# Patient Record
Sex: MALE | Race: WHITE | NOT HISPANIC OR LATINO | Employment: FULL TIME | URBAN - METROPOLITAN AREA
[De-identification: names, ages, dates, MRNs, and addresses within clinical notes are randomized per-mention and may not be internally consistent; named-entity substitution may affect disease eponyms.]

---

## 2017-01-12 ENCOUNTER — APPOINTMENT (OUTPATIENT)
Dept: GENERAL RADIOLOGY | Facility: CLINIC | Age: 37
End: 2017-01-12
Attending: FAMILY MEDICINE
Payer: COMMERCIAL

## 2017-01-12 ENCOUNTER — HOSPITAL ENCOUNTER (EMERGENCY)
Facility: CLINIC | Age: 37
Discharge: HOME | End: 2017-01-12
Attending: FAMILY MEDICINE | Admitting: FAMILY MEDICINE
Payer: COMMERCIAL

## 2017-01-12 VITALS — SYSTOLIC BLOOD PRESSURE: 133 MMHG | DIASTOLIC BLOOD PRESSURE: 83 MMHG | TEMPERATURE: 97.3 F | OXYGEN SATURATION: 97 %

## 2017-01-12 DIAGNOSIS — J45.901 ASTHMA EXACERBATION: ICD-10-CM

## 2017-01-12 DIAGNOSIS — J20.9 ACUTE BRONCHITIS, UNSPECIFIED ORGANISM: ICD-10-CM

## 2017-01-12 PROCEDURE — 93005 ELECTROCARDIOGRAM TRACING: CPT

## 2017-01-12 PROCEDURE — 40000275 ZZH STATISTIC RCP TIME EA 10 MIN

## 2017-01-12 PROCEDURE — 93010 ELECTROCARDIOGRAM REPORT: CPT | Performed by: FAMILY MEDICINE

## 2017-01-12 PROCEDURE — 99284 EMERGENCY DEPT VISIT MOD MDM: CPT | Mod: 25 | Performed by: FAMILY MEDICINE

## 2017-01-12 PROCEDURE — 71020 XR CHEST 2 VW: CPT

## 2017-01-12 PROCEDURE — 99284 EMERGENCY DEPT VISIT MOD MDM: CPT | Mod: 25

## 2017-01-12 PROCEDURE — 25000125 ZZHC RX 250: Performed by: FAMILY MEDICINE

## 2017-01-12 PROCEDURE — 94640 AIRWAY INHALATION TREATMENT: CPT

## 2017-01-12 RX ORDER — ALBUTEROL SULFATE 90 UG/1
2 AEROSOL, METERED RESPIRATORY (INHALATION) EVERY 6 HOURS PRN
Qty: 1 INHALER | Refills: 1 | Status: SHIPPED | OUTPATIENT
Start: 2017-01-12 | End: 2017-09-20

## 2017-01-12 RX ORDER — PREDNISONE 20 MG/1
20 TABLET ORAL 2 TIMES DAILY
Qty: 10 TABLET | Refills: 0 | Status: SHIPPED | OUTPATIENT
Start: 2017-01-12 | End: 2017-01-17

## 2017-01-12 RX ORDER — IPRATROPIUM BROMIDE AND ALBUTEROL SULFATE 2.5; .5 MG/3ML; MG/3ML
3 SOLUTION RESPIRATORY (INHALATION) ONCE
Status: COMPLETED | OUTPATIENT
Start: 2017-01-12 | End: 2017-01-12

## 2017-01-12 RX ADMIN — IPRATROPIUM BROMIDE AND ALBUTEROL SULFATE 3 ML: .5; 3 SOLUTION RESPIRATORY (INHALATION) at 17:09

## 2017-01-12 NOTE — ED NOTES
Patient is here today because of shortness of breath and some left sided chest pain.  Patient said threw his wife that this happened at about 11 am today at work.  Patient said that he has been coughing for 1 week.      Patient did refuse to have a  and wanted his wife to interpret for him.

## 2017-01-12 NOTE — ED PROVIDER NOTES
HPI  Patient is a 36-year-old male presenting with shortness of breath and dizziness.  He has a known history of asthma.  He ran out of his albuterol in October, 2016.  Patient has had prednisone as recently as April.  He was on azithromycin at that time as well.    The patient presents with coughing over the past 10 days.  No fever or production with his cough.  Follow work today, he was coughing in fits and began to have shortness of breath.  He became dizzy shortly after this began.  There was no fainting.  No vertigo described.  No nausea or vomiting.  He does report some wheezing.  He does have some left lateral chest pain with deep breathing and coughing, but only since this morning.    ROS: All other review of systems are negative other than that noted above.   PMH: Reviewed.  SH: Reviewed.  FH: Reviewed.      PHYSICAL  /83 mmHg  Temp(Src) 97.3  F (36.3  C) (Oral)  SpO2 97%  General: Patient is alert and in moderate distress.  Concerned, coughing.  Neurological: Alert.  Moving upper and lower extremities equally, bilaterally.  Head / Neck: Atraumatic.  Ears: Not done.  Eyes: Pupils are equal, round, and reactive.  Normal conjunctiva.  Nose: Midline.  No epistaxis.  Mouth / Throat: No ulcerations or lesions.  Upper pharynx is not erythematous.  Moist.  Respiratory: No respiratory distress. CTA B.  Cardiovascular: Regular rhythm.  Peripheral extremities are warm.  No edema.  No calf tenderness.  Abdomen / Pelvis: Not tender.  No distention.  Soft throughout.  Genitalia: Not done.  Musculoskeletal: No tenderness over major muscles and joints.  Skin: No evidence of rash or trauma.        PHYSICIAN  1645.  Pt presents with sob and dizziness.  Coughing over the past 10 days.  No fever or production.  At work today and began to feel sob.  He became dizzy shortly thereafter.  He continues to feel sob.  Hx of asthma, but no albuterol available since October.  L lateral chest pain with deep breathing and  coughing, since this morning.    EKG  (1612)   Rate: 58     Rhythm: sinus     Axis: nl  Intervals: CA (12-2) 172, QRS (<12) 97, QTc (>5) 409  P wave: nl     QRS complex: nl  ST segment / T-wave: nl  Conclusion: nl, mild sinus bradycardia.    IMAGING  CXR.  Images reviewed by me.  Radiology report was also reviewed.      2227.  Patient has a negative chest x-ray.  The albuterol/Atrovent neb helped with his breathing and cough.  Albuterol prescription will be given.  Prednisone prescription will be given.  Follow-up discussed      IMPRESSION    ICD-10-CM    1. Acute bronchitis, unspecified organism J20.9    2. Asthma exacerbation J45.901            Critical Care Time:  none   Trauma:      CMS Coding:  None        Zay Collins MD  01/12/17 2225

## 2017-01-12 NOTE — DISCHARGE INSTRUCTIONS
Return to the Emergency Room if the following occurs:     Worsened pain, fever, worsened breathing, or for any concern at anytime.    Or, follow-up with the following provider as we discussed:     Return to your primary doctor as needed, or if not improved over the next 7 days.    Medications discussed:    Albuterol.  Prednisone.    If you received pain-relieving or sedating medication during your time in the ER, avoid alcohol, driving automobiles, or working with machinery.  Also, a responsible adult must stay with you.      If you had X-rays or labs done we will attempt to contact you if there is a change needed in your care.      Call the Nurse Advice Line at [phone] or [phone] for any concern at anytime.

## 2017-01-12 NOTE — ED AVS SNAPSHOT
[facility] Emergency Department    [address] [address]    Phone:  [phone]    Fax:  [phone]                                       Santo Toure   MRN: [id number]    Department:  [facility] Emergency Department   Date of Visit:  1/12/2017           Patient Information     Date Of Birth          1980        Your diagnoses for this visit were:     Acute bronchitis, unspecified organism     Asthma exacerbation        You were seen by Zay Collins MD.        Discharge Instructions       Return to the Emergency Room if the following occurs:     Worsened pain, fever, worsened breathing, or for any concern at anytime.    Or, follow-up with the following provider as we discussed:     Return to your primary doctor as needed, or if not improved over the next 7 days.    Medications discussed:    Albuterol.  Prednisone.    If you received pain-relieving or sedating medication during your time in the ER, avoid alcohol, driving automobiles, or working with machinery.  Also, a responsible adult must stay with you.      If you had X-rays or labs done we will attempt to contact you if there is a change needed in your care.      Call the Nurse Advice Line at [phone] or [phone] for any concern at anytime.      24 Hour Appointment Hotline       To make an appointment at any [facility] clinic, call [phone] ([phone]). If you don't have a family doctor or clinic, we will help you find one. [facility] clinics are conveniently located to serve the needs of you and your family.             Review of your medicines      START taking        Dose / Directions Last dose taken    albuterol 108 (90 BASE) MCG/ACT Inhaler   Commonly known as:  PROAIR HFA/PROVENTIL HFA/VENTOLIN HFA   Dose:  2 puff   Quantity:  1 Inhaler        Inhale 2 puffs into the lungs every 6 hours as needed for shortness of breath / dyspnea or wheezing   Refills:  1        predniSONE 20 MG tablet    Commonly known as:  DELTASONE   Dose:  20 mg   Quantity:  10 tablet        Take 1 tablet (20 mg) by mouth 2 times daily for 5 days   Refills:  0          Our records show that you are taking the medicines listed below. If these are incorrect, please call your family doctor or clinic.        Dose / Directions Last dose taken    menthol 7 MG Lozg   Commonly known as:  COUGH DROP   Dose:  1 lozenge        Take 1 lozenge by mouth every hour as needed for cough   Refills:  0                Prescriptions were sent or printed at these locations (2 Prescriptions)                   [facility] [address] [address]    Telephone:  [phone]   Fax:  [phone]   Hours:                  E-Prescribed (2 of 2)         albuterol (PROAIR HFA/PROVENTIL HFA/VENTOLIN HFA) 108 (90 BASE) MCG/ACT Inhaler               predniSONE (DELTASONE) 20 MG tablet                Procedures and tests performed during your visit     EKG 12-lead, tracing only    XR Chest 2 Views      Orders Needing Specimen Collection     None      Pending Results     Date and Time Order Name Status Description    1/12/2017 1644 XR Chest 2 Views Preliminary             Pending Culture Results     No orders found from 1/11/2017 to 1/13/2017.       Test Results from your hospital stay           1/12/2017  5:08 PM - Interface, Radiant Ib      Narrative     CHEST TWO VIEWS 1/12/2017 5:05 PM     COMPARISON: Two view chest x-ray 8/16/2016    HISTORY: Shortness of breath    FINDINGS: The cardiac silhouette, pulmonary vasculature, lungs and  pleural spaces are within normal limits.        Impression     IMPRESSION: Clear lungs.                Thank you for choosing [facility]       Thank you for choosing [facility] for your care. Our goal is always to provide you with excellent care. Hearing back from our patients is one way we can continue to improve our services. Please take a few minutes to  "complete the written survey that you may receive in the mail after you visit with us. Thank you!        [facility] Information     [employer] lets you send messages to your doctor, view your test results, renew your prescriptions, schedule appointments and more. To sign up, go to [email or web address] . Click on \"Log in\" on the left side of the screen, which will take you to the Welcome page. Then click on \"Sign up Now\" on the right side of the page.     You will be asked to enter the access code listed below, as well as some personal information. Please follow the directions to create your username and password.     Your access code is: [email or web address]  Expires: 2017  5:50 PM     Your access code will  in 90 days. If you need help or a new code, please call your [facility] clinic or [phone].        Care EveryWhere ID     This is your Care EveryWhere ID. This could be used by other organizations to access your [facility] medical records  [id number]        After Visit Summary       This is your record. Keep this with you and show to your community pharmacist(s) and doctor(s) at your next visit.                  "

## 2017-01-12 NOTE — LETTER
[facility] EMERGENCY DEPARTMENT  [address] [address]  [phone]    2017    Santo Toure  [address] [address]  [phone] (home)     : 1980      To Whom it may concern:    Santo Toure was seen in our Emergency Department today, 2017.  I expect his condition to improve over the next 1-2 days.  He may return to work/school when improved.    Sincerely,        Zay Collins

## 2017-01-12 NOTE — ED NOTES
pt has had cough for wk and a half and went to work today for 40 min and had to leave due to dizziness

## 2017-01-12 NOTE — ED AVS SNAPSHOT
[facility] Emergency Department    [address] [address]    Phone:  [phone]    Fax:  [phone]                                       Santo Toure   MRN: [id number]    Department:  [facility] Emergency Department   Date of Visit:  1/12/2017           After Visit Summary Signature Page     I have received my discharge instructions, and my questions have been answered. I have discussed any challenges I see with this plan with the nurse or doctor.    ..........................................................................................................................................  Patient/Patient Representative Signature      ..........................................................................................................................................  Patient Representative Print Name and Relationship to Patient    ..................................................               ................................................  Date                                            Time    ..........................................................................................................................................  Reviewed by Signature/Title    ...................................................              ..............................................  Date                                                            Time

## 2017-02-10 DIAGNOSIS — Z53.9 ERRONEOUS ENCOUNTER--DISREGARD: ICD-10-CM

## 2017-02-10 DIAGNOSIS — Z31.41 ENCOUNTER FOR SEMEN ANALYSIS: ICD-10-CM

## 2017-02-28 ENCOUNTER — OFFICE VISIT (OUTPATIENT)
Dept: FAMILY MEDICINE | Facility: CLINIC | Age: 37
End: 2017-02-28
Payer: COMMERCIAL

## 2017-02-28 VITALS
DIASTOLIC BLOOD PRESSURE: 81 MMHG | TEMPERATURE: 98.6 F | OXYGEN SATURATION: 95 % | SYSTOLIC BLOOD PRESSURE: 138 MMHG | HEART RATE: 67 BPM | RESPIRATION RATE: 22 BRPM | WEIGHT: 231 LBS

## 2017-02-28 DIAGNOSIS — J20.9 ACUTE BRONCHITIS WITH SYMPTOMS > 10 DAYS: Primary | ICD-10-CM

## 2017-02-28 PROCEDURE — 99213 OFFICE O/P EST LOW 20 MIN: CPT | Performed by: NURSE PRACTITIONER

## 2017-02-28 RX ORDER — ALBUTEROL SULFATE 90 UG/1
2 AEROSOL, METERED RESPIRATORY (INHALATION) EVERY 6 HOURS PRN
Qty: 1 INHALER | Refills: 0 | Status: SHIPPED | OUTPATIENT
Start: 2017-02-28 | End: 2017-09-13

## 2017-02-28 RX ORDER — PREDNISONE 20 MG/1
TABLET ORAL
Qty: 10 TABLET | Refills: 0 | Status: SHIPPED | OUTPATIENT
Start: 2017-02-28 | End: 2017-09-13

## 2017-02-28 RX ORDER — AZITHROMYCIN 250 MG/1
TABLET, FILM COATED ORAL
Qty: 6 TABLET | Refills: 0 | Status: SHIPPED | OUTPATIENT
Start: 2017-02-28 | End: 2017-08-03

## 2017-02-28 NOTE — PROGRESS NOTES
SUBJECTIVE:                                                    Santo Toure is a 36 year old male who presents to clinic today for the following health issues:      Acute Illness   Acute illness concerns: cough  Onset: 2 weeks    Fever: no    Chills/Sweats: no    Headache (location?): YES- when couging    Sinus Pressure:YES    Conjunctivitis:  no    Ear Pain: YES- behind ears    Rhinorrhea: no    Congestion: no    Sore Throat: YES     Cough: YES    Wheeze: YES    Decreased Appetite: YES    Nausea: no    Vomiting: no    Diarrhea:  no    Dysuria/Freq.: no    Fatigue/Achiness: YES    Sick/Strep Exposure: no     Therapies Tried and outcome: tylenol pm, inhaler, neb treatments- not working. Asking about symbacort.      Problem list and histories reviewed & adjusted, as indicated.  Additional history: as documented    There is no problem list on file for this patient.    History reviewed. No pertinent past surgical history.    Social History   Substance Use Topics     Smoking status: Never Smoker     Smokeless tobacco: Not on file     Alcohol use Yes      Comment: on occasion     History reviewed. No pertinent family history.      Current Outpatient Prescriptions   Medication Sig Dispense Refill     predniSONE (DELTASONE) 20 MG tablet Take one tablet twice a day for 5 days. 10 tablet 0     azithromycin (ZITHROMAX Z-JENNIFER) 250 MG tablet Take 2 tablets on day 1 and then take 1 tablet days 2-5 6 tablet 0     albuterol (PROAIR HFA/PROVENTIL HFA/VENTOLIN HFA) 108 (90 BASE) MCG/ACT Inhaler Inhale 2 puffs into the lungs every 6 hours as needed for shortness of breath / dyspnea or wheezing 1 Inhaler 0     menthol (COUGH DROP) 7 MG LOZG Take 1 lozenge by mouth every hour as needed for cough       albuterol (PROAIR HFA/PROVENTIL HFA/VENTOLIN HFA) 108 (90 BASE) MCG/ACT Inhaler Inhale 2 puffs into the lungs every 6 hours as needed for shortness of breath / dyspnea or wheezing 1 Inhaler 1     Allergies   Allergen Reactions      Penicillins Other (See Comments) and Rash     Vomiting as a infant       Reviewed and updated as needed this visit by clinical staff  Tobacco  Med Hx  Surg Hx  Fam Hx  Soc Hx      Reviewed and updated as needed this visit by Provider         ROS:  Constitutional, HEENT, cardiovascular, pulmonary, gi and gu systems are negative, except as otherwise noted.    OBJECTIVE:                                                    BP (!) 167/93  Pulse 67  Temp 98.6  F (37  C) (Tympanic)  Resp 22  Wt 231 lb (104.8 kg)  SpO2 95%  There is no height or weight on file to calculate BMI.   GENERAL: healthy, alert and no distress  EYES: Eyes grossly normal to inspection, PERRL and conjunctivae and sclerae normal  HENT: ear canals and TM's normal, nose and mouth without ulcers or lesions  NECK: no adenopathy, no asymmetry, masses, or scars and thyroid normal to palpation  RESP: lungs clear to auscultation - no rales, rhonchi or wheezes  CV: regular rate and rhythm, normal S1 S2, no S3 or S4, no murmur, click or rub, no peripheral edema and peripheral pulses strong  MS: no gross musculoskeletal defects noted, no edema  SKIN: no suspicious lesions or rashes  NEURO: Normal strength and tone, mentation intact and speech normal  PSYCH: mentation appears normal, affect normal/bright         ASSESSMENT:                                                        ICD-10-CM    1. Acute bronchitis with symptoms > 10 days J20.9 predniSONE (DELTASONE) 20 MG tablet     azithromycin (ZITHROMAX Z-JENNIFER) 250 MG tablet     albuterol (PROAIR HFA/PROVENTIL HFA/VENTOLIN HFA) 108 (90 BASE) MCG/ACT Inhaler           PLAN:                                                      Patient Instructions   Use Medication as directed    Hydrate with fluids, rest, cool humidifier.  May use acetaminophen, ibuprofen prn.    For your Cough   The [employer]wheat Honey Dose: Given   hour Prior to Bedtime  For children age under 1 year -Do not use due to botulism risk     2-5 years -   tsp (2.5 mL)    6-11 years -1 tsp (5 mL)    12-18 years -2 tsp (10 mL)     Guaifenesin     Adult dose -Not to exceed 2.4 g (2400mg) per day    Child age 6-12 years -100 mg every 4 hr, not to exceed 1.2 g (1200mg) per day     Pediatric, 2-6 years -50 mg every 4 hr as needed, not to exceed 600 mg per day    Cough medications is not recommended in children under 2 years.  With use of cough medications have combination medications be aware of products in the cough medication you are using to avoid overdose    Follow up with PCP in 2 weeks for further work-up of your Asthma  Go to Emergency Room if sx worsen or change, Shortness of breath, chest pain, persistent fevers, or painful breathing occur.     Patient voiced understanding of instructions given.          Bronchitis, Antibiotic Treatment (Adult)    Bronchitis is an infection of the air passages (bronchial tubes) in your lungs. It often occurs when you have a cold. This illness is contagious during the first few days and is spread through the air by coughing and sneezing, or by direct contact (touching the sick person and then touching your own eyes, nose, or mouth).  Symptoms of bronchitis include cough with mucus (phlegm) and low-grade fever. Bronchitis usually lasts 7 to 14 days. Mild cases can be treated with simple home remedies. More severe infection is treated with an antibiotic.  Home care  Follow these guidelines when caring for yourself at home:    If your symptoms are severe, rest at home for the first 2 to 3 days. When you go back to your usual activities, don't let yourself get too tired.    Do not smoke. Also avoid being exposed to secondhand smoke.    You may use over-the-counter medicines to control fever or pain, unless another medicine was prescribed. (Note: If you have chronic liver or kidney disease or have ever had a stomach ulcer or gastrointestinal bleeding, talk with your healthcare provider before using these medicines. Also talk to your  provider if you are taking medicine to prevent blood clots.) Aspirin should never be given to anyone younger than 18 years of age who is ill with a viral infection or fever. It may cause severe liver or brain damage.    Your appetite may be poor, so a light diet is fine. Avoid dehydration by drinking 6 to 8 glasses of fluids per day (such as water, soft drinks, sports drinks, juices, tea, or soup). Extra fluids will help loosen secretions in the nose and lungs.    Over-the-counter cough, cold, and sore-throat medicines will not shorten the length of the illness, but they may be helpful to reduce symptoms. (Note: Do not use decongestants if you have high blood pressure.)    Finish all antibiotic medicine. Do this even if you are feeling better after only a few days.  Follow-up care  Follow up with your healthcare provider, or as advised. If you had an X-ray or ECG (electrocardiogram), a specialist will review it. You will be notified of any new findings that may affect your care.  Note: If you are age 65 or older, or if you have a chronic lung disease or condition that affects your immune system, or you smoke, talk to your healthcare provider about having pneumococcal vaccinations and a yearly influenza vaccination (flu shot).  When to seek medical advice  Call your healthcare provider right away if any of these occur:    Fever of 100.4 F (38 C) or higher    Coughing up increased amounts of colored sputum    Weakness, drowsiness, headache, facial pain, ear pain, or a stiff neck   Call 911, or get immediate medical care  Contact emergency services right away if any of these occur.    Coughing up blood    Worsening weakness, drowsiness, headache, or stiff neck    Trouble breathing, wheezing, or pain with breathing    [phone] [employer]. [address] [place] [address], [place] [address]. All rights reserved. This information is not intended as a substitute for professional medical care. Always follow your  healthcare professional's instructions.            ROSSY Wynne CNP  [facility]

## 2017-02-28 NOTE — MR AVS SNAPSHOT
After Visit Summary   2/28/2017    Santo Toure    MRN: [id number]           Patient Information     Date Of Birth          1980        Visit Information        Provider Department      2/28/2017 2:20 PM Ina May, APRN CNP; ASL [facility]        Today's Diagnoses     Acute bronchitis with symptoms > 10 days    -  1      Care Instructions    Use Medication as directed    Hydrate with fluids, rest, cool humidifier.  May use acetaminophen, ibuprofen prn.    For your Cough   The Buckwheat Honey Dose: Given   hour Prior to Bedtime  For children age under 1 year -Do not use due to botulism risk     2-5 years -  tsp (2.5 mL)    6-11 years -1 tsp (5 mL)    12-18 years -2 tsp (10 mL)     Guaifenesin     Adult dose -Not to exceed 2.4 g (2400mg) per day    Child age 6-12 years -100 mg every 4 hr, not to exceed 1.2 g (1200mg) per day     Pediatric, 2-6 years -50 mg every 4 hr as needed, not to exceed 600 mg per day    Cough medications is not recommended in children under 2 years.  With use of cough medications have combination medications be aware of products in the cough medication you are using to avoid overdose    Follow up with PCP in 2 weeks for further work-up of your Asthma  Go to Emergency Room if sx worsen or change, Shortness of breath, chest pain, persistent fevers, or painful breathing occur.     Patient voiced understanding of instructions given.          Bronchitis, Antibiotic Treatment (Adult)    Bronchitis is an infection of the air passages (bronchial tubes) in your lungs. It often occurs when you have a cold. This illness is contagious during the first few days and is spread through the air by coughing and sneezing, or by direct contact (touching the sick person and then touching your own eyes, nose, or mouth).  Symptoms of bronchitis include cough with mucus (phlegm) and low-grade fever. Bronchitis usually lasts 7 to 14 days. Mild cases can be treated  with simple home remedies. More severe infection is treated with an antibiotic.  Home care  Follow these guidelines when caring for yourself at home:    If your symptoms are severe, rest at home for the first 2 to 3 days. When you go back to your usual activities, don't let yourself get too tired.    Do not smoke. Also avoid being exposed to secondhand smoke.    You may use over-the-counter medicines to control fever or pain, unless another medicine was prescribed. (Note: If you have chronic liver or kidney disease or have ever had a stomach ulcer or gastrointestinal bleeding, talk with your healthcare provider before using these medicines. Also talk to your provider if you are taking medicine to prevent blood clots.) Aspirin should never be given to anyone younger than 18 years of age who is ill with a viral infection or fever. It may cause severe liver or brain damage.    Your appetite may be poor, so a light diet is fine. Avoid dehydration by drinking 6 to 8 glasses of fluids per day (such as water, soft drinks, sports drinks, juices, tea, or soup). Extra fluids will help loosen secretions in the nose and lungs.    Over-the-counter cough, cold, and sore-throat medicines will not shorten the length of the illness, but they may be helpful to reduce symptoms. (Note: Do not use decongestants if you have high blood pressure.)    Finish all antibiotic medicine. Do this even if you are feeling better after only a few days.  Follow-up care  Follow up with your healthcare provider, or as advised. If you had an X-ray or ECG (electrocardiogram), a specialist will review it. You will be notified of any new findings that may affect your care.  Note: If you are age 65 or older, or if you have a chronic lung disease or condition that affects your immune system, or you smoke, talk to your healthcare provider about having pneumococcal vaccinations and a yearly influenza vaccination (flu shot).  When to seek medical advice  Call  "your healthcare provider right away if any of these occur:    Fever of 100.4 F (38 C) or higher    Coughing up increased amounts of colored sputum    Weakness, drowsiness, headache, facial pain, ear pain, or a stiff neck   Call 911, or get immediate medical care  Contact emergency services right away if any of these occur.    Coughing up blood    Worsening weakness, drowsiness, headache, or stiff neck    Trouble breathing, wheezing, or pain with breathing    7499-8468 [employer]. [address] [place] [address]. All rights reserved. This information is not intended as a substitute for professional medical care. Always follow your healthcare professional's instructions.              Follow-ups after your visit        Who to contact     If you have questions or need follow up information about today's clinic visit or your schedule please contact [facility] directly at [phone].  Normal or non-critical lab and imaging results will be communicated to you by Insightpoolhart, letter or phone within 4 business days after the clinic has received the results. If you do not hear from us within 7 days, please contact the clinic through Insightpoolhart or phone. If you have a critical or abnormal lab result, we will notify you by phone as soon as possible.  Submit refill requests through L[employer]ENE or call your pharmacy and they will forward the refill request to us. Please allow 3 business days for your refill to be completed.          Additional Information About Your Visit        Insightpoolhart Information     LYSOGENE lets you send messages to your doctor, view your test results, renew your prescriptions, schedule appointments and more. To sign up, go to [email or web address] . Click on \"Log in\" on the left side of the screen, which will take you to the Welcome page. Then click on \"Sign up Now\" on the right side of the page.     You will be asked to enter the access code listed below, as well as some " personal information. Please follow the directions to create your username and password.     Your access code is: [email or web address]  Expires: 2017  5:50 PM     Your access code will  in 90 days. If you need help or a new code, please call your [facility] clinic or [phone].        Care EveryWhere ID     This is your Care EveryWhere ID. This could be used by other organizations to access your [facility] medical records  [id number]        Your Vitals Were     Pulse Temperature Respirations Pulse Oximetry          67 98.6  F (37  C) (Tympanic) 22 95%         Blood Pressure from Last 3 Encounters:   17 (!) 167/93   17 133/83   16 128/83    Weight from Last 3 Encounters:   17 231 lb (104.8 kg)   16 235 lb (106.6 kg)   16 242 lb 9.6 oz (110 kg)              Today, you had the following     No orders found for display         Today's Medication Changes          These changes are accurate as of: 17  2:55 PM.  If you have any questions, ask your nurse or doctor.               Start taking these medicines.        Dose/Directions    azithromycin 250 MG tablet   Commonly known as:  ZITHROMAX Z-JENNIFER   Used for:  Acute bronchitis with symptoms > 10 days   Started by:  Ina May APRN CNP        Take 2 tablets on day 1 and then take 1 tablet days 2-5   Quantity:  6 tablet   Refills:  0       predniSONE 20 MG tablet   Commonly known as:  DELTASONE   Used for:  Acute bronchitis with symptoms > 10 days   Started by:  Ina May APRN CNP        Take one tablet twice a day for 5 days.   Quantity:  10 tablet   Refills:  0            Where to get your medicines      These medications were sent to [facility] [address] [place] [id number]     Phone:  [phone]     azithromycin 250 MG tablet    predniSONE 20 MG tablet                Primary Care Provider    None Specified       No primary provider on file.         Thank you!     Thank you for choosing [facility]  for your care. Our goal is always to provide you with excellent care. Hearing back from our patients is one way we can continue to improve our services. Please take a few minutes to complete the written survey that you may receive in the mail after your visit with us. Thank you!             Your Updated Medication List - Protect others around you: Learn how to safely use, store and throw away your medicines at www.disposemymeds.org.          This list is accurate as of: 2/28/17  2:55 PM.  Always use your most recent med list.                   Brand Name Dispense Instructions for use    albuterol 108 (90 BASE) MCG/ACT Inhaler    PROAIR HFA/PROVENTIL HFA/VENTOLIN HFA    1 Inhaler    Inhale 2 puffs into the lungs every 6 hours as needed for shortness of breath / dyspnea or wheezing       azithromycin 250 MG tablet    ZITHROMAX Z-JENNIFER    6 tablet    Take 2 tablets on day 1 and then take 1 tablet days 2-5       menthol 7 MG Lozg    COUGH DROP     Take 1 lozenge by mouth every hour as needed for cough       predniSONE 20 MG tablet    DELTASONE    10 tablet    Take one tablet twice a day for 5 days.

## 2017-02-28 NOTE — PATIENT INSTRUCTIONS
Use Medication as directed    Hydrate with fluids, rest, cool humidifier.  May use acetaminophen, ibuprofen prn.    For your Cough   The Buckwheat Honey Dose: Given   hour Prior to Bedtime  For children age under 1 year -Do not use due to botulism risk     2-5 years -  tsp (2.5 mL)    6-11 years -1 tsp (5 mL)    12-18 years -2 tsp (10 mL)     Guaifenesin     Adult dose -Not to exceed 2.4 g (2400mg) per day    Child age 6-12 years -100 mg every 4 hr, not to exceed 1.2 g (1200mg) per day     Pediatric, 2-6 years -50 mg every 4 hr as needed, not to exceed 600 mg per day    Cough medications is not recommended in children under 2 years.  With use of cough medications have combination medications be aware of products in the cough medication you are using to avoid overdose    Follow up with PCP in 2 weeks for further work-up of your Asthma  Go to Emergency Room if sx worsen or change, Shortness of breath, chest pain, persistent fevers, or painful breathing occur.     Patient voiced understanding of instructions given.          Bronchitis, Antibiotic Treatment (Adult)    Bronchitis is an infection of the air passages (bronchial tubes) in your lungs. It often occurs when you have a cold. This illness is contagious during the first few days and is spread through the air by coughing and sneezing, or by direct contact (touching the sick person and then touching your own eyes, nose, or mouth).  Symptoms of bronchitis include cough with mucus (phlegm) and low-grade fever. Bronchitis usually lasts 7 to 14 days. Mild cases can be treated with simple home remedies. More severe infection is treated with an antibiotic.  Home care  Follow these guidelines when caring for yourself at home:    If your symptoms are severe, rest at home for the first 2 to 3 days. When you go back to your usual activities, don't let yourself get too tired.    Do not smoke. Also avoid being exposed to secondhand smoke.    You may use over-the-counter  medicines to control fever or pain, unless another medicine was prescribed. (Note: If you have chronic liver or kidney disease or have ever had a stomach ulcer or gastrointestinal bleeding, talk with your healthcare provider before using these medicines. Also talk to your provider if you are taking medicine to prevent blood clots.) Aspirin should never be given to anyone younger than 18 years of age who is ill with a viral infection or fever. It may cause severe liver or brain damage.    Your appetite may be poor, so a light diet is fine. Avoid dehydration by drinking 6 to 8 glasses of fluids per day (such as water, soft drinks, sports drinks, juices, tea, or soup). Extra fluids will help loosen secretions in the nose and lungs.    Over-the-counter cough, cold, and sore-throat medicines will not shorten the length of the illness, but they may be helpful to reduce symptoms. (Note: Do not use decongestants if you have high blood pressure.)    Finish all antibiotic medicine. Do this even if you are feeling better after only a few days.  Follow-up care  Follow up with your healthcare provider, or as advised. If you had an X-ray or ECG (electrocardiogram), a specialist will review it. You will be notified of any new findings that may affect your care.  Note: If you are age 65 or older, or if you have a chronic lung disease or condition that affects your immune system, or you smoke, talk to your healthcare provider about having pneumococcal vaccinations and a yearly influenza vaccination (flu shot).  When to seek medical advice  Call your healthcare provider right away if any of these occur:    Fever of 100.4 F (38 C) or higher    Coughing up increased amounts of colored sputum    Weakness, drowsiness, headache, facial pain, ear pain, or a stiff neck   Call 911, or get immediate medical care  Contact emergency services right away if any of these occur.    Coughing up blood    Worsening weakness, drowsiness, headache, or  stiff neck    Trouble breathing, wheezing, or pain with breathing    3808-5003 [employer]. [address] [place] [address], [place] [address]. All rights reserved. This information is not intended as a substitute for professional medical care. Always follow your healthcare professional's instructions.

## 2017-07-28 VITALS
WEIGHT: 230 LBS | SYSTOLIC BLOOD PRESSURE: 153 MMHG | TEMPERATURE: 97.5 F | OXYGEN SATURATION: 98 % | DIASTOLIC BLOOD PRESSURE: 102 MMHG | RESPIRATION RATE: 18 BRPM

## 2017-07-28 PROCEDURE — 99283 EMERGENCY DEPT VISIT LOW MDM: CPT | Performed by: STUDENT IN AN ORGANIZED HEALTH CARE EDUCATION/TRAINING PROGRAM

## 2017-07-28 PROCEDURE — 99284 EMERGENCY DEPT VISIT MOD MDM: CPT | Mod: Z6 | Performed by: STUDENT IN AN ORGANIZED HEALTH CARE EDUCATION/TRAINING PROGRAM

## 2017-07-29 ENCOUNTER — HOSPITAL ENCOUNTER (EMERGENCY)
Facility: CLINIC | Age: 37
Discharge: HOME | End: 2017-07-29
Attending: STUDENT IN AN ORGANIZED HEALTH CARE EDUCATION/TRAINING PROGRAM | Admitting: STUDENT IN AN ORGANIZED HEALTH CARE EDUCATION/TRAINING PROGRAM
Payer: OTHER MISCELLANEOUS

## 2017-07-29 ENCOUNTER — APPOINTMENT (OUTPATIENT)
Dept: GENERAL RADIOLOGY | Facility: CLINIC | Age: 37
End: 2017-07-29
Attending: STUDENT IN AN ORGANIZED HEALTH CARE EDUCATION/TRAINING PROGRAM
Payer: OTHER MISCELLANEOUS
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
## 2017-07-29 DIAGNOSIS — S60.222A CONTUSION OF LEFT HAND, INITIAL ENCOUNTER: ICD-10-CM

## 2017-07-29 DIAGNOSIS — S60.112A SUBUNGUAL HEMATOMA OF LEFT THUMB, INITIAL ENCOUNTER: ICD-10-CM

## 2017-07-29 PROCEDURE — 25000132 ZZH RX MED GY IP 250 OP 250 PS 637: Performed by: STUDENT IN AN ORGANIZED HEALTH CARE EDUCATION/TRAINING PROGRAM

## 2017-07-29 PROCEDURE — 73130 X-RAY EXAM OF HAND: CPT | Mod: LT

## 2017-07-29 RX ORDER — OXYCODONE AND ACETAMINOPHEN 5; 325 MG/1; MG/1
1 TABLET ORAL ONCE
Status: COMPLETED | OUTPATIENT
Start: 2017-07-29 | End: 2017-07-29

## 2017-07-29 RX ORDER — OXYCODONE AND ACETAMINOPHEN 5; 325 MG/1; MG/1
1-2 TABLET ORAL EVERY 4 HOURS PRN
Qty: 10 TABLET | Refills: 0 | Status: SHIPPED | OUTPATIENT
Start: 2017-07-29 | End: 2017-09-13

## 2017-07-29 RX ADMIN — OXYCODONE HYDROCHLORIDE AND ACETAMINOPHEN 1 TABLET: 5; 325 TABLET ORAL at 02:57

## 2017-07-29 RX ADMIN — IBUPROFEN 600 MG: 400 TABLET ORAL at 02:57

## 2017-07-29 NOTE — ED AVS SNAPSHOT
[facility] Emergency Department    [address] [facility] [address]    Phone:  [phone]    Fax:  [phone]                                       Santo Toure   MRN: [id number]    Department:  [facility] Emergency Department   Date of Visit:  7/28/2017           After Visit Summary Signature Page     I have received my discharge instructions, and my questions have been answered. I have discussed any challenges I see with this plan with the nurse or doctor.    ..........................................................................................................................................  Patient/Patient Representative Signature      ..........................................................................................................................................  Patient Representative Print Name and Relationship to Patient    ..................................................               ................................................  Date                                            Time    ..........................................................................................................................................  Reviewed by Signature/Title    ...................................................              ..............................................  Date                                                            Time

## 2017-07-29 NOTE — ED AVS SNAPSHOT
[facility] Emergency Department    [address] [facility] [address]    Phone:  [phone]    Fax:  [phone]                                       Santo Toure   MRN: [id number]    Department:  [facility] Emergency Department   Date of Visit:  7/28/2017           Patient Information     Date Of Birth          1980        Your diagnoses for this visit were:     Contusion of left hand, initial encounter     Subungual hematoma of left thumb, initial encounter        You were seen by Asif Hicks DO.      Follow-up Information     Follow up with [facility].    Specialty:  EMERGENCY MEDICINE    Why:  Return if symptoms change/progress.    Contact information:    [address] [place] [address] [phone]    Additional information:    The medical center is located at   [address] [place] [address]. (between [facility] and   [facility] in [place], four miles north   of [place]).        Discharge Instructions         Hand Contusion  You have a contusion. This is also called a bruise. There is swelling and some bleeding under the skin, but no broken bones. This injury generally takes a few days to a few weeks to heal.  During that time, the bruise will typically change in color from reddish, to purple-blue, to greenish-yellow, then to yellow-brown.  Home care    Elevate the hand to reduce pain and swelling. As much as possible, sit or lie down with the hand raised about the level of your heart. This is especially important during the first 48 hours.    Ice the hand to help reduce pain and swelling. Wrap a cold source (ice pack or ice cubes in a plastic bag) in a thin towel. Apply to the bruised area for 20 minutes every 1 to 2 hours the first day. Continue this 3 to 4 times a day until the pain and swelling goes away.    Unless another medicine was prescribed, you can take acetaminophen, ibuprofen, or naproxen to control pain. (If you have chronic liver  or kidney disease or ever had a stomach ulcer or gastrointestinal bleeding, talk with your doctor before using these medicines.)  Follow up  Follow up with your healthcare provider or our staff as advised. Call if you are not improving within 1 to 2 weeks.  When to seek medical advice   Call your healthcare provider right away if you have any of the following:    Increased pain or swelling    Arm becomes cold, blue, numb or tingly    Signs of infection: Warmth, drainage, or increased redness or pain around the bruise    Inability to move the injured hand     Frequent bruising for unknown reasons  Date Last Reviewed: 2/1/2017 2000-2017 [facility]. [address] [place], [place] [id number]. All rights reserved. This information is not intended as a substitute for professional medical care. Always follow your healthcare professional's instructions.          Discharge References/Attachments     CONCUSSION, NO WAKE-UP (ENGLISH)      24 Hour Appointment Hotline       To make an appointment at any [facility], call [phone] ([phone]). If you don't have a family doctor or clinic, we will help you find one. [facility] clinics are conveniently located to serve the needs of you and your family.             Review of your medicines      START taking        Dose / Directions Last dose taken    oxyCODONE-acetaminophen 5-325 MG per tablet   Commonly known as:  PERCOCET   Dose:  1-2 tablet   Quantity:  10 tablet        Take 1-2 tablets by mouth every 4 hours as needed for pain maximum 8 tablet(s) per day   Refills:  0          Our records show that you are taking the medicines listed below. If these are incorrect, please call your family doctor or clinic.        Dose / Directions Last dose taken    * albuterol 108 (90 BASE) MCG/ACT Inhaler   Commonly known as:  PROAIR HFA/PROVENTIL HFA/VENTOLIN HFA   Dose:  2 puff   Quantity:  1 Inhaler        Inhale 2 puffs into the lungs every 6 hours as needed  for shortness of breath / dyspnea or wheezing   Refills:  1        * albuterol 108 (90 BASE) MCG/ACT Inhaler   Commonly known as:  PROAIR HFA/PROVENTIL HFA/VENTOLIN HFA   Dose:  2 puff   Quantity:  1 Inhaler        Inhale 2 puffs into the lungs every 6 hours as needed for shortness of breath / dyspnea or wheezing   Refills:  0        azithromycin 250 MG tablet   Commonly known as:  ZITHROMAX Z-JENNIFER   Quantity:  6 tablet        Take 2 tablets on day 1 and then take 1 tablet days 2-5   Refills:  0        menthol 7 MG Lozg   Commonly known as:  COUGH DROP   Dose:  1 lozenge        Take 1 lozenge by mouth every hour as needed for cough   Refills:  0        predniSONE 20 MG tablet   Commonly known as:  DELTASONE   Quantity:  10 tablet        Take one tablet twice a day for 5 days.   Refills:  0        * Notice:  This list has 2 medication(s) that are the same as other medications prescribed for you. Read the directions carefully, and ask your doctor or other care provider to review them with you.            Prescriptions were sent or printed at these locations (1 Prescription)                   Other Prescriptions                Printed at Department/Unit printer (1 of 1)         oxyCODONE-acetaminophen (PERCOCET) 5-325 MG per tablet                Procedures and tests performed during your visit     Hand XR, G/E 3 views, left      Orders Needing Specimen Collection     None      Pending Results     No orders found from 7/27/2017 to 7/30/2017.            Pending Culture Results     No orders found from 7/27/2017 to 7/30/2017.            Pending Results Instructions     If you had any lab results that were not finalized at the time of your Discharge, you can call the ED Lab Result RN at [phone]. You will be contacted by this team for any positive Lab results or changes in treatment. The nurses are available 7 days a week from 10A to 6:30P.  You can leave a message 24 hours per day and they will return your call.         Test Results From Your Hospital Stay        7/29/2017  2:35 AM      Narrative     LEFT HAND 3 VIEWS  7/29/2017 2:21 AM     HISTORY: Pain after injury.    COMPARISON: None.        Impression     IMPRESSION: No visualized acute fracture or malalignment of the left  hand.    LISY HENDRIX MD                Thank you for choosing [facility]       Thank you for choosing [facility] for your care. Our goal is always to provide you with excellent care. Hearing back from our patients is one way we can continue to improve our services. Please take a few minutes to complete the written survey that you may receive in the mail after you visit with us. Thank you!        [employer] Information     [facility] gives you secure access to your electronic health record. If you see a primary care provider, you can also send messages to your care team and make appointments. If you have questions, please call your primary care clinic.  If you do not have a primary care provider, please call [phone] and they will assist you.        Care EveryWhere ID     This is your Care EveryWhere ID. This could be used by other organizations to access your [facility] medical records  [id number]        Equal Access to Services     ANTONINA ALBA : Hadii teo Victoria, waaxda luqadaha, qaybta kaalmajasper reza, mookie briggs . So [facility] [phone].    ATENCIÓN: Si habla español, tiene a marshall disposición servicios gratuitos de asistencia lingüística. Llame al [phone].    We comply with applicable federal civil rights laws and [place] laws. We do not discriminate on the basis of race, color, national origin, age, disability sex, sexual orientation or gender identity.            After Visit Summary       This is your record. Keep this with you and show to your community pharmacist(s) and doctor(s) at your next visit.

## 2017-07-29 NOTE — ED PROVIDER NOTES
History     Chief Complaint   Patient presents with     Hand Pain     Deaf pt that presents to ER with wife who is .  pt declined  services and wife will interpret for staff.  pt was moving drum at 1930 with crane and drum hit table that thumb was holding and entire left hand hurts.      HPI  Santo Toure is a 37 year old male with medical history including deafness who presents to the department for complaint of left hand pain after injury sustained at work. He shouldn't is refused interpretive services and prefers that his wife interprets for him. He explains that around 7 PM while at work his hand was compressed between a 2000 pound drum and table. He did not feel pain immediately but beginning around 9 PM he began suffering from increasing hand and finger discomfort. The pain is constant and most pronounced along the distal left thumb near subungual hematoma. He denies sensory deficit, weakness, or cyanosis. No other injuries.    I have reviewed the Medications, Allergies, Past Medical and Surgical History, and Social History in the Epic system.    There is no problem list on file for this patient.      No past surgical history on file.    Social History     Social History     Marital status: Single     Spouse name: N/A     Number of children: N/A     Years of education: N/A     Occupational History     Not on file.     Social History Main Topics     Smoking status: Never Smoker     Smokeless tobacco: Not on file     Alcohol use Yes      Comment: on occasion     Drug use: Not on file     Sexual activity: Not on file     Other Topics Concern     Not on file     Social History Narrative       No family history on file.      There is no immunization history on file for this patient.      Review of Systems  Constitutional: Negative for fever or chills.  Musculoskeletal: Positive for left hand pain and left thumb pain.  Neurological: Negative for sensory deficits or weakness.  Skin:  Positive for left thumb subungual hematoma. Negative for laceration.    All others reviewed and are negative.      Physical Exam   BP: (!) 153/102  Heart Rate: 67  Temp: 97.5  F (36.4  C)  Resp: 18  Weight: 104.3 kg (230 lb)  SpO2: 98 %  Physical Exam  Constitutional: Well developed, well nourished. Appears nontoxic and in no acute distress.   Head: Normocephalic and atraumatic. Symmetric in appearance.  Eyes: Conjunctivae are normal.  Neck: Neck supple.  Cardiovascular: No cyanosis.   Respiratory: Effort normal, no respiratory distress.   Musculoskeletal: Left thumb subungual hematoma. No obvious deformity or wound/laceration. Left distal thumb and thenar tenderness to palpation, no other finger or hand tenderness and no snuffbox tenderness. Patient is able to abduct fingers against resistance, oppose thumb to index finger, and extend as expected. Sensation intact of all digits along median, radial, and ulnar nerve distributions. FDP, FDS, and Extensor tendons intact. No cyanosis and capillary refill less than 2 seconds in each digit. 2/4 palpable radial and ulnar pulses.  Neuro: Patient is alert.  Skin: Skin is warm and dry, not diaphoretic.      ED Course     ED Course     Procedures             Critical Care time:  none               Labs Ordered and Resulted from Time of ED Arrival Up to the Time of Departure from the ED - No data to display    Assessments & Plan (with Medical Decision Making)   Santo Toure is a 37 year old male who presents to the emergency department for complaint of left hand pain after compression injury sustained while at work. Based on his symptoms and the clinical examination, there is no evidence to suggest deformity, skin or nail injury, tendon rupture, or neurovascular deficits. Radiographic imaging is unable to identify a fracture or other acute bony abnormality. Subungual hematoma was drained via cautery and patient tolerated well.    Clinical impression is that his symptoms are  likely caused by soft tissue injury from compression, we discussed the unlikely possibility of compartment syndrome of hand and symptoms to watch for. He may use over-the-counter ibuprofen as directed or short course of Percocet as needed for breakthrough pain. They were also instructed to return to an emergency department if they develop increasing pain or swelling, neurologic deficits, or other concerning symptoms.      Disclaimer: This note consists of symbols derived from keyboarding, dictation, and/or voice recognition software. As a result, there may be errors in the script that have gone undetected.  Please consider this when interpreting information found in the chart.         I have reviewed the nursing notes.    I have reviewed the findings, diagnosis, plan and need for follow up with the patient.       Discharge Medication List as of 7/29/2017  3:57 AM      START taking these medications    Details   oxyCODONE-acetaminophen (PERCOCET) 5-325 MG per tablet Take 1-2 tablets by mouth every 4 hours as needed for pain maximum 8 tablet(s) per day, Disp-10 tablet, R-0, Local Print             Final diagnoses:   Contusion of left hand, initial encounter   Subungual hematoma of left thumb, initial encounter       7/28/2017   [facility] EMERGENCY DEPARTMENT     Asif Hicks,   07/29/17 0439

## 2017-07-29 NOTE — DISCHARGE INSTRUCTIONS
Hand Contusion  You have a contusion. This is also called a bruise. There is swelling and some bleeding under the skin, but no broken bones. This injury generally takes a few days to a few weeks to heal.  During that time, the bruise will typically change in color from reddish, to purple-blue, to greenish-yellow, then to yellow-brown.  Home care    Elevate the hand to reduce pain and swelling. As much as possible, sit or lie down with the hand raised about the level of your heart. This is especially important during the first 48 hours.    Ice the hand to help reduce pain and swelling. Wrap a cold source (ice pack or ice cubes in a plastic bag) in a thin towel. Apply to the bruised area for 20 minutes every 1 to 2 hours the first day. Continue this 3 to 4 times a day until the pain and swelling goes away.    Unless another medicine was prescribed, you can take acetaminophen, ibuprofen, or naproxen to control pain. (If you have chronic liver or kidney disease or ever had a stomach ulcer or gastrointestinal bleeding, talk with your doctor before using these medicines.)  Follow up  Follow up with your healthcare provider or our staff as advised. Call if you are not improving within 1 to 2 weeks.  When to seek medical advice   Call your healthcare provider right away if you have any of the following:    Increased pain or swelling    Arm becomes cold, blue, numb or tingly    Signs of infection: Warmth, drainage, or increased redness or pain around the bruise    Inability to move the injured hand     Frequent bruising for unknown reasons  Date Last Reviewed: 2/1/2017 2000-2017 [employer]. [address] [place] [address], [place] [address]. All rights reserved. This information is not intended as a substitute for professional medical care. Always follow your healthcare professional's instructions.

## 2017-08-03 ENCOUNTER — ANESTHESIA (OUTPATIENT)
Dept: EMERGENCY MEDICINE | Facility: CLINIC | Age: 37
End: 2017-08-03
Payer: OTHER MISCELLANEOUS

## 2017-08-03 ENCOUNTER — HOSPITAL ENCOUNTER (EMERGENCY)
Facility: CLINIC | Age: 37
Discharge: HOME | End: 2017-08-03
Attending: STUDENT IN AN ORGANIZED HEALTH CARE EDUCATION/TRAINING PROGRAM | Admitting: STUDENT IN AN ORGANIZED HEALTH CARE EDUCATION/TRAINING PROGRAM
Payer: OTHER MISCELLANEOUS

## 2017-08-03 ENCOUNTER — APPOINTMENT (OUTPATIENT)
Dept: CT IMAGING | Facility: CLINIC | Age: 37
End: 2017-08-03
Attending: STUDENT IN AN ORGANIZED HEALTH CARE EDUCATION/TRAINING PROGRAM
Payer: OTHER MISCELLANEOUS

## 2017-08-03 ENCOUNTER — APPOINTMENT (OUTPATIENT)
Dept: GENERAL RADIOLOGY | Facility: CLINIC | Age: 37
End: 2017-08-03
Attending: STUDENT IN AN ORGANIZED HEALTH CARE EDUCATION/TRAINING PROGRAM
Payer: OTHER MISCELLANEOUS

## 2017-08-03 ENCOUNTER — ANESTHESIA EVENT (OUTPATIENT)
Dept: EMERGENCY MEDICINE | Facility: CLINIC | Age: 37
End: 2017-08-03
Payer: OTHER MISCELLANEOUS

## 2017-08-03 VITALS
WEIGHT: 230 LBS | TEMPERATURE: 98 F | DIASTOLIC BLOOD PRESSURE: 74 MMHG | SYSTOLIC BLOOD PRESSURE: 127 MMHG | BODY MASS INDEX: 31.15 KG/M2 | HEIGHT: 72 IN | HEART RATE: 93 BPM | OXYGEN SATURATION: 92 % | RESPIRATION RATE: 16 BRPM

## 2017-08-03 DIAGNOSIS — R31.9 URINARY TRACT INFECTION WITH HEMATURIA, SITE UNSPECIFIED: ICD-10-CM

## 2017-08-03 DIAGNOSIS — R11.2 NON-INTRACTABLE VOMITING WITH NAUSEA, UNSPECIFIED VOMITING TYPE: ICD-10-CM

## 2017-08-03 DIAGNOSIS — N39.0 URINARY TRACT INFECTION WITH HEMATURIA, SITE UNSPECIFIED: ICD-10-CM

## 2017-08-03 DIAGNOSIS — R51.9 HEADACHE, UNSPECIFIED HEADACHE TYPE: ICD-10-CM

## 2017-08-03 DIAGNOSIS — K59.03 DRUG-INDUCED CONSTIPATION: ICD-10-CM

## 2017-08-03 DIAGNOSIS — S60.012A CONTUSION OF LEFT THUMB WITHOUT DAMAGE TO NAIL, INITIAL ENCOUNTER: ICD-10-CM

## 2017-08-03 LAB
ALBUMIN SERPL-MCNC: 3.3 G/DL (ref 3.4–5)
ALBUMIN UR-MCNC: 30 MG/DL
ALP SERPL-CCNC: 101 U/L (ref 40–150)
ALT SERPL W P-5'-P-CCNC: 32 U/L (ref 0–70)
ANION GAP SERPL CALCULATED.3IONS-SCNC: 7 MMOL/L (ref 3–14)
APPEARANCE CSF: CLEAR
APPEARANCE UR: CLEAR
AST SERPL W P-5'-P-CCNC: 28 U/L (ref 0–45)
BASOPHILS # BLD AUTO: 0 10E9/L (ref 0–0.2)
BASOPHILS NFR BLD AUTO: 0 %
BILIRUB SERPL-MCNC: 1 MG/DL (ref 0.2–1.3)
BILIRUB UR QL STRIP: NEGATIVE
BUN SERPL-MCNC: 8 MG/DL (ref 7–30)
CALCIUM SERPL-MCNC: 9.4 MG/DL (ref 8.5–10.1)
CHLORIDE SERPL-SCNC: 103 MMOL/L (ref 94–109)
CO2 SERPL-SCNC: 27 MMOL/L (ref 20–32)
COLOR CSF: COLORLESS
COLOR UR AUTO: YELLOW
CREAT SERPL-MCNC: 0.7 MG/DL (ref 0.66–1.25)
DIFFERENTIAL METHOD BLD: ABNORMAL
EOSINOPHIL # BLD AUTO: 0 10E9/L (ref 0–0.7)
EOSINOPHIL NFR BLD AUTO: 0 %
ERYTHROCYTE [DISTWIDTH] IN BLOOD BY AUTOMATED COUNT: 13 % (ref 10–15)
ETHANOL SERPL-MCNC: <0.01 G/DL
GFR SERPL CREATININE-BSD FRML MDRD: ABNORMAL ML/MIN/1.7M2
GLUCOSE CSF-MCNC: 58 MG/DL (ref 40–70)
GLUCOSE SERPL-MCNC: 98 MG/DL (ref 70–99)
GLUCOSE UR STRIP-MCNC: NEGATIVE MG/DL
GRAM STN SPEC: NORMAL
HCT VFR BLD AUTO: 43.7 % (ref 40–53)
HGB BLD-MCNC: 15 G/DL (ref 13.3–17.7)
HGB UR QL STRIP: ABNORMAL
INR PPP: 0.98 (ref 0.86–1.14)
KETONES UR STRIP-MCNC: 80 MG/DL
LEUKOCYTE ESTERASE UR QL STRIP: ABNORMAL
LIPASE SERPL-CCNC: 78 U/L (ref 73–393)
LYMPHOCYTES # BLD AUTO: 0.6 10E9/L (ref 0.8–5.3)
LYMPHOCYTES NFR BLD AUTO: 10 %
Lab: NORMAL
MCH RBC QN AUTO: 27.7 PG (ref 26.5–33)
MCHC RBC AUTO-ENTMCNC: 34.3 G/DL (ref 31.5–36.5)
MCV RBC AUTO: 81 FL (ref 78–100)
MICRO REPORT STATUS: NORMAL
MONOCYTES # BLD AUTO: 0.2 10E9/L (ref 0–1.3)
MONOCYTES NFR BLD AUTO: 4 %
MUCOUS THREADS #/AREA URNS LPF: PRESENT /LPF
NEUTROPHILS # BLD AUTO: 5.3 10E9/L (ref 1.6–8.3)
NEUTROPHILS NFR BLD AUTO: 86 %
NITRATE UR QL: NEGATIVE
PH UR STRIP: 8 PH (ref 5–7)
PLATELET # BLD AUTO: 79 10E9/L (ref 150–450)
PLATELET # BLD EST: ABNORMAL 10*3/UL
POTASSIUM SERPL-SCNC: 3.5 MMOL/L (ref 3.4–5.3)
PROT CSF-MCNC: 28 MG/DL (ref 15–60)
PROT SERPL-MCNC: 7.2 G/DL (ref 6.8–8.8)
RBC # BLD AUTO: 5.42 10E12/L (ref 4.4–5.9)
RBC # CSF MANUAL: 0 /UL (ref 0–2)
RBC #/AREA URNS AUTO: 37 /HPF (ref 0–2)
RBC MORPH BLD: NORMAL
SODIUM SERPL-SCNC: 137 MMOL/L (ref 133–144)
SP GR UR STRIP: 1.01 (ref 1–1.03)
SPECIMEN SOURCE: NORMAL
SPECIMEN VOL CSF: 1 ML
SQUAMOUS #/AREA URNS AUTO: <1 /HPF (ref 0–1)
TUBE # CSF: 4 #
URN SPEC COLLECT METH UR: ABNORMAL
UROBILINOGEN UR STRIP-MCNC: >12 MG/DL (ref 0–2)
WBC # BLD AUTO: 6.2 10E9/L (ref 4–11)
WBC # CSF MANUAL: 4 /UL (ref 0–5)
WBC #/AREA URNS AUTO: 74 /HPF (ref 0–2)

## 2017-08-03 PROCEDURE — S0166 INJ OLANZAPINE 2.5MG: HCPCS | Performed by: STUDENT IN AN ORGANIZED HEALTH CARE EDUCATION/TRAINING PROGRAM

## 2017-08-03 PROCEDURE — 99285 EMERGENCY DEPT VISIT HI MDM: CPT | Performed by: STUDENT IN AN ORGANIZED HEALTH CARE EDUCATION/TRAINING PROGRAM

## 2017-08-03 PROCEDURE — 83690 ASSAY OF LIPASE: CPT | Performed by: STUDENT IN AN ORGANIZED HEALTH CARE EDUCATION/TRAINING PROGRAM

## 2017-08-03 PROCEDURE — 96366 THER/PROPH/DIAG IV INF ADDON: CPT

## 2017-08-03 PROCEDURE — 96365 THER/PROPH/DIAG IV INF INIT: CPT

## 2017-08-03 PROCEDURE — 85610 PROTHROMBIN TIME: CPT | Performed by: STUDENT IN AN ORGANIZED HEALTH CARE EDUCATION/TRAINING PROGRAM

## 2017-08-03 PROCEDURE — 85025 COMPLETE CBC W/AUTO DIFF WBC: CPT | Performed by: STUDENT IN AN ORGANIZED HEALTH CARE EDUCATION/TRAINING PROGRAM

## 2017-08-03 PROCEDURE — 87205 SMEAR GRAM STAIN: CPT | Performed by: STUDENT IN AN ORGANIZED HEALTH CARE EDUCATION/TRAINING PROGRAM

## 2017-08-03 PROCEDURE — 40000671 ZZH STATISTIC ANESTHESIA CASE

## 2017-08-03 PROCEDURE — 25000128 H RX IP 250 OP 636: Performed by: STUDENT IN AN ORGANIZED HEALTH CARE EDUCATION/TRAINING PROGRAM

## 2017-08-03 PROCEDURE — 81001 URINALYSIS AUTO W/SCOPE: CPT | Performed by: STUDENT IN AN ORGANIZED HEALTH CARE EDUCATION/TRAINING PROGRAM

## 2017-08-03 PROCEDURE — 82945 GLUCOSE OTHER FLUID: CPT | Performed by: STUDENT IN AN ORGANIZED HEALTH CARE EDUCATION/TRAINING PROGRAM

## 2017-08-03 PROCEDURE — 80320 DRUG SCREEN QUANTALCOHOLS: CPT | Performed by: STUDENT IN AN ORGANIZED HEALTH CARE EDUCATION/TRAINING PROGRAM

## 2017-08-03 PROCEDURE — 37000011 ZZH ANESTHESIA WARD SERVICE: Performed by: NURSE ANESTHETIST, CERTIFIED REGISTERED

## 2017-08-03 PROCEDURE — 99284 EMERGENCY DEPT VISIT MOD MDM: CPT | Mod: 25

## 2017-08-03 PROCEDURE — 70450 CT HEAD/BRAIN W/O DYE: CPT

## 2017-08-03 PROCEDURE — 80053 COMPREHEN METABOLIC PANEL: CPT | Performed by: STUDENT IN AN ORGANIZED HEALTH CARE EDUCATION/TRAINING PROGRAM

## 2017-08-03 PROCEDURE — 25000125 ZZHC RX 250: Performed by: NURSE ANESTHETIST, CERTIFIED REGISTERED

## 2017-08-03 PROCEDURE — 87070 CULTURE OTHR SPECIMN AEROBIC: CPT | Performed by: STUDENT IN AN ORGANIZED HEALTH CARE EDUCATION/TRAINING PROGRAM

## 2017-08-03 PROCEDURE — 62270 DX LMBR SPI PNXR: CPT

## 2017-08-03 PROCEDURE — 96372 THER/PROPH/DIAG INJ SC/IM: CPT

## 2017-08-03 PROCEDURE — 96375 TX/PRO/DX INJ NEW DRUG ADDON: CPT

## 2017-08-03 PROCEDURE — 25000132 ZZH RX MED GY IP 250 OP 250 PS 637: Performed by: STUDENT IN AN ORGANIZED HEALTH CARE EDUCATION/TRAINING PROGRAM

## 2017-08-03 PROCEDURE — 99285 EMERGENCY DEPT VISIT HI MDM: CPT | Mod: 25

## 2017-08-03 PROCEDURE — 62270 DX LMBR SPI PNXR: CPT | Performed by: NURSE ANESTHETIST, CERTIFIED REGISTERED

## 2017-08-03 PROCEDURE — 74020 XR ABDOMEN 2 VW: CPT

## 2017-08-03 PROCEDURE — 89050 BODY FLUID CELL COUNT: CPT | Performed by: STUDENT IN AN ORGANIZED HEALTH CARE EDUCATION/TRAINING PROGRAM

## 2017-08-03 PROCEDURE — 84157 ASSAY OF PROTEIN OTHER: CPT | Performed by: STUDENT IN AN ORGANIZED HEALTH CARE EDUCATION/TRAINING PROGRAM

## 2017-08-03 PROCEDURE — 25000125 ZZHC RX 250: Performed by: STUDENT IN AN ORGANIZED HEALTH CARE EDUCATION/TRAINING PROGRAM

## 2017-08-03 RX ORDER — MAGNESIUM SULFATE HEPTAHYDRATE 500 MG/ML
1 INJECTION, SOLUTION INTRAMUSCULAR; INTRAVENOUS ONCE
Status: DISCONTINUED | OUTPATIENT
Start: 2017-08-03 | End: 2017-08-03 | Stop reason: CLARIF

## 2017-08-03 RX ORDER — DIPHENHYDRAMINE HYDROCHLORIDE 50 MG/ML
25 INJECTION INTRAMUSCULAR; INTRAVENOUS ONCE
Status: COMPLETED | OUTPATIENT
Start: 2017-08-03 | End: 2017-08-03

## 2017-08-03 RX ORDER — AMOXICILLIN 250 MG
1 CAPSULE ORAL ONCE
Status: COMPLETED | OUTPATIENT
Start: 2017-08-03 | End: 2017-08-03

## 2017-08-03 RX ORDER — OLANZAPINE 10 MG/2ML
5 INJECTION, POWDER, FOR SOLUTION INTRAMUSCULAR DAILY PRN
Status: DISCONTINUED | OUTPATIENT
Start: 2017-08-03 | End: 2017-08-03

## 2017-08-03 RX ORDER — LIDOCAINE HYDROCHLORIDE 10 MG/ML
INJECTION, SOLUTION INFILTRATION; PERINEURAL PRN
Status: DISCONTINUED | OUTPATIENT
Start: 2017-08-03 | End: 2017-08-03

## 2017-08-03 RX ORDER — LIDOCAINE HYDROCHLORIDE 10 MG/ML
INJECTION, SOLUTION INFILTRATION; PERINEURAL
Status: DISCONTINUED
Start: 2017-08-03 | End: 2017-08-04 | Stop reason: HOSPADM

## 2017-08-03 RX ORDER — SULFAMETHOXAZOLE/TRIMETHOPRIM 800-160 MG
1 TABLET ORAL 2 TIMES DAILY
Qty: 20 TABLET | Refills: 0 | Status: SHIPPED | OUTPATIENT
Start: 2017-08-03 | End: 2017-08-13

## 2017-08-03 RX ORDER — MAGNESIUM SULFATE 1 G/100ML
1 INJECTION INTRAVENOUS ONCE
Status: COMPLETED | OUTPATIENT
Start: 2017-08-03 | End: 2017-08-03

## 2017-08-03 RX ORDER — OLANZAPINE 10 MG/2ML
5 INJECTION, POWDER, FOR SOLUTION INTRAMUSCULAR ONCE
Status: COMPLETED | OUTPATIENT
Start: 2017-08-03 | End: 2017-08-03

## 2017-08-03 RX ORDER — ACETAMINOPHEN 325 MG/1
650 TABLET ORAL ONCE
Status: COMPLETED | OUTPATIENT
Start: 2017-08-03 | End: 2017-08-03

## 2017-08-03 RX ORDER — DEXAMETHASONE SODIUM PHOSPHATE 10 MG/ML
10 INJECTION, SOLUTION INTRAMUSCULAR; INTRAVENOUS ONCE
Status: COMPLETED | OUTPATIENT
Start: 2017-08-03 | End: 2017-08-03

## 2017-08-03 RX ADMIN — DEXAMETHASONE SODIUM PHOSPHATE 10 MG: 10 INJECTION, SOLUTION INTRAMUSCULAR; INTRAVENOUS at 18:29

## 2017-08-03 RX ADMIN — LIDOCAINE HYDROCHLORIDE 5 ML: 10 INJECTION, SOLUTION INFILTRATION; PERINEURAL at 20:59

## 2017-08-03 RX ADMIN — ACETAMINOPHEN 650 MG: 325 TABLET, FILM COATED ORAL at 20:12

## 2017-08-03 RX ADMIN — LIDOCAINE HYDROCHLORIDE 5 ML: 10 INJECTION, SOLUTION INFILTRATION; PERINEURAL at 20:55

## 2017-08-03 RX ADMIN — MAGNESIUM SULFATE IN DEXTROSE 1 G: 10 INJECTION, SOLUTION INTRAVENOUS at 20:16

## 2017-08-03 RX ADMIN — SENNOSIDES AND DOCUSATE SODIUM 1 TABLET: 8.6; 5 TABLET ORAL at 22:53

## 2017-08-03 RX ADMIN — LIDOCAINE HYDROCHLORIDE 5 ML: 10 INJECTION, SOLUTION INFILTRATION; PERINEURAL at 21:02

## 2017-08-03 RX ADMIN — PROCHLORPERAZINE EDISYLATE 10 MG: 5 INJECTION INTRAMUSCULAR; INTRAVENOUS at 18:26

## 2017-08-03 RX ADMIN — DIPHENHYDRAMINE HYDROCHLORIDE 25 MG: 50 INJECTION, SOLUTION INTRAMUSCULAR; INTRAVENOUS at 18:24

## 2017-08-03 RX ADMIN — SODIUM CHLORIDE 1000 ML: 9 INJECTION, SOLUTION INTRAVENOUS at 18:22

## 2017-08-03 RX ADMIN — LIDOCAINE HYDROCHLORIDE 5 ML: 10 INJECTION, SOLUTION INFILTRATION; PERINEURAL at 20:45

## 2017-08-03 RX ADMIN — OLANZAPINE 5 MG: 10 INJECTION, POWDER, FOR SOLUTION INTRAMUSCULAR at 20:13

## 2017-08-03 RX ADMIN — LIDOCAINE HYDROCHLORIDE 5 ML: 10 INJECTION, SOLUTION INFILTRATION; PERINEURAL at 20:48

## 2017-08-03 NOTE — ED PROVIDER NOTES
History     Chief Complaint   Patient presents with     Nausea & Vomiting     Pt states slammed his finger on Friday and started on Percocet - c/o nausea, vomiting and constipation.      Constipation     Headache     Pt has hx of Meniere's disease - today with headache/migraine     Hematuria     HPI  Santo Toure is a 37 year old deaf male who communicates via sign presenting for evaluation of headache, nausea, vomiting, and constipation. Records confirm that the patient was seen in the department on 7/29/17 for evaluation of left thumb injury, discharged with oral Percocet after trephination of subungual hematoma. Patient stated he was taking medication for relief of the thumb injury, however late on 8/1/17 he developed a severe frontal headache similar in location to previous migraines but more severe. The headache has persisted and he has since developed nausea and numerous episodes of emesis. He finds himself straining to have bowel movements and feels constipated but has been passing hard stool. When straining to have bowel movements he notes right lower abdominal pain but without pain at rest. He has also had painless hematuria without testicular pain or concern for sexual transmitted infection. He denies fevers, neck pain/stiffness, or other concerning symptoms.    I have reviewed the Medications, Allergies, Past Medical and Surgical History, and Social History in the Epic system.    There is no problem list on file for this patient.      No past surgical history on file.    Social History     Social History     Marital status: Single     Spouse name: N/A     Number of children: N/A     Years of education: N/A     Occupational History     Not on file.     Social History Main Topics     Smoking status: Never Smoker     Smokeless tobacco: Not on file     Alcohol use Yes      Comment: on occasion     Drug use: Not on file     Sexual activity: Not on file     Other Topics Concern     Not on file     Social  History Narrative       No family history on file.      There is no immunization history on file for this patient.      Review of Systems  Constitutional: Negative for fevers.  Respiratory: Negative for shortness of breath.  Cardiovascular: Negative for chest pain.  Gastrointestinal: Positive for nausea, vomiting, and constipation. Denies blood with bowel movements.  Genitourinary: As a painless hematuria. Negative for dysuria, testicular or scrotal pain.  Musculoskeletal: Negative for recent injuries.  Neurological: Positive for severe frontal headache. Dizziness which she attributes to Ménière's disease. Negative for weakness.    All others reviewed and are negative.      Physical Exam   BP: 124/79  Pulse: 93  Resp: 16  Height: 182.9 cm (6')  Weight: 104.3 kg (230 lb)  SpO2: 100 %  Physical Exam  Constitutional: Well developed, well nourished. Appears nontoxic but moderately uncomfortable resting on the gurney.  Head: Normocephalic and atraumatic. Symmetric in appearance.  Eyes: Conjunctivae are normal. EOMI. Negative for nystagmus.  Neck: Neck supple and without nuchal rigidity.  Cardiovascular: No cyanosis. RRR. No audible murmurs noted.   Respiratory: Effort normal, no respiratory distress. CTAB without diminished regions. No wheezing, rhonchi, or crackles.  Gastrointestinal: Soft, nondistended abdomen. Nontender and without guarding. No rigidity or rebound tenderness. Negative McBurney's point. Negative for Conteh's sign. No palpable pulsatile mass. Benign abdomen.   Musculoskeletal: Moves all extremities spontaneously and without complaint. Left thumb subungual hematoma and contusion surrounding nail.   Neuro: Patient is alert. Finger-nose-finger intact and equal bilaterally.  Skin: Skin is warm and dry, not diaphoretic.      ED Course     ED Course     Procedures             Critical Care time:  none               Results for orders placed or performed during the hospital encounter of 08/03/17 (from the past  24 hour(s))   UA reflex to Microscopic   Result Value Ref Range    Color Urine Yellow     Appearance Urine Clear     Glucose Urine Negative NEG mg/dL    Bilirubin Urine Negative NEG    Ketones Urine 80 (A) NEG mg/dL    Specific Gravity Urine 1.010 1.003 - 1.035    Blood Urine Small (A) NEG    pH Urine 8.0 (H) 5.0 - 7.0 pH    Protein Albumin Urine 30 (A) NEG mg/dL    Urobilinogen mg/dL >12.0 (H) 0.0 - 2.0 mg/dL    Nitrite Urine Negative NEG    Leukocyte Esterase Urine Large (A) NEG    Source Unspecified Urine     RBC Urine 37 (H) 0 - 2 /HPF    WBC Urine 74 (H) 0 - 2 /HPF    Squamous Epithelial /HPF Urine <1 0 - 1 /HPF    Mucous Urine Present (A) NEG /LPF   CBC with platelets differential   Result Value Ref Range    WBC 6.2 4.0 - 11.0 10e9/L    RBC Count 5.42 4.4 - 5.9 10e12/L    Hemoglobin 15.0 13.3 - 17.7 g/dL    Hematocrit 43.7 40.0 - 53.0 %    MCV 81 78 - 100 fl    MCH 27.7 26.5 - 33.0 pg    MCHC 34.3 31.5 - 36.5 g/dL    RDW 13.0 10.0 - 15.0 %    Platelet Count 79 (L) 150 - 450 10e9/L    Diff Method Manual Differential     % Neutrophils 86.0 %    % Lymphocytes 10.0 %    % Monocytes 4.0 %    % Eosinophils 0.0 %    % Basophils 0.0 %    Absolute Neutrophil 5.3 1.6 - 8.3 10e9/L    Absolute Lymphocytes 0.6 (L) 0.8 - 5.3 10e9/L    Absolute Monocytes 0.2 0.0 - 1.3 10e9/L    Absolute Eosinophils 0.0 0.0 - 0.7 10e9/L    Absolute Basophils 0.0 0.0 - 0.2 10e9/L    RBC Morphology Normal     Platelet Estimate Decreased    Comprehensive metabolic panel   Result Value Ref Range    Sodium 137 133 - 144 mmol/L    Potassium 3.5 3.4 - 5.3 mmol/L    Chloride 103 94 - 109 mmol/L    Carbon Dioxide 27 20 - 32 mmol/L    Anion Gap 7 3 - 14 mmol/L    Glucose 98 70 - 99 mg/dL    Urea Nitrogen 8 7 - 30 mg/dL    Creatinine 0.70 0.66 - 1.25 mg/dL    GFR Estimate >90  Non  GFR Calc   >60 mL/min/1.7m2    GFR Estimate If Black >90   GFR Calc   >60 mL/min/1.7m2    Calcium 9.4 8.5 - 10.1 mg/dL    Bilirubin Total 1.0  0.2 - 1.3 mg/dL    Albumin 3.3 (L) 3.4 - 5.0 g/dL    Protein Total 7.2 6.8 - 8.8 g/dL    Alkaline Phosphatase 101 40 - 150 U/L    ALT 32 0 - 70 U/L    AST 28 0 - 45 U/L   Lipase   Result Value Ref Range    Lipase 78 73 - 393 U/L   Alcohol ethyl   Result Value Ref Range    Ethanol g/dL <0.01 <0.01 g/dL   INR   Result Value Ref Range    INR 0.98 0.86 - 1.14   Head CT w/o contrast    Narrative    CT HEAD W/O CONTRAST   8/3/2017 6:52 PM     HISTORY: Severe headache    TECHNIQUE:  Axial images of the head without IV contrast material.  Radiation dose for this scan was reduced using automated exposure  control, adjustment of the mA and/or kV according to patient size, or  iterative reconstruction technique.    COMPARISON: None.    FINDINGS: The ventricles are normal in size, shape and configuration.  The brain parenchyma and subarachnoid spaces are normal. There is no  evidence of intracranial hemorrhage, mass, acute infarct or anomaly.  The visualized portions of the sinuses and mastoids appear normal.  There is no evidence of trauma.      Impression    IMPRESSION:  No acute pathology, no bleed, mass, or infarcts are seen.    SUJIT VELÁZQUEZ MD   XR Abdomen 2 Views    Narrative    ABDOMEN TWO VIEWS  8/3/2017  7:02 PM    HISTORY: 37-year-old with nausea, vomiting, and constipation.    COMPARISON: None.      Impression    IMPRESSION: No intraperitoneal air. No dilated air-filled loops of  small bowel. Mild amount of stool noted throughout the colon.    SYLVIE BENITEZ MD   Glucose CSF: Tube 2   Result Value Ref Range    Glucose CSF 58 40 - 70 mg/dL   Protein total CSF: Tube 2   Result Value Ref Range    Protein Total CSF 28 15 - 60 mg/dL   Gram stain   Result Value Ref Range    Specimen Description Cerebrospinal fluid     Special Requests Gram smear performed on concentrated specimen     Gram Stain No WBC'S seen  No organisms seen       Micro Report Status FINAL 08/03/2017    CSF Culture Aerobic Bacterial   Result Value Ref  Range    Specimen Description Cerebrospinal fluid     Special Requests Culture performed on concentrated specimen     Culture Micro Pending     Micro Report Status Pending    Cell count with differential CSF: Tube 4   Result Value Ref Range    WBC CSF 4 0 - 5 /uL    RBC CSF 0 0 - 2 /uL    Tube Number 4 #    Volume 1 mL    Color CSF Colorless CLRL    Appearance CSF Clear CLER       8:01 PM  Patient's headache remains relatively unchanged. CT scan is without identifiable subarachnoid hemorrhage or acute pathology, however severe headache onset began >24 hours ago. He understands that subarachnoid hemorrhage cannot be definitively ruled out without a lumbar puncture and has verbally consented, will be formally consented by anesthesia for undergoing procedure.      Assessments & Plan (with Medical Decision Making)   Santo Toure is a 37 year old male who presents to the department for evaluation of nausea, vomiting, constipation, and also severe headache of nearly 3 days duration. The patient believes his gastrointestinal symptoms are due to previously prescribed Percocet for a contusion of his left thumb. He has since discontinued use of the opiate medication but is straining to pass hard stool. His abdominal examination is benign, he is afebrile, CBC without leukocytosis and radiograph without sign of obstruction.    Regarding patient's acute onset headache, the location is similar to previous migraines but he maintains that it is more severe. CT imaging of head/brain does not identify subarachnoid hemorrhage but imaging was performed days after onset. Anesthesia performed lumbar puncture after obtaining consent, tube 4 is without RBC and apparently no visualization of xanthochromia. Subarachnoid hemorrhage has then ruled out, CSF is also without sign of infection.    Finally, patient noticed some blood with urination and urinalysis positive for large amount of leukocyte esterase. Nitrate is negative, culture sent.  He does not have symptoms of epididymitis or proctitis and denies concerns for sexually transmitted infection. He will be started on oral antibiotic Bactrim for complicated urinary tract infection. Prior to discharge, I made it clear that illness can unexpectedly develop/progress so he has been instructed to return to the emergency department for reevaluation of evolving symptoms, change in severity, or other concerns. He seems comfortable with the discharge plan we discussed including follow up.    Disclaimer: This note consists of symbols derived from keyboarding, dictation, and/or voice recognition software. As a result, there may be errors in the script that have gone undetected.  Please consider this when interpreting information found in the chart.        I have reviewed the nursing notes.    I have reviewed the findings, diagnosis, plan and need for follow up with the patient.       New Prescriptions    SULFAMETHOXAZOLE-TRIMETHOPRIM (BACTRIM DS) 800-160 MG PER TABLET    Take 1 tablet by mouth 2 times daily for 10 days       Final diagnoses:   Urinary tract infection with hematuria, site unspecified   Drug-induced constipation   Non-intractable vomiting with nausea, unspecified vomiting type   Headache, unspecified headache type   Contusion of left thumb without damage to nail, initial encounter       8/3/2017   [facility] EMERGENCY DEPARTMENT     Asif Hicks,   08/03/17 [id number]

## 2017-08-03 NOTE — ED AVS SNAPSHOT
[facility]    [address] [facility] [address]    Phone:  [phone]    Fax:  [phone]                                       Santo Toure   MRN: [id number]    Department:  [facility]   Date of Visit:  8/3/2017           Patient Information     Date Of Birth          1980        Your diagnoses for this visit were:     Urinary tract infection with hematuria, site unspecified     Drug-induced constipation     Non-intractable vomiting with nausea, unspecified vomiting type     Headache, unspecified headache type     Contusion of left thumb without damage to nail, initial encounter        You were seen by Asif Hicks DO.      Follow-up Information     Follow up with [facility] In 3 days.    Why:  Followup for reevaluation if symptoms persist.    Contact information:    [address] [place] [address] [phone]        Follow up with [facility]. Schedule an appointment as soon as possible for a visit in 1 week.    Specialty:  Urology    Why:  For reevaluation after urinary tract infection.    Contact information:    [facility], [place] [address] [phone]    Additional information:    The medical center is located at   [address] [place] [address] (between [address]   [address] in [place], [place] north   of [place]).      Discharge References/Attachments     CONSTIPATION (ADULT) (ENGLISH)    HEADACHE, UNSPECIFIED (ENGLISH)      24 Hour Appointment Hotline       To make an appointment at any [facility], call [phone] ([phone]). If you don't have a family doctor or clinic, we will help you find one. [facility] are conveniently located to serve the needs of you and your family.             Review of your medicines      START taking        Dose / Directions Last dose taken    sulfamethoxazole-trimethoprim 800-160 MG per tablet   Commonly known as:   BACTRIM DS   Dose:  1 tablet   Quantity:  20 tablet        Take 1 tablet by mouth 2 times daily for 10 days   Refills:  0          Our records show that you are taking the medicines listed below. If these are incorrect, please call your family doctor or clinic.        Dose / Directions Last dose taken    * albuterol 108 (90 BASE) MCG/ACT Inhaler   Commonly known as:  PROAIR HFA/PROVENTIL HFA/VENTOLIN HFA   Dose:  2 puff   Quantity:  1 Inhaler        Inhale 2 puffs into the lungs every 6 hours as needed for shortness of breath / dyspnea or wheezing   Refills:  1        * albuterol 108 (90 BASE) MCG/ACT Inhaler   Commonly known as:  PROAIR HFA/PROVENTIL HFA/VENTOLIN HFA   Dose:  2 puff   Quantity:  1 Inhaler        Inhale 2 puffs into the lungs every 6 hours as needed for shortness of breath / dyspnea or wheezing   Refills:  0        menthol 7 MG Lozg   Commonly known as:  COUGH DROP   Dose:  1 lozenge        Take 1 lozenge by mouth every hour as needed for cough   Refills:  0        oxyCODONE-acetaminophen 5-325 MG per tablet   Commonly known as:  PERCOCET   Dose:  1-2 tablet   Quantity:  10 tablet        Take 1-2 tablets by mouth every 4 hours as needed for pain maximum 8 tablet(s) per day   Refills:  0        predniSONE 20 MG tablet   Commonly known as:  DELTASONE   Quantity:  10 tablet        Take one tablet twice a day for 5 days.   Refills:  0        * Notice:  This list has 2 medication(s) that are the same as other medications prescribed for you. Read the directions carefully, and ask your doctor or other care provider to review them with you.            Prescriptions were sent or printed at these locations (1 Prescription)                   Other Prescriptions                Printed at Department/Unit printer (1 of 1)         sulfamethoxazole-trimethoprim (BACTRIM DS) 800-160 MG per tablet                Procedures and tests performed during your visit     Alcohol ethyl    CBC with platelets differential     CSF Culture Aerobic Bacterial    Cell count with differential CSF: Tube 4    Comprehensive metabolic panel    Glucose CSF: Tube 2    Gram stain    Head CT w/o contrast    INR    Lipase    Peripheral IV: Standard    Protein total CSF: Tube 2    Pulse oximetry nursing    UA reflex to Microscopic    Vital signs    XR Abdomen 2 Views      Orders Needing Specimen Collection     None      Pending Results     Date and Time Order Name Status Description    8/3/2017 2000 CSF Culture Aerobic Bacterial Preliminary     8/3/2017 2000 Gram stain Preliminary             Pending Culture Results     Date and Time Order Name Status Description    8/3/2017 2000 CSF Culture Aerobic Bacterial Preliminary     8/3/2017 2000 Gram stain Preliminary             Pending Results Instructions     If you had any lab results that were not finalized at the time of your Discharge, you can call the ED Lab Result RN at [phone]. You will be contacted by this team for any positive Lab results or changes in treatment. The nurses are available 7 days a week from 10A to 6:30P.  You can leave a message 24 hours per day and they will return your call.        Test Results From Your Hospital Stay        8/3/2017  7:30 PM      Component Results     Component Value Ref Range & Units Status    WBC 6.2 4.0 - 11.0 10e9/L Final    RBC Count 5.42 4.4 - 5.9 10e12/L Final    Hemoglobin 15.0 13.3 - 17.7 g/dL Final    Hematocrit 43.7 40.0 - 53.0 % Final    MCV 81 78 - 100 fl Final    MCH 27.7 26.5 - 33.0 pg Final    MCHC 34.3 31.5 - 36.5 g/dL Final    RDW 13.0 10.0 - 15.0 % Final    Platelet Count 79 (L) 150 - 450 10e9/L Final    Diff Method Manual Differential  Final    % Neutrophils 86.0 % Final    % Lymphocytes 10.0 % Final    % Monocytes 4.0 % Final    % Eosinophils 0.0 % Final    % Basophils 0.0 % Final    Absolute Neutrophil 5.3 1.6 - 8.3 10e9/L Final    Absolute Lymphocytes 0.6 (L) 0.8 - 5.3 10e9/L Final    Absolute Monocytes 0.2 0.0 - 1.3 10e9/L Final     Absolute Eosinophils 0.0 0.0 - 0.7 10e9/L Final    Absolute Basophils 0.0 0.0 - 0.2 10e9/L Final    RBC Morphology Normal  Final    Platelet Estimate Decreased  Final         8/3/2017  6:42 PM      Component Results     Component Value Ref Range & Units Status    Sodium 137 133 - 144 mmol/L Final    Potassium 3.5 3.4 - 5.3 mmol/L Final    Chloride 103 94 - 109 mmol/L Final    Carbon Dioxide 27 20 - 32 mmol/L Final    Anion Gap 7 3 - 14 mmol/L Final    Glucose 98 70 - 99 mg/dL Final    Urea Nitrogen 8 7 - 30 mg/dL Final    Creatinine 0.70 0.66 - 1.25 mg/dL Final    GFR Estimate >90  Non  GFR Calc   >60 mL/min/1.7m2 Final    GFR Estimate If Black >90   GFR Calc   >60 mL/min/1.7m2 Final    Calcium 9.4 8.5 - 10.1 mg/dL Final    Bilirubin Total 1.0 0.2 - 1.3 mg/dL Final    Albumin 3.3 (L) 3.4 - 5.0 g/dL Final    Protein Total 7.2 6.8 - 8.8 g/dL Final    Alkaline Phosphatase 101 40 - 150 U/L Final    ALT 32 0 - 70 U/L Final    AST 28 0 - 45 U/L Final         8/3/2017  6:40 PM      Component Results     Component Value Ref Range & Units Status    Lipase 78 73 - 393 U/L Final         8/3/2017  6:58 PM      Component Results     Component Value Ref Range & Units Status    Color Urine Yellow  Final    Appearance Urine Clear  Final    Glucose Urine Negative NEG mg/dL Final    Bilirubin Urine Negative NEG Final    Ketones Urine 80 (A) NEG mg/dL Final    Specific Gravity Urine 1.010 1.003 - 1.035 Final    Blood Urine Small (A) NEG Final    pH Urine 8.0 (H) 5.0 - 7.0 pH Final    Protein Albumin Urine 30 (A) NEG mg/dL Final    Urobilinogen mg/dL >12.0 (H) 0.0 - 2.0 mg/dL Final    Nitrite Urine Negative NEG Final    Leukocyte Esterase Urine Large (A) NEG Final    Source Unspecified Urine  Final    RBC Urine 37 (H) 0 - 2 /HPF Final    WBC Urine 74 (H) 0 - 2 /HPF Final    Squamous Epithelial /HPF Urine <1 0 - 1 /HPF Final    Mucous Urine Present (A) NEG /LPF Final         8/3/2017  7:11 PM       Narrative     ABDOMEN TWO VIEWS  8/3/2017  7:02 PM    HISTORY: 37-year-old with nausea, vomiting, and constipation.    COMPARISON: None.        Impression     IMPRESSION: No intraperitoneal air. No dilated air-filled loops of  small bowel. Mild amount of stool noted throughout the colon.    SYLVIE BENITEZ MD         8/3/2017  9:48 PM      Narrative     CT HEAD W/O CONTRAST   8/3/2017 6:52 PM     HISTORY: Severe headache    TECHNIQUE:  Axial images of the head without IV contrast material.  Radiation dose for this scan was reduced using automated exposure  control, adjustment of the mA and/or kV according to patient size, or  iterative reconstruction technique.    COMPARISON: None.    FINDINGS: The ventricles are normal in size, shape and configuration.  The brain parenchyma and subarachnoid spaces are normal. There is no  evidence of intracranial hemorrhage, mass, acute infarct or anomaly.  The visualized portions of the sinuses and mastoids appear normal.  There is no evidence of trauma.        Impression     IMPRESSION:  No acute pathology, no bleed, mass, or infarcts are seen.    SUJIT VELÁZQUEZ MD         8/3/2017  6:58 PM      Component Results     Component Value Ref Range & Units Status    Ethanol g/dL <0.01 <0.01 g/dL Final         8/3/2017  7:02 PM      Component Results     Component Value Ref Range & Units Status    INR 0.98 0.86 - 1.14 Final         8/3/2017  9:51 PM      Component Results     Component Value Ref Range & Units Status    Glucose CSF 58 40 - 70 mg/dL Final    CSF glucose concentrations are about 60 percent of normal plasma glucose.         8/3/2017  9:51 PM      Component Results     Component Value Ref Range & Units Status    Protein Total CSF 28 15 - 60 mg/dL Final         8/3/2017  9:51 PM      Component Results     Component    Specimen Description    Cerebrospinal fluid    Special Requests    Gram smear performed on concentrated specimen    Gram Stain    Pending    Micro Report Status     Pending         8/3/2017  9:50 PM      Component Results     Component    Specimen Description    Cerebrospinal fluid    Special Requests    Culture performed on concentrated specimen    Culture Micro    Pending    Micro Report Status    Pending         8/3/2017 10:29 PM      Component Results     Component Value Ref Range & Units Status    WBC CSF 4 0 - 5 /uL Final    RBC CSF 0 0 - 2 /uL Final                Thank you for choosing [facility]       Thank you for choosing [facility] for your care. Our goal is always to provide you with excellent care. Hearing back from our patients is one way we can continue to improve our services. Please take a few minutes to complete the written survey that you may receive in the mail after you visit with us. Thank you!        [email or web address] Information     [facility] gives you secure access to your electronic health record. If you see a primary care provider, you can also send messages to your care team and make appointments. If you have questions, please call your primary care clinic.  If you do not have a primary care provider, please call [phone] and they will assist you.        Care EveryWhere ID     This is your Care EveryWhere ID. This could be used by other organizations to access your [facility] medical records  [id number]        Equal Access to Services     ANTONINA ALBA : Hadii teo Victoria, wamichael magaña, qaybluis bazzialnaomy reza, mookie rich. So [facility] [phone].    ATENCIÓN: Si habla español, tiene a marshall disposición servicios gratuitos de asistencia lingüística. Llame al [phone].    We comply with applicable federal civil rights laws and [place] laws. We do not discriminate on the basis of race, color, national origin, age, disability sex, sexual orientation or gender identity.            After Visit Summary       This is your record. Keep this with you and show to your community pharmacist(s) and doctor(s) at your next  visit.

## 2017-08-03 NOTE — ED NOTES
Pt injured his thumb last Friday and has been taking percocet for pain. He has had increased nausea, vomiting, constipation and blood in his urine since starting on the percocet. Last BM was this morning. He also hs had a headache since Tuesday night. He has a history of migraines and says this headache is the worst he has ever experienced. Pr is deaf, Ipad  used to obtain history and assessment.

## 2017-08-03 NOTE — ED AVS SNAPSHOT
[facility]    [address] [facility] [address]    Phone:  [phone]    Fax:  [phone]                                       Santo Toure   MRN: [id number]    Department:  [facility]   Date of Visit:  8/3/2017           After Visit Summary Signature Page     I have received my discharge instructions, and my questions have been answered. I have discussed any challenges I see with this plan with the nurse or doctor.    ..........................................................................................................................................  Patient/Patient Representative Signature      ..........................................................................................................................................  Patient Representative Print Name and Relationship to Patient    ..................................................               ................................................  Date                                            Time    ..........................................................................................................................................  Reviewed by Signature/Title    ...................................................              ..............................................  Date                                                            Time

## 2017-08-04 NOTE — ANESTHESIA PREPROCEDURE EVALUATION
Anesthesia Plan  Procedure only, no anesthetic delivered    History & Physical Review  History and physical reviewed and following examination; no interval change.    ASA Status:  1 emergent.    NPO Status:  Waived due to emergency    Plan for Other     Lumbar puncture      Postoperative Care      Consents  Anesthetic plan, risks, benefits and alternatives discussed with:  Patient and Other (See Comment) (via )..                          .

## 2017-08-04 NOTE — ANESTHESIA PROCEDURE NOTES
Peripheral nerve/Neuraxial procedure note : lumbar puncture  Pre-Procedure  Performed by  KOURTNEY BENOIT   Location: ED      Pre-Anesthestic Checklist: patient identified, risks and benefits discussed, informed consent, monitors and equipment checked, pre-op evaluation and at physician/surgeon's request    Timeout  Correct Patient: Yes   Correct Procedure: Yes   Correct Site: Yes   Correct Laterality: N/A   Correct Position: Yes   Site Marked: N/A   .   Procedure Documentation  ASA 2 and Emergent  .    Procedure:    Lumbar puncture.  Insertion Site:L3-4, L4-5  (midline approach)      Patient Prep;povidone-iodine 7.5% surgical scrub.  .  Needle: Adilene tip Spinal Needle (gauge): 22  Spinal/LP Needle Length (inches): 3.5 # of attempts: 3 and  # of redirects:  3 No introducer used .       Assessment/Narrative  Paresthesias: No.  .  .  5 mL of clear CSF fluid removed while lateral   . Comments:  Difficult LP due to patient positioning, needing to keep head up to see .  Attempted sitting without success, successful in left lateral decubitus position.  Excess subq tissue and deep subarachnoid space.  Pt. Supine x 30min post-procedure.  No complications, specimen labeled and hand delivered to lab.

## 2017-08-04 NOTE — ED NOTES
Assisted Raimundo Mello CRNA with LP at bedside. LUIS Nugent at bedside for interpretation. Samples hand carried to the lab.

## 2017-08-09 LAB
BACTERIA SPEC CULT: NORMAL
Lab: NORMAL
MICRO REPORT STATUS: NORMAL
SPECIMEN SOURCE: NORMAL

## 2017-09-13 ENCOUNTER — APPOINTMENT (OUTPATIENT)
Dept: CT IMAGING | Facility: CLINIC | Age: 37
End: 2017-09-13
Attending: EMERGENCY MEDICINE
Payer: COMMERCIAL

## 2017-09-13 ENCOUNTER — OFFICE VISIT (OUTPATIENT)
Dept: FAMILY MEDICINE | Facility: CLINIC | Age: 37
End: 2017-09-13
Payer: COMMERCIAL

## 2017-09-13 ENCOUNTER — HOSPITAL ENCOUNTER (OUTPATIENT)
Facility: CLINIC | Age: 37
Setting detail: OBSERVATION
Discharge: HOME | End: 2017-09-15
Attending: EMERGENCY MEDICINE | Admitting: FAMILY MEDICINE
Payer: COMMERCIAL

## 2017-09-13 VITALS
HEART RATE: 76 BPM | DIASTOLIC BLOOD PRESSURE: 88 MMHG | WEIGHT: 216.4 LBS | HEIGHT: 71 IN | TEMPERATURE: 97.7 F | OXYGEN SATURATION: 96 % | SYSTOLIC BLOOD PRESSURE: 138 MMHG | BODY MASS INDEX: 30.3 KG/M2

## 2017-09-13 DIAGNOSIS — N20.1 CALCULI, URETER: Primary | ICD-10-CM

## 2017-09-13 DIAGNOSIS — R10.31 RLQ ABDOMINAL PAIN: Primary | ICD-10-CM

## 2017-09-13 DIAGNOSIS — Z00.00 HEALTH CARE MAINTENANCE: ICD-10-CM

## 2017-09-13 DIAGNOSIS — N10 ACUTE PYELONEPHRITIS: ICD-10-CM

## 2017-09-13 PROBLEM — N20.0 KIDNEY STONE: Status: ACTIVE | Noted: 2017-09-13

## 2017-09-13 LAB
ALBUMIN SERPL-MCNC: 4 G/DL (ref 3.4–5)
ALBUMIN UR-MCNC: 10 MG/DL
ALP SERPL-CCNC: 87 U/L (ref 40–150)
ALT SERPL W P-5'-P-CCNC: 27 U/L (ref 0–70)
ANION GAP SERPL CALCULATED.3IONS-SCNC: 7 MMOL/L (ref 3–14)
APPEARANCE UR: ABNORMAL
AST SERPL W P-5'-P-CCNC: 12 U/L (ref 0–45)
BACTERIA #/AREA URNS HPF: ABNORMAL /HPF
BASOPHILS # BLD AUTO: 0.1 10E9/L (ref 0–0.2)
BASOPHILS NFR BLD AUTO: 0.3 %
BILIRUB SERPL-MCNC: 0.5 MG/DL (ref 0.2–1.3)
BILIRUB UR QL STRIP: NEGATIVE
BUN SERPL-MCNC: 6 MG/DL (ref 7–30)
CALCIUM SERPL-MCNC: 9.3 MG/DL (ref 8.5–10.1)
CHLORIDE SERPL-SCNC: 105 MMOL/L (ref 94–109)
CO2 SERPL-SCNC: 28 MMOL/L (ref 20–32)
COLOR UR AUTO: YELLOW
CREAT SERPL-MCNC: 0.74 MG/DL (ref 0.66–1.25)
DIFFERENTIAL METHOD BLD: ABNORMAL
EOSINOPHIL # BLD AUTO: 0.1 10E9/L (ref 0–0.7)
EOSINOPHIL NFR BLD AUTO: 0.8 %
ERYTHROCYTE [DISTWIDTH] IN BLOOD BY AUTOMATED COUNT: 13.2 % (ref 10–15)
GFR SERPL CREATININE-BSD FRML MDRD: >90 ML/MIN/1.7M2
GLUCOSE SERPL-MCNC: 100 MG/DL (ref 70–99)
GLUCOSE UR STRIP-MCNC: NEGATIVE MG/DL
HCT VFR BLD AUTO: 44.7 % (ref 40–53)
HGB BLD-MCNC: 14.9 G/DL (ref 13.3–17.7)
HGB UR QL STRIP: ABNORMAL
IMM GRANULOCYTES # BLD: 0 10E9/L (ref 0–0.4)
IMM GRANULOCYTES NFR BLD: 0.2 %
KETONES UR STRIP-MCNC: NEGATIVE MG/DL
LEUKOCYTE ESTERASE UR QL STRIP: ABNORMAL
LYMPHOCYTES # BLD AUTO: 2.1 10E9/L (ref 0.8–5.3)
LYMPHOCYTES NFR BLD AUTO: 11.1 %
MCH RBC QN AUTO: 27 PG (ref 26.5–33)
MCHC RBC AUTO-ENTMCNC: 33.3 G/DL (ref 31.5–36.5)
MCV RBC AUTO: 81 FL (ref 78–100)
MONOCYTES # BLD AUTO: 1.2 10E9/L (ref 0–1.3)
MONOCYTES NFR BLD AUTO: 6.6 %
NEUTROPHILS # BLD AUTO: 15 10E9/L (ref 1.6–8.3)
NEUTROPHILS NFR BLD AUTO: 81 %
NITRATE UR QL: NEGATIVE
PH UR STRIP: 7 PH (ref 5–7)
PLATELET # BLD AUTO: 181 10E9/L (ref 150–450)
POTASSIUM SERPL-SCNC: 4 MMOL/L (ref 3.4–5.3)
PROT SERPL-MCNC: 7.5 G/DL (ref 6.8–8.8)
RBC # BLD AUTO: 5.51 10E12/L (ref 4.4–5.9)
RBC #/AREA URNS AUTO: 6 /HPF (ref 0–2)
SODIUM SERPL-SCNC: 140 MMOL/L (ref 133–144)
SOURCE: ABNORMAL
SP GR UR STRIP: 1.01 (ref 1–1.03)
SQUAMOUS #/AREA URNS AUTO: <1 /HPF (ref 0–1)
UROBILINOGEN UR STRIP-MCNC: NORMAL MG/DL (ref 0–2)
WBC # BLD AUTO: 18.5 10E9/L (ref 4–11)
WBC #/AREA URNS AUTO: 26 /HPF (ref 0–2)

## 2017-09-13 PROCEDURE — 99207 ZZC OFFICE-HOSPITAL ADMIT: CPT | Performed by: NURSE PRACTITIONER

## 2017-09-13 PROCEDURE — 25000128 H RX IP 250 OP 636: Performed by: EMERGENCY MEDICINE

## 2017-09-13 PROCEDURE — 96374 THER/PROPH/DIAG INJ IV PUSH: CPT

## 2017-09-13 PROCEDURE — 87186 SC STD MICRODIL/AGAR DIL: CPT | Performed by: FAMILY MEDICINE

## 2017-09-13 PROCEDURE — 96375 TX/PRO/DX INJ NEW DRUG ADDON: CPT

## 2017-09-13 PROCEDURE — 25000132 ZZH RX MED GY IP 250 OP 250 PS 637: Performed by: FAMILY MEDICINE

## 2017-09-13 PROCEDURE — 96376 TX/PRO/DX INJ SAME DRUG ADON: CPT

## 2017-09-13 PROCEDURE — 81001 URINALYSIS AUTO W/SCOPE: CPT | Performed by: FAMILY MEDICINE

## 2017-09-13 PROCEDURE — 87088 URINE BACTERIA CULTURE: CPT | Performed by: FAMILY MEDICINE

## 2017-09-13 PROCEDURE — 87086 URINE CULTURE/COLONY COUNT: CPT | Performed by: FAMILY MEDICINE

## 2017-09-13 PROCEDURE — 99285 EMERGENCY DEPT VISIT HI MDM: CPT | Mod: 25

## 2017-09-13 PROCEDURE — 99285 EMERGENCY DEPT VISIT HI MDM: CPT | Mod: 25 | Performed by: EMERGENCY MEDICINE

## 2017-09-13 PROCEDURE — 99218 ZZC INITIAL OBSERVATION CARE,LEVL I: CPT | Performed by: FAMILY MEDICINE

## 2017-09-13 PROCEDURE — 25000128 H RX IP 250 OP 636: Performed by: FAMILY MEDICINE

## 2017-09-13 PROCEDURE — 80053 COMPREHEN METABOLIC PANEL: CPT | Performed by: EMERGENCY MEDICINE

## 2017-09-13 PROCEDURE — 96361 HYDRATE IV INFUSION ADD-ON: CPT

## 2017-09-13 PROCEDURE — G0378 HOSPITAL OBSERVATION PER HR: HCPCS

## 2017-09-13 PROCEDURE — 74177 CT ABD & PELVIS W/CONTRAST: CPT

## 2017-09-13 PROCEDURE — 85025 COMPLETE CBC W/AUTO DIFF WBC: CPT | Performed by: EMERGENCY MEDICINE

## 2017-09-13 PROCEDURE — 25000125 ZZHC RX 250: Performed by: EMERGENCY MEDICINE

## 2017-09-13 RX ORDER — ONDANSETRON 2 MG/ML
4 INJECTION INTRAMUSCULAR; INTRAVENOUS EVERY 6 HOURS PRN
Status: DISCONTINUED | OUTPATIENT
Start: 2017-09-13 | End: 2017-09-15 | Stop reason: HOSPADM

## 2017-09-13 RX ORDER — KETOROLAC TROMETHAMINE 30 MG/ML
30 INJECTION, SOLUTION INTRAMUSCULAR; INTRAVENOUS ONCE
Status: COMPLETED | OUTPATIENT
Start: 2017-09-13 | End: 2017-09-13

## 2017-09-13 RX ORDER — TAMSULOSIN HYDROCHLORIDE 0.4 MG/1
0.4 CAPSULE ORAL DAILY
Status: DISCONTINUED | OUTPATIENT
Start: 2017-09-13 | End: 2017-09-15 | Stop reason: HOSPADM

## 2017-09-13 RX ORDER — POLYETHYLENE GLYCOL 3350 17 G/17G
17 POWDER, FOR SOLUTION ORAL DAILY PRN
Status: DISCONTINUED | OUTPATIENT
Start: 2017-09-13 | End: 2017-09-15 | Stop reason: HOSPADM

## 2017-09-13 RX ORDER — METOCLOPRAMIDE HYDROCHLORIDE 5 MG/ML
10 INJECTION INTRAMUSCULAR; INTRAVENOUS ONCE
Status: COMPLETED | OUTPATIENT
Start: 2017-09-13 | End: 2017-09-13

## 2017-09-13 RX ORDER — ONDANSETRON 2 MG/ML
4 INJECTION INTRAMUSCULAR; INTRAVENOUS EVERY 30 MIN PRN
Status: COMPLETED | OUTPATIENT
Start: 2017-09-13 | End: 2017-09-13

## 2017-09-13 RX ORDER — KETOROLAC TROMETHAMINE 30 MG/ML
30 INJECTION, SOLUTION INTRAMUSCULAR; INTRAVENOUS EVERY 6 HOURS PRN
Status: DISCONTINUED | OUTPATIENT
Start: 2017-09-13 | End: 2017-09-15 | Stop reason: HOSPADM

## 2017-09-13 RX ORDER — LIDOCAINE 40 MG/G
CREAM TOPICAL
Status: DISCONTINUED | OUTPATIENT
Start: 2017-09-13 | End: 2017-09-15 | Stop reason: HOSPADM

## 2017-09-13 RX ORDER — PROCHLORPERAZINE 25 MG
25 SUPPOSITORY, RECTAL RECTAL EVERY 12 HOURS PRN
Status: DISCONTINUED | OUTPATIENT
Start: 2017-09-13 | End: 2017-09-15 | Stop reason: HOSPADM

## 2017-09-13 RX ORDER — ALBUTEROL SULFATE 90 UG/1
2 AEROSOL, METERED RESPIRATORY (INHALATION) EVERY 6 HOURS PRN
Status: DISCONTINUED | OUTPATIENT
Start: 2017-09-13 | End: 2017-09-15 | Stop reason: HOSPADM

## 2017-09-13 RX ORDER — ONDANSETRON 2 MG/ML
4 INJECTION INTRAMUSCULAR; INTRAVENOUS EVERY 30 MIN PRN
Status: DISCONTINUED | OUTPATIENT
Start: 2017-09-13 | End: 2017-09-13

## 2017-09-13 RX ORDER — MORPHINE SULFATE 4 MG/ML
4 INJECTION, SOLUTION INTRAMUSCULAR; INTRAVENOUS
Status: DISCONTINUED | OUTPATIENT
Start: 2017-09-13 | End: 2017-09-13

## 2017-09-13 RX ORDER — ACETAMINOPHEN 500 MG
1000 TABLET ORAL EVERY 6 HOURS PRN
Status: DISCONTINUED | OUTPATIENT
Start: 2017-09-13 | End: 2017-09-15 | Stop reason: HOSPADM

## 2017-09-13 RX ORDER — MORPHINE SULFATE 4 MG/ML
4 INJECTION, SOLUTION INTRAMUSCULAR; INTRAVENOUS
Status: COMPLETED | OUTPATIENT
Start: 2017-09-13 | End: 2017-09-14

## 2017-09-13 RX ORDER — PROCHLORPERAZINE MALEATE 5 MG
5-10 TABLET ORAL EVERY 6 HOURS PRN
Status: DISCONTINUED | OUTPATIENT
Start: 2017-09-13 | End: 2017-09-15 | Stop reason: HOSPADM

## 2017-09-13 RX ORDER — SODIUM CHLORIDE 9 MG/ML
1000 INJECTION, SOLUTION INTRAVENOUS CONTINUOUS
Status: DISCONTINUED | OUTPATIENT
Start: 2017-09-13 | End: 2017-09-15 | Stop reason: HOSPADM

## 2017-09-13 RX ORDER — OXYCODONE HYDROCHLORIDE 5 MG/1
5-10 TABLET ORAL
Status: DISCONTINUED | OUTPATIENT
Start: 2017-09-13 | End: 2017-09-15 | Stop reason: HOSPADM

## 2017-09-13 RX ORDER — NALOXONE HYDROCHLORIDE 0.4 MG/ML
.1-.4 INJECTION, SOLUTION INTRAMUSCULAR; INTRAVENOUS; SUBCUTANEOUS
Status: DISCONTINUED | OUTPATIENT
Start: 2017-09-13 | End: 2017-09-15 | Stop reason: HOSPADM

## 2017-09-13 RX ORDER — ONDANSETRON 4 MG/1
4 TABLET, ORALLY DISINTEGRATING ORAL EVERY 6 HOURS PRN
Status: DISCONTINUED | OUTPATIENT
Start: 2017-09-13 | End: 2017-09-15 | Stop reason: HOSPADM

## 2017-09-13 RX ORDER — IOPAMIDOL 755 MG/ML
100 INJECTION, SOLUTION INTRAVASCULAR ONCE
Status: DISCONTINUED | OUTPATIENT
Start: 2017-09-13 | End: 2017-09-13

## 2017-09-13 RX ORDER — MORPHINE SULFATE 4 MG/ML
4 INJECTION, SOLUTION INTRAMUSCULAR; INTRAVENOUS
Status: COMPLETED | OUTPATIENT
Start: 2017-09-13 | End: 2017-09-13

## 2017-09-13 RX ADMIN — MORPHINE SULFATE 4 MG: 4 INJECTION, SOLUTION INTRAMUSCULAR; INTRAVENOUS at 18:40

## 2017-09-13 RX ADMIN — METOCLOPRAMIDE 10 MG: 5 INJECTION, SOLUTION INTRAMUSCULAR; INTRAVENOUS at 21:03

## 2017-09-13 RX ADMIN — MORPHINE SULFATE 4 MG: 4 INJECTION, SOLUTION INTRAMUSCULAR; INTRAVENOUS at 21:46

## 2017-09-13 RX ADMIN — PROCHLORPERAZINE EDISYLATE 10 MG: 5 INJECTION INTRAMUSCULAR; INTRAVENOUS at 21:46

## 2017-09-13 RX ADMIN — SODIUM CHLORIDE 60 ML: 9 INJECTION, SOLUTION INTRAVENOUS at 18:51

## 2017-09-13 RX ADMIN — ONDANSETRON 4 MG: 2 INJECTION INTRAMUSCULAR; INTRAVENOUS at 18:40

## 2017-09-13 RX ADMIN — ONDANSETRON 4 MG: 2 INJECTION INTRAMUSCULAR; INTRAVENOUS at 20:15

## 2017-09-13 RX ADMIN — SODIUM CHLORIDE 1000 ML: 9 INJECTION, SOLUTION INTRAVENOUS at 18:40

## 2017-09-13 RX ADMIN — TAMSULOSIN HYDROCHLORIDE 0.4 MG: 0.4 CAPSULE ORAL at 21:46

## 2017-09-13 RX ADMIN — HYDROMORPHONE HYDROCHLORIDE 1 MG: 1 INJECTION, SOLUTION INTRAMUSCULAR; INTRAVENOUS; SUBCUTANEOUS at 21:01

## 2017-09-13 RX ADMIN — SODIUM CHLORIDE 1000 ML: 9 INJECTION, SOLUTION INTRAVENOUS at 21:32

## 2017-09-13 RX ADMIN — IOPAMIDOL 100 ML: 755 INJECTION, SOLUTION INTRAVENOUS at 18:51

## 2017-09-13 RX ADMIN — ONDANSETRON 4 MG: 2 INJECTION INTRAMUSCULAR; INTRAVENOUS at 19:12

## 2017-09-13 RX ADMIN — KETOROLAC TROMETHAMINE 30 MG: 30 INJECTION, SOLUTION INTRAMUSCULAR at 20:15

## 2017-09-13 RX ADMIN — MORPHINE SULFATE 4 MG: 4 INJECTION, SOLUTION INTRAMUSCULAR; INTRAVENOUS at 19:13

## 2017-09-13 ASSESSMENT — ENCOUNTER SYMPTOMS
FREQUENCY: 0
ABDOMINAL PAIN: 1
CHILLS: 0
DYSURIA: 0
DIARRHEA: 0
BLOOD IN STOOL: 0
VOMITING: 0
DIFFICULTY URINATING: 1
FEVER: 0
HEMATURIA: 0
NAUSEA: 1

## 2017-09-13 NOTE — IP AVS SNAPSHOT
[facility]    [address] [facility] [address]    Phone:  [phone]    Fax:  [phone]                                       After Visit Summary   9/13/2017    Santo Toure    MRN: [id number]           After Visit Summary Signature Page     I have received my discharge instructions, and my questions have been answered. I have discussed any challenges I see with this plan with the nurse or doctor.    ..........................................................................................................................................  Patient/Patient Representative Signature      ..........................................................................................................................................  Patient Representative Print Name and Relationship to Patient    ..................................................               ................................................  Date                                            Time    ..........................................................................................................................................  Reviewed by Signature/Title    ...................................................              ..............................................  Date                                                            Time

## 2017-09-13 NOTE — NURSING NOTE
"Chief Complaint   Patient presents with     Flank Pain     lower right side        Initial /88  Pulse 76  Temp 97.7  F (36.5  C) (Tympanic)  Ht 5' 11\" (1.803 m)  Wt 216 lb 6.4 oz (98.2 kg)  SpO2 96%  BMI 30.18 kg/m2 Estimated body mass index is 30.18 kg/(m^2) as calculated from the following:    Height as of this encounter: 5' 11\" (1.803 m).    Weight as of this encounter: 216 lb 6.4 oz (98.2 kg).  Medication Reconciliation: complete    Health Maintenance that is potentially due pending provider review:  Lipid screening and tetanus     Will schedule future lipid screening appointment and tetanus was completed on 09/2017. David Barrett cma      Is there anyone who you would like to be able to receive your results? No  If yes have patient fill out [id number]      "

## 2017-09-13 NOTE — ED PROVIDER NOTES
History     Chief Complaint   Patient presents with     Abdominal Pain     pt accompanied by stephanie rowlandpeter.   pt was seen in [facility] and referred to ER due to right lower abdominal pain that started at 1000     HPI  Santo Toure is a 37 year old male who presents to the ED by private car for evaluation of RLQ abdominal pain. The following history was obtained through a . The patient developed gradual onset abdominal pain at approximately 10 am this morning while out walking his dog. It acutely worsened about 4 hours ago and the patient subsequently presented to the [facility] clinic. He was seen by Meche Espinal but was referred to the ED for further workup and evaluation.     Currently, the patient complains of 10/10 burning pain in the RLQ. He has never had this type of pain in the past. He treated with tylenol prior to arrival but only experienced temporary relief. Additional symptoms include difficultly urinating and difficulty having a BM. His last BM was last night. He denies diarrhea. He reports nausea but no vomiting. Denies fevers or chills. No recent contact with illness. He last ate a banana this morning with some water and milk. No prior abdominal surgeries. He does not take any daily medications. Reports social alcohol use. Non-smoker. There is no additional history to report.     I have reviewed the Medications, Allergies, Past Medical and Surgical History, and Social History in the Epic system.    There is no problem list on file for this patient.    Current Outpatient Prescriptions   Medication Sig Dispense Refill     [DISCONTINUED] albuterol (PROAIR HFA/PROVENTIL HFA/VENTOLIN HFA) 108 (90 BASE) MCG/ACT Inhaler Inhale 2 puffs into the lungs every 6 hours as needed for shortness of breath / dyspnea or wheezing (Patient not taking: Reported on 9/13/2017) 1 Inhaler 0     albuterol (PROAIR HFA/PROVENTIL HFA/VENTOLIN HFA) 108 (90 BASE) MCG/ACT Inhaler Inhale 2 puffs into the lungs  every 6 hours as needed for shortness of breath / dyspnea or wheezing (Patient not taking: Reported on 9/13/2017) 1 Inhaler 1     Allergies   Allergen Reactions     Penicillins Other (See Comments) and Rash     Vomiting as a infant       Review of Systems   Constitutional: Negative for chills and fever.   Gastrointestinal: Positive for abdominal pain (RLQ) and nausea. Negative for blood in stool, diarrhea and vomiting.        Difficulty having BM's   Genitourinary: Positive for difficulty urinating. Negative for decreased urine volume, dysuria, frequency, hematuria and urgency.   All other systems reviewed and are negative.      Physical Exam   BP: 139/85  Heart Rate: 64  Temp: 98.6  F (37  C)  Resp: 18  Weight: 97.5 kg (215 lb)  SpO2: 96 %  Physical Exam  BP (!) 164/100  Temp 98.6  F (37  C) (Temporal)  Resp 18  Wt 97.5 kg (215 lb)  SpO2 96%  BMI 29.99 kg/m2  General: alert, interactive, in moderate apparent distress upon arrival.  Laying in bed, and later developed episodes of vomiting.  Head: atraumatic  Nose: no rhinorrhea or epistaxis  Ears: no external auditory canal discharge or bleeding.    Eyes: Sclera nonicteric. Conjunctiva noninjected.    Mouth: no tonsillar erythema, edema, or exudate  Neck: supple, no palp LAD  Lungs: CTAB  CV: RRR, S1/S2; peripheral pulses palpable and symmetric  Abdomen: soft, tender in RLQ and right flank nd, no guarding or rebound. Positive bowel sounds  Extremities: no cyanosis or edema  Skin: no rash or diaphoresis  Neuro:  strength 5/5 in UE and LEs bilaterally,        ED Course     ED Course     Procedures             Critical Care time:  none               Labs Ordered and Resulted from Time of ED Arrival Up to the Time of Departure from the ED   URINE MACROSCOPIC WITH REFLEX TO MICRO - Abnormal; Notable for the following:        Result Value    Blood Urine Trace (*)     Protein Albumin Urine 10 (*)     Leukocyte Esterase Urine Large (*)     RBC Urine 6 (*)     WBC Urine  26 (*)     Bacteria Urine Few (*)     All other components within normal limits   CBC WITH PLATELETS DIFFERENTIAL - Abnormal; Notable for the following:     WBC 18.5 (*)     Absolute Neutrophil 15.0 (*)     All other components within normal limits   COMPREHENSIVE METABOLIC PANEL - Abnormal; Notable for the following:     Glucose 100 (*)     Urea Nitrogen 6 (*)     All other components within normal limits   PERIPHERAL IV CATHETER   URINE CULTURE AEROBIC BACTERIAL     Results for orders placed or performed during the hospital encounter of 09/13/17 (from the past 24 hour(s))   UA reflex to Microscopic   Result Value Ref Range    Color Urine Yellow     Appearance Urine Slightly Cloudy     Glucose Urine Negative NEG^Negative mg/dL    Bilirubin Urine Negative NEG^Negative    Ketones Urine Negative NEG^Negative mg/dL    Specific Gravity Urine 1.008 1.003 - 1.035    Blood Urine Trace (A) NEG^Negative    pH Urine 7.0 5.0 - 7.0 pH    Protein Albumin Urine 10 (A) NEG^Negative mg/dL    Urobilinogen mg/dL Normal 0.0 - 2.0 mg/dL    Nitrite Urine Negative NEG^Negative    Leukocyte Esterase Urine Large (A) NEG^Negative    Source Midstream Urine     RBC Urine 6 (H) 0 - 2 /HPF    WBC Urine 26 (H) 0 - 2 /HPF    Bacteria Urine Few (A) NEG^Negative /HPF    Squamous Epithelial /HPF Urine <1 0 - 1 /HPF   CBC with platelets differential   Result Value Ref Range    WBC 18.5 (H) 4.0 - 11.0 10e9/L    RBC Count 5.51 4.4 - 5.9 10e12/L    Hemoglobin 14.9 13.3 - 17.7 g/dL    Hematocrit 44.7 40.0 - 53.0 %    MCV 81 78 - 100 fl    MCH 27.0 26.5 - 33.0 pg    MCHC 33.3 31.5 - 36.5 g/dL    RDW 13.2 10.0 - 15.0 %    Platelet Count 181 150 - 450 10e9/L    Diff Method Automated Method     % Neutrophils 81.0 %    % Lymphocytes 11.1 %    % Monocytes 6.6 %    % Eosinophils 0.8 %    % Basophils 0.3 %    % Immature Granulocytes 0.2 %    Absolute Neutrophil 15.0 (H) 1.6 - 8.3 10e9/L    Absolute Lymphocytes 2.1 0.8 - 5.3 10e9/L    Absolute Monocytes 1.2 0.0 -  1.3 10e9/L    Absolute Eosinophils 0.1 0.0 - 0.7 10e9/L    Absolute Basophils 0.1 0.0 - 0.2 10e9/L    Abs Immature Granulocytes 0.0 0 - 0.4 10e9/L   Comprehensive metabolic panel   Result Value Ref Range    Sodium 140 133 - 144 mmol/L    Potassium 4.0 3.4 - 5.3 mmol/L    Chloride 105 94 - 109 mmol/L    Carbon Dioxide 28 20 - 32 mmol/L    Anion Gap 7 3 - 14 mmol/L    Glucose 100 (H) 70 - 99 mg/dL    Urea Nitrogen 6 (L) 7 - 30 mg/dL    Creatinine 0.74 0.66 - 1.25 mg/dL    GFR Estimate >90 >60 mL/min/1.7m2    GFR Estimate If Black >90 >60 mL/min/1.7m2    Calcium 9.3 8.5 - 10.1 mg/dL    Bilirubin Total 0.5 0.2 - 1.3 mg/dL    Albumin 4.0 3.4 - 5.0 g/dL    Protein Total 7.5 6.8 - 8.8 g/dL    Alkaline Phosphatase 87 40 - 150 U/L    ALT 27 0 - 70 U/L    AST 12 0 - 45 U/L   CT Abdomen Pelvis w Contrast    Narrative    CT ABDOMEN AND PELVIS WITH CONTRAST 9/13/2017 6:54 PM     HISTORY: RLQ abdominal pain.    TECHNIQUE: Axial images from the lung bases to the symphysis are  performed with additional coronal reformatted images. 100 mL of Isovue  370 are given intravenously.  Radiation dose for this scan was reduced  using automated exposure control, adjustment of the mA and/or kV  according to patient size, or iterative reconstruction technique.    FINDINGS:     Calcified granuloma is noted within the right lower lobe. Lung bases  are otherwise clear.    Abdomen: The liver, spleen, gallbladder, pancreas and adrenal glands  are within normal limits. Bilateral renal cortical cysts are present.  There is slight delayed nephrogram right kidney with areas of  decreased cortical enhancement compared to the contralateral left  kidney concerning for pyelonephritis. There is mild right  hydronephrosis due to an obstructing 0.3 cm proximal right ureteral  stone on series 2, image 54. No left urinary tract calculi. No  enlarged abdominal lymph nodes. The bowel is normal in caliber without  obstruction, diverticulitis or  appendicitis.    Pelvis: The bladder, prostate and rectum are unremarkable. No enlarged  pelvic lymph nodes or free fluid. Bone window examination is within  normal limits.      Impression    IMPRESSION:  1. Obstructing proximal right ureteral stone measuring 0.3 cm. Delayed  right nephrogram, hydronephrosis and possible right pyelonephritis  changes are noted. No left urinary tract calculi.  2. No other acute changes are evident in the abdomen or pelvis to  account for patient's symptoms.  3. Evidence of old granulomatous disease.    MASSIMO WU MD       Medications   lidocaine 1 % 1 mL (not administered)   lidocaine (LMX4) kit (not administered)   sodium chloride (PF) 0.9% PF flush 3 mL (not administered)   sodium chloride (PF) 0.9% PF flush 3 mL (3 mLs Intracatheter Given 9/13/17 1841)   0.9% sodium chloride BOLUS (0 mLs Intravenous Stopped 9/13/17 2000)     Followed by   0.9% sodium chloride infusion (not administered)   morphine (PF) injection 4 mg (4 mg Intravenous Given 9/13/17 1913)   ondansetron (ZOFRAN) injection 4 mg (not administered)   metoclopramide (REGLAN) injection 10 mg (not administered)   morphine (PF) injection 4 mg (4 mg Intravenous Given 9/13/17 1840)   ondansetron (ZOFRAN) injection 4 mg (4 mg Intravenous Given 9/13/17 2015)   sodium chloride 0.9 % bag 500mL for CT scan flush use (60 mLs As instructed Given 9/13/17 1851)   ketorolac (TORADOL) injection 30 mg (30 mg Intravenous Given 9/13/17 2015)   HYDROmorphone (DILAUDID) injection 1 mg (1 mg Intravenous Given 9/13/17 2101)       5:55 PM Patient Assessed     Assessments & Plan (with Medical Decision Making)  37 year old male , with past medical history significant for deafness, presenting the emergency Department with complaints of severe right-sided flank and right lower quadrant abdominal pain that began at approximately 10 AM.  History obtained with the use of .    I reviewed the nurse practitioner note from  [facility].  Concern was regarding the severe right lower quadrant abdominal pain in patient with no previous surgical history, with nausea, and therefore patient was sent to the emergency department for further evaluation.  Differential includes appendicitis, diverticulitis, UTI, pyelonephritis, ureteral stone.    Laboratory workup performed, and urinalysis did have 26 white blood cells, in addition to 6 red blood cells and large leukocyte esterase.  CMP is normal.  CBC does have elevated white blood cell count at 18.5.  CT scan of the abdomen/pelvis is performed with IV contrast given the concern for possible appendicitis.  However, obstructing right ureteral stone measuring 3 mm is found.  There is hydronephrosis with possible right-sided pyelonephritis changes.  I called and had discussion with urologist at the [facility], Dr. Connolly.  Concerns are regarding the patient's serum white blood cell count at 18.5.  Patient has been afebrile.  No other signs of systemic severe illness.  If the serum white blood cell count was not as high, recommendations would be for patient to go home.  However, given the elevation of white blood cell count, recommendation is for hospital observation, with recheck of white blood cell count.  I discussed antibiotics with the urologist, who recommended not to give antibiotics at this point.  However, if patient were to spike a fever overnight, or develop increased further white blood cell count tomorrow morning, may require consideration for urologic procedure overnight or tomorrow.    We checked the schedule for urologists.  Dr. Lundy is in clinic in [place] tomorrow, and therefore I had discussion with hospitalist, who agreed to accept patient in admission to the hospital.  Dr. Bonilla also evaluated the patient in the emergency department.  He has received morphine, Zofran, Toradol, and has recently received Reglan and Dilaudid as patient had  increased amounts of nausea throughout his ED course, with vomiting in the ED.  Difficult to control pain.       I have reviewed the nursing notes.    I have reviewed the findings, diagnosis, plan and need for follow up with the patient.       ED to Inpatient Handoff:    Discussed with Dr. Bonilla  Pending studies include none  Code Status: Full Code           New Prescriptions    No medications on file       Final diagnoses:   Calculi, ureter     This document serves as a record of the services and decisions personally performed and made by Pernell Barger MD. It was created on HIS/HER behalf by Everett Leyva, a trained medical scribe. The creation of this document is based the provider's statements to the medical scribe.  Everett Leyva 5:55 PM 9/13/2017    Provider:   The information in this document, created by the medical scribe for me, accurately reflects the services I personally performed and the decisions made by me. I have reviewed and approved this document for accuracy prior to leaving the patient care area.  Pernell Barger MD 5:55 PM 9/13/2017 9/13/2017   [facility] EMERGENCY DEPARTMENT     Pernell Barger MD  09/13/17 [id number]

## 2017-09-13 NOTE — LETTER
SURGERYPLANNING/SCHEDULING WORKSHEET                              [facility] [place]  [address] [address] [phone]                          Santo Toure                :  1980  MRN:  [id number]  Phone: [phone] (home)    Same Day Surgery [phone]   Surgeon: No name on file.  Diagnosis:   Right ureteral stone  Allergies:  Penicillins   {Preop Eval already done  ====================================================  Surgical Procedure: cystoscopy, right ureteroscopy, laser lithotripsy, stent exchange  Length of Procedure:  60 min  Type of anesthesia:  General  The proposed surgical procedure is considered LOW risk.  Date of Procedure:  Oct 5 2017   Time:   approx  3:00pm  Informed Consent Obtained and Signed:  NO  ====================================================  Instructions to Same Day Surgery Staff  none  Special Equipment: c-Arm, holmium laser  Preop Antibiotic:  Clindamycin 600 mg IV  plus Gentamycin 1.5mg/kg IV (if penicillin allergic), pre-op within 1 hr prior to incision Infuse over 20 mins.  Preop Pain Meds:  None  PreOp Orders:  Routine Standing Orders.  ====================================================  Instructions to the patient:  Preop physical exam scheduled (within 30 days or 7 days prior) with:  Dr. RAJAN   ON ADMISSION TO HOSPITAL   Clinic:  ____________________                                         Date______________Time_________________________  Come to the hospital at:  2:00PM  HOME PREPARATION:   Shower with Hibiclens the night before and the morning of surgery, gently cleaning skin from neck to feet  Bathe and brush teeth the morning of surgery.  Take medications with a sip of water the morning of surgery:   Check with  if taking insulin.  May have  a light meal, toast and clear liquids, up to 6 hrs before surgery  May have clear liquids (liquids one can read through) up to 2 hrs  before surgery  NOTHING after 2 hrs before surgery  Stop aspirin 7-10 days before surgery  Stop NSAIDS (Ibuproven, Naproxen, etc) 5 days before surgery  Stop Plavix 7-10 days before surgery      No name on file.    9/14/2017

## 2017-09-13 NOTE — MR AVS SNAPSHOT
"              After Visit Summary   9/13/2017    Santo Toure    MRN: [id number]           Patient Information     Date Of Birth          1980        Visit Information        Provider Department      9/13/2017 4:20 PM Meche Espinal APRN CNP; [facility]        Today's Diagnoses     RLQ abdominal pain    -  1      Care Instructions    Go directly to the [facility] ER for further evaluation.              Follow-ups after your visit        Who to contact     If you have questions or need follow up information about today's clinic visit or your schedule please contact [facility] directly at [phone].  Normal or non-critical lab and imaging results will be communicated to you by Contattahart, letter or phone within 4 business days after the clinic has received the results. If you do not hear from us within 7 days, please contact the clinic through Contattahart or phone. If you have a critical or abnormal lab result, we will notify you by phone as soon as possible.  Submit refill requests through [employer] or call your pharmacy and they will forward the refill request to us. Please allow 3 business days for your refill to be completed.          Additional Information About Your Visit        MyChart Information     [employer] gives you secure access to your electronic health record. If you see a primary care provider, you can also send messages to your care team and make appointments. If you have questions, please call your primary care clinic.  If you do not have a primary care provider, please call [phone] and they will assist you.        Care EveryWhere ID     This is your Care EveryWhere ID. This could be used by other organizations to access your [place] medical records  [id number]        Your Vitals Were     Pulse Temperature Height Pulse Oximetry BMI (Body Mass Index)       76 97.7  F (36.5  C) (Tympanic) 5' 11\" (1.803 m) 96% 30.18 kg/m2        " Blood Pressure from Last 3 Encounters:   09/13/17 138/88   08/03/17 127/74   07/28/17 (!) 153/102    Weight from Last 3 Encounters:   09/13/17 216 lb 6.4 oz (98.2 kg)   08/03/17 230 lb (104.3 kg)   07/28/17 230 lb (104.3 kg)              Today, you had the following     No orders found for display       Primary Care Provider    Unknown Primary MD Willie       No address on file        Equal Access to Services     [facility]: Hadii aad ku hadasho Soomaali, waaxda luqadaha, qaybta kaalmada adeegyada, waxay idiin hayaan adeeg kharash laharika . So [facility] [phone].    ATENCIÓN: Si habla español, tiene a marshall disposición servicios gratuitos de asistencia lingüística. Ll[facility] [phone].    We comply with applicable federal civil rights laws and [place] laws. We do not discriminate on the basis of race, color, national origin, age, disability sex, sexual orientation or gender identity.            Thank you!     Thank you for choosing [facility]  for your care. Our goal is always to provide you with excellent care. Hearing back from our patients is one way we can continue to improve our services. Please take a few minutes to complete the written survey that you may receive in the mail after your visit with us. Thank you!             Your Updated Medication List - Protect others around you: Learn how to safely use, store and throw away your medicines at www.disposemymeds.org.          This list is accurate as of: 9/13/17  4:50 PM.  Always use your most recent med list.                   Brand Name Dispense Instructions for use Diagnosis    * albuterol 108 (90 BASE) MCG/ACT Inhaler    PROAIR HFA/PROVENTIL HFA/VENTOLIN HFA    1 Inhaler    Inhale 2 puffs into the lungs every 6 hours as needed for shortness of breath / dyspnea or wheezing        * albuterol 108 (90 BASE) MCG/ACT Inhaler    PROAIR HFA/PROVENTIL HFA/VENTOLIN HFA    1 Inhaler    Inhale 2 puffs into the lungs every 6 hours as needed for  shortness of breath / dyspnea or wheezing    Acute bronchitis with symptoms > 10 days       menthol 7 MG Lozg    COUGH DROP     Take 1 lozenge by mouth every hour as needed for cough        oxyCODONE-acetaminophen 5-325 MG per tablet    PERCOCET    10 tablet    Take 1-2 tablets by mouth every 4 hours as needed for pain maximum 8 tablet(s) per day        predniSONE 20 MG tablet    DELTASONE    10 tablet    Take one tablet twice a day for 5 days.    Acute bronchitis with symptoms > 10 days       * Notice:  This list has 2 medication(s) that are the same as other medications prescribed for you. Read the directions carefully, and ask your doctor or other care provider to review them with you.

## 2017-09-13 NOTE — PROGRESS NOTES
SUBJECTIVE:   Santo Toure is a 37 year old male who presents to clinic today for the following health issues:      Musculoskeletal problem/pain      Duration: this morning at 10 am     Description  Location: right side     Intensity:  severe    Accompanying signs and symptoms: sharp and throbbing- couldn't take a nap because the pain is so severe. Feels like it might be a bit swollen and hard to palpate     History  Previous similar problem: no   Previous evaluation:  none    Precipitating or alleviating factors:  Trauma or overuse: no   Aggravating factors include: laying in bed and sitting too long     Therapies tried and outcome: took a bath- felt good for 10-15 minutes then pain came back       Abdominal pain (RLQ) started suddenly at 10 am today.  No vomiting but has tried to see if he would feel better. No diarrhea, constipation or fever. Still has appendix. Deann KINGSTON From [facility] interpreting service is here with patient and his wife. Wife also signs. Pain has been getting worse as day goes on.      Problem list and histories reviewed & adjusted, as indicated.  Additional history: as documented    There is no problem list on file for this patient.    History reviewed. No pertinent surgical history.    Social History   Substance Use Topics     Smoking status: Never Smoker     Smokeless tobacco: Never Used     Alcohol use Yes      Comment: on occasion     History reviewed. No pertinent family history.      Current Outpatient Prescriptions   Medication Sig Dispense Refill     oxyCODONE-acetaminophen (PERCOCET) 5-325 MG per tablet Take 1-2 tablets by mouth every 4 hours as needed for pain maximum 8 tablet(s) per day (Patient not taking: Reported on 9/13/2017) 10 tablet 0     predniSONE (DELTASONE) 20 MG tablet Take one tablet twice a day for 5 days. (Patient not taking: Reported on 9/13/2017) 10 tablet 0     albuterol (PROAIR HFA/PROVENTIL HFA/VENTOLIN HFA) 108 (90 BASE) MCG/ACT Inhaler Inhale 2 puffs into the  "lungs every 6 hours as needed for shortness of breath / dyspnea or wheezing (Patient not taking: Reported on 9/13/2017) 1 Inhaler 0     menthol (COUGH DROP) 7 MG LOZG Take 1 lozenge by mouth every hour as needed for cough       albuterol (PROAIR HFA/PROVENTIL HFA/VENTOLIN HFA) 108 (90 BASE) MCG/ACT Inhaler Inhale 2 puffs into the lungs every 6 hours as needed for shortness of breath / dyspnea or wheezing (Patient not taking: Reported on 9/13/2017) 1 Inhaler 1     Allergies   Allergen Reactions     Penicillins Other (See Comments) and Rash     Vomiting as a infant     Labs reviewed in EPIC      Reviewed and updated as needed this visit by clinical staffTobacco  Allergies  Meds  Med Hx  Surg Hx  Fam Hx  Soc Hx      Reviewed and updated as needed this visit by Provider  Allergies         ROS:  Constitutional, HEENT, cardiovascular, pulmonary, GI, , musculoskeletal, neuro, skin, endocrine and psych systems are negative, except as otherwise noted.      OBJECTIVE:   /88  Pulse 76  Temp 97.7  F (36.5  C) (Tympanic)  Ht 5' 11\" (1.803 m)  Wt 216 lb 6.4 oz (98.2 kg)  SpO2 96%  BMI 30.18 kg/m2  Body mass index is 30.18 kg/(m^2).   GENERAL: healthy, alert and no distress, nontoxic in appearance, understands well with sign language interpretor.   EYES: Eyes grossly normal to inspection, PERRL and conjunctivae and sclerae normal  HENT: ear canals and TM's normal, nose and mouth without ulcers or lesions  NECK: no adenopathy, supple with full ROM  RESP: lungs clear to auscultation - no rales, rhonchi or wheezes  CV: regular rate and rhythm, normal S1 S2, no S3 or S4, no murmur, click or rub, no peripheral edema   ABDOMEN: soft, RLQ pain with palpation, mild rebound tenderness and mild guarding, no masses or megaly, hyperactive bowel sounds  MS: no gross musculoskeletal defects noted, no edema  No rash    Diagnostic Test Results:  No results found for this or any previous visit (from the past 24 " hour(s)).    ASSESSMENT/PLAN:   Spoke with Pura RN, in [place] ER and we will send him there for further evaluation. I am not starting a workup here as he will need more labs once he gets to ER.    Problem List Items Addressed This Visit     None      Visit Diagnoses     RLQ abdominal pain    -  Primary    Health care maintenance        Relevant Orders    Lipid Profile (Chol, Trig, HDL, LDL calc)               Patient Instructions   Go directly to the [facility] for further evaluation.          ROSSY Diego CNP  [facility]

## 2017-09-13 NOTE — IP AVS SNAPSHOT
MRN:[id number]                      After Visit Summary   9/13/2017    Santo Toure    MRN: [id number]           Thank you!     Thank you for choosing [facility] for your care. Our goal is always to provide you with excellent care. Hearing back from our patients is one way we can continue to improve our services. Please take a few minutes to complete the written survey that you may receive in the mail after you visit with us. Thank you!        Patient Information     Date Of Birth          1980        About your hospital stay     You were admitted on:  September 13, 2017 You last received care in the:  [facility]    You were discharged on:  September 15, 2017        Reason for your hospital stay       Renal Colic due to Ureteric stone with Urinary Tract infection                  Who to Call     For medical emergencies, please call 911.  For non-urgent questions about your medical care, please call your primary care provider or clinic, None  For questions related to your surgery, please call your surgery clinic        Attending Provider     Provider Specialty    Pernell Barger MD Emergency Medicine    Edison Bonilla MD Family Practice    Bebo Rene MD Internal Medicine       Primary Care Provider    Unknown Primary MD Willie       When to contact your care team       Call your primary doctor if you have any of the following: temperature greater than 100 F or less than 95 F, increased abdominal pain or Nausea with Vomiting.                  After Care Instructions     Activity       Your activity upon discharge: activity as tolerated            Diet       Follow this diet upon discharge: Orders Placed This Encounter  Regular Diet Adult - Please drink about 2 liters of fluids or more per day                  Follow-up Appointments     Follow-up and recommended labs and tests        Follow up with primary care provider, Unknown Primary , within 7 days for  hospital follow- up.  The following labs/tests are recommended: CBC with Pyelonephritis.                  Your next 10 appointments already scheduled     Sep 20, 2017  8:20 AM CDT   SHORT with Charbel Cloud PA-C   [facility] ([facility])    [address] [place] [id number]   [phone]              Pending Results     No orders found from 9/11/2017 to 9/14/2017.            Statement of Approval     Ordered          09/15/17 1024  I have reviewed and agree with all the recommendations and orders detailed in this document.  EFFECTIVE NOW     Approved and electronically signed by:  Bebo Rene MD             Admission Information     Date & Time Provider Department Dept. Phone    9/13/2017 Bebo Rene MD [facility] [phone]      Your Vitals Were     Blood Pressure Pulse Temperature Respirations Height Weight    117/72 (BP Location: Right arm) 69 97.7  F (36.5  C) (Oral) 18 1.829 m (6') 98.5 kg (217 lb 2.5 oz)    Pulse Oximetry BMI (Body Mass Index)                96% 29.45 kg/m2          [employer] Information     [facility] gives you secure access to your electronic health record. If you see a primary care provider, you can also send messages to your care team and make appointments. If you have questions, please call your primary care clinic.  If you do not have a primary care provider, please call [phone] and they will assist you.        Care EveryWhere ID     This is your Care EveryWhere ID. This could be used by other organizations to access your [facility] medical records  [id number]        Equal Access to Services     ANTONINA ALBA : Hadmao macario Sovalentin, waaxda luqadaha, qaybta kaalmada juaquin, mookie rich. So [facility] [phone].    ATENCIÓN: Si habla español, tiene a marshall disposición servicios gratuitos de asistencia lingüística. Llame al [phone].    We comply with applicable federal  civil rights laws and [place] laws. We do not discriminate on the basis of race, color, national origin, age, disability sex, sexual orientation or gender identity.               Review of your medicines      START taking        Dose / Directions    acetaminophen 500 MG tablet   Commonly known as:  TYLENOL   Used for:  Calculi, ureter        Dose:  1000 mg   Take 2 tablets (1,000 mg) by mouth every 6 hours as needed for mild pain or fever   Quantity:  1 Bottle   Refills:  0       ciprofloxacin 500 MG tablet   Commonly known as:  CIPRO   Used for:  Acute pyelonephritis        Dose:  500 mg   Take 1 tablet (500 mg) by mouth 2 times daily for 9 days   Quantity:  18 tablet   Refills:  0       [facility] DIGESTIVE HEALTH Caps   Used for:  Acute pyelonephritis        Dose:  1 tablet   Take 1 tablet by mouth 2 times daily for 10 days   Quantity:  20 capsule   Refills:  0       ondansetron 4 MG ODT tab   Commonly known as:  ZOFRAN-ODT   Used for:  Calculi, ureter        Dose:  4 mg   Take 1 tablet (4 mg) by mouth every 6 hours as needed for nausea or vomiting   Quantity:  15 tablet   Refills:  0       oxyCODONE 5 MG IR tablet   Commonly known as:  ROXICODONE   Used for:  Calculi, ureter        Dose:  5-10 mg   Take 1-2 tablets (5-10 mg) by mouth every 3 hours as needed for moderate to severe pain   Quantity:  15 tablet   Refills:  0         CONTINUE these medicines which have NOT CHANGED        Dose / Directions    albuterol 108 (90 BASE) MCG/ACT Inhaler   Commonly known as:  PROAIR HFA/PROVENTIL HFA/VENTOLIN HFA        Dose:  2 puff   Inhale 2 puffs into the lungs every 6 hours as needed for shortness of breath / dyspnea or wheezing   Quantity:  1 Inhaler   Refills:  1            Where to get your medicines      Some of these will need a paper prescription and others can be bought over the counter. Ask your nurse if you have questions.     Bring a paper prescription for each of these medications     acetaminophen 500 MG  tablet    ciprofloxacin 500 MG tablet    [facility] DIGESTIVE HEALTH Caps    ondansetron 4 MG ODT tab    oxyCODONE 5 MG IR tablet                Protect others around you: Learn how to safely use, store and throw away your medicines at www.disposemymeds.org.             Medication List: This is a list of all your medications and when to take them. Check marks below indicate your daily home schedule. Keep this list as a reference.      Medications           Morning Afternoon Evening Bedtime As Needed    acetaminophen 500 MG tablet   Commonly known as:  TYLENOL   Take 2 tablets (1,000 mg) by mouth every 6 hours as needed for mild pain or fever   Last time this was given:  1,000 mg on 9/15/2017  6:04 AM                                   albuterol 108 (90 BASE) MCG/ACT Inhaler   Commonly known as:  PROAIR HFA/PROVENTIL HFA/VENTOLIN HFA   Inhale 2 puffs into the lungs every 6 hours as needed for shortness of breath / dyspnea or wheezing                                   ciprofloxacin 500 MG tablet   Commonly known as:  CIPRO   Take 1 tablet (500 mg) by mouth 2 times daily for 9 days                                      [facility] DIGESTIVE HEALTH Caps   Take 1 tablet by mouth 2 times daily for 10 days                                      ondansetron 4 MG ODT tab   Commonly known as:  ZOFRAN-ODT   Take 1 tablet (4 mg) by mouth every 6 hours as needed for nausea or vomiting                                   oxyCODONE 5 MG IR tablet   Commonly known as:  ROXICODONE   Take 1-2 tablets (5-10 mg) by mouth every 3 hours as needed for moderate to severe pain                                             More Information        Tamsulosin Hydrochloride Oral capsule  What is this medicine?  TAMSULOSIN (salas AIRAM gena sin) is used to treat enlargement of the prostate gland in men, a condition called benign prostatic hyperplasia or BPH. It is not for use in women. It works by relaxing muscles in the prostate and bladder neck. This  improves urine flow and reduces BPH symptoms.  This medicine may be used for other purposes; ask your health care provider or pharmacist if you have questions.  What should I tell my health care provider before I take this medicine?  They need to know if you have any of the following conditions:    advanced kidney disease    advanced liver disease    low blood pressure    prostate cancer    an unusual or allergic reaction to tamsulosin, sulfa drugs, other medicines, foods, dyes, or preservatives    pregnant or trying to get pregnant    breast-feeding  How should I use this medicine?  Take this medicine by mouth about 30 minutes after the same meal every day. Follow the directions on the prescription label. Swallow the capsules whole with a glass of water. Do not crush, chew, or open capsules. Do not take your medicine more often than directed. Do not stop taking your medicine unless your doctor tells you to.  Talk to your pediatrician regarding the use of this medicine in children. Special care may be needed.  Overdosage: If you think you have taken too much of this medicine contact a poison control center or emergency room at once.  NOTE: This medicine is only for you. Do not share this medicine with others.  What if I miss a dose?  If you miss a dose, take it as soon as you can. If it is almost time for your next dose, take only that dose. Do not take double or extra doses. If you stop taking your medicine for several days or more, ask your doctor or health care professional what dose you should start back on.  What may interact with this medicine?    cimetidine    fluoxetine    ketoconazole    medicines for erectile disfunction like sildenafil, tadalafil, vardenafil    medicines for high blood pressure    other alpha-blockers like alfuzosin, doxazosin, phentolamine, phenoxybenzamine, prazosin, terazosin    warfarin  This list may not describe all possible interactions. Give your health care provider a list of all  the medicines, herbs, non-prescription drugs, or dietary supplements you use. Also tell them if you smoke, drink alcohol, or use illegal drugs. Some items may interact with your medicine.  What should I watch for while using this medicine?  Visit your doctor or health care professional for regular check ups. You will need lab work done before you start this medicine and regularly while you are taking it. Check your blood pressure as directed. Ask your health care professional what your blood pressure should be, and when you should contact him or her.  This medicine may make you feel dizzy or lightheaded. This is more likely to happen after the first dose, after an increase in dose, or during hot weather or exercise. Drinking alcohol and taking some medicines can make this worse. Do not drive, use machinery, or do anything that needs mental alertness until you know how this medicine affects you. Do not sit or stand up quickly. If you begin to feel dizzy, sit down until you feel better. These effects can decrease once your body adjusts to the medicine.  Contact your doctor or health care professional right away if you have an erection that lasts longer than 4 hours or if it becomes painful. This may be a sign of a serious problem and must be treated right away to prevent permanent damage.  If you are thinking of having cataract surgery, tell your eye surgeon that you have taken this medicine.  What side effects may I notice from receiving this medicine?  Side effects that you should report to your doctor or health care professional as soon as possible:    allergic reactions like skin rash or itching, hives, swelling of the lips, mouth, tongue, or throat    breathing problems    change in vision    feeling faint or lightheaded    irregular heartbeat    prolonged or painful erection    weakness  Side effects that usually do not require medical attention (report to your doctor or health care professional if they continue  or are bothersome):    back pain    change in sex drive or performance    constipation, nausea or vomiting    cough    drowsy    runny or stuffy nose    trouble sleeping  This list may not describe all possible side effects. Call your doctor for medical advice about side effects. You may report side effects to FDA at 3-576-EYJ-6318.  Where should I keep my medicine?  Keep out of the reach of children.  Store at room temperature between 15 and 30 degrees C (59 and 86 degrees F). Throw away any unused medicine after the expiration date.  NOTE:This sheet is a summary. It may not cover all possible information. If you have questions about this medicine, talk to your doctor, pharmacist, or health care provider. Copyright  2016 Gold Standard

## 2017-09-13 NOTE — LETTER
[facility]  [address] [facility] [address]  [phone]          September 15, 2017    RE:  Santo Toure                                                                                                                                                       [address] [address] [address]            To whom it may concern:    Santo Toure was admitted to [facility] under my professional care from 09/13/2017 to 9/15/2017    He  may return to work 9/18/2017          Sincerely,      Bebo SAVAGE.  Hospitalist [facility]

## 2017-09-14 ENCOUNTER — ANESTHESIA EVENT (OUTPATIENT)
Dept: SURGERY | Facility: CLINIC | Age: 37
End: 2017-09-14
Payer: COMMERCIAL

## 2017-09-14 ENCOUNTER — ANESTHESIA (OUTPATIENT)
Dept: SURGERY | Facility: CLINIC | Age: 37
End: 2017-09-14
Payer: COMMERCIAL

## 2017-09-14 ENCOUNTER — APPOINTMENT (OUTPATIENT)
Dept: GENERAL RADIOLOGY | Facility: CLINIC | Age: 37
End: 2017-09-14
Attending: UROLOGY
Payer: COMMERCIAL

## 2017-09-14 LAB
ANION GAP SERPL CALCULATED.3IONS-SCNC: 6 MMOL/L (ref 3–14)
BUN SERPL-MCNC: 7 MG/DL (ref 7–30)
CALCIUM SERPL-MCNC: 8.3 MG/DL (ref 8.5–10.1)
CHLORIDE SERPL-SCNC: 108 MMOL/L (ref 94–109)
CO2 SERPL-SCNC: 28 MMOL/L (ref 20–32)
CREAT SERPL-MCNC: 0.76 MG/DL (ref 0.66–1.25)
ERYTHROCYTE [DISTWIDTH] IN BLOOD BY AUTOMATED COUNT: 13.4 % (ref 10–15)
GFR SERPL CREATININE-BSD FRML MDRD: >90 ML/MIN/1.7M2
GLUCOSE SERPL-MCNC: 121 MG/DL (ref 70–99)
HCT VFR BLD AUTO: 40.3 % (ref 40–53)
HGB BLD-MCNC: 13.2 G/DL (ref 13.3–17.7)
MCH RBC QN AUTO: 27 PG (ref 26.5–33)
MCHC RBC AUTO-ENTMCNC: 32.8 G/DL (ref 31.5–36.5)
MCV RBC AUTO: 82 FL (ref 78–100)
PLATELET # BLD AUTO: 170 10E9/L (ref 150–450)
POTASSIUM SERPL-SCNC: 4 MMOL/L (ref 3.4–5.3)
RBC # BLD AUTO: 4.89 10E12/L (ref 4.4–5.9)
SODIUM SERPL-SCNC: 142 MMOL/L (ref 133–144)
WBC # BLD AUTO: 16.4 10E9/L (ref 4–11)

## 2017-09-14 PROCEDURE — 80048 BASIC METABOLIC PNL TOTAL CA: CPT | Performed by: FAMILY MEDICINE

## 2017-09-14 PROCEDURE — 36000054 ZZH SURGERY LEVEL 2 W FLUORO 1ST 30 MIN: Performed by: UROLOGY

## 2017-09-14 PROCEDURE — 25000128 H RX IP 250 OP 636: Performed by: EMERGENCY MEDICINE

## 2017-09-14 PROCEDURE — 85027 COMPLETE CBC AUTOMATED: CPT | Performed by: FAMILY MEDICINE

## 2017-09-14 PROCEDURE — 37000009 ZZH ANESTHESIA TECHNICAL FEE, EACH ADDTL 15 MIN: Performed by: UROLOGY

## 2017-09-14 PROCEDURE — 25000128 H RX IP 250 OP 636: Performed by: FAMILY MEDICINE

## 2017-09-14 PROCEDURE — 36000052 ZZH SURGERY LEVEL 2 EA 15 ADDTL MIN: Performed by: UROLOGY

## 2017-09-14 PROCEDURE — G0378 HOSPITAL OBSERVATION PER HR: HCPCS

## 2017-09-14 PROCEDURE — 25000132 ZZH RX MED GY IP 250 OP 250 PS 637: Performed by: FAMILY MEDICINE

## 2017-09-14 PROCEDURE — 27210794 ZZH OR GENERAL SUPPLY STERILE: Performed by: UROLOGY

## 2017-09-14 PROCEDURE — 25000125 ZZHC RX 250: Performed by: NURSE ANESTHETIST, CERTIFIED REGISTERED

## 2017-09-14 PROCEDURE — 25000128 H RX IP 250 OP 636: Performed by: UROLOGY

## 2017-09-14 PROCEDURE — 37000008 ZZH ANESTHESIA TECHNICAL FEE, 1ST 30 MIN: Performed by: UROLOGY

## 2017-09-14 PROCEDURE — 25000128 H RX IP 250 OP 636: Performed by: INTERNAL MEDICINE

## 2017-09-14 PROCEDURE — 52332 CYSTOSCOPY AND TREATMENT: CPT | Mod: RT | Performed by: UROLOGY

## 2017-09-14 PROCEDURE — 25000128 H RX IP 250 OP 636: Performed by: NURSE ANESTHETIST, CERTIFIED REGISTERED

## 2017-09-14 PROCEDURE — 40000277 XR SURGERY CARM FLUORO LESS THAN 5 MIN W STILLS

## 2017-09-14 PROCEDURE — 99225 ZZC SUBSEQUENT OBSERVATION CARE,LEVEL II: CPT | Performed by: INTERNAL MEDICINE

## 2017-09-14 PROCEDURE — S0077 INJECTION, CLINDAMYCIN PHOSP: HCPCS | Performed by: NURSE ANESTHETIST, CERTIFIED REGISTERED

## 2017-09-14 PROCEDURE — C1769 GUIDE WIRE: HCPCS | Performed by: UROLOGY

## 2017-09-14 PROCEDURE — 36415 COLL VENOUS BLD VENIPUNCTURE: CPT | Performed by: FAMILY MEDICINE

## 2017-09-14 PROCEDURE — 71000012 ZZH RECOVERY PHASE 1 LEVEL 1 FIRST HR: Performed by: UROLOGY

## 2017-09-14 PROCEDURE — 40000305 ZZH STATISTIC PRE PROC ASSESS I: Performed by: UROLOGY

## 2017-09-14 PROCEDURE — C2617 STENT, NON-COR, TEM W/O DEL: HCPCS | Performed by: UROLOGY

## 2017-09-14 PROCEDURE — 99218 ZZC INITIAL OBSERVATION CARE,LEVL I: CPT | Mod: 25 | Performed by: UROLOGY

## 2017-09-14 PROCEDURE — 96376 TX/PRO/DX INJ SAME DRUG ADON: CPT

## 2017-09-14 PROCEDURE — 96365 THER/PROPH/DIAG IV INF INIT: CPT

## 2017-09-14 DEVICE — STENT URETERAL DBL PIGTAIL INLAY 6FRX26CM 778626
Type: IMPLANTABLE DEVICE | Site: URETER | Status: NON-FUNCTIONAL
Removed: 2017-10-05

## 2017-09-14 RX ORDER — MEPERIDINE HYDROCHLORIDE 25 MG/ML
12.5 INJECTION INTRAMUSCULAR; INTRAVENOUS; SUBCUTANEOUS
Status: DISCONTINUED | OUTPATIENT
Start: 2017-09-14 | End: 2017-09-14 | Stop reason: HOSPADM

## 2017-09-14 RX ORDER — NALOXONE HYDROCHLORIDE 0.4 MG/ML
.1-.4 INJECTION, SOLUTION INTRAMUSCULAR; INTRAVENOUS; SUBCUTANEOUS
Status: DISCONTINUED | OUTPATIENT
Start: 2017-09-14 | End: 2017-09-14 | Stop reason: HOSPADM

## 2017-09-14 RX ORDER — PROPOFOL 10 MG/ML
INJECTION, EMULSION INTRAVENOUS CONTINUOUS PRN
Status: DISCONTINUED | OUTPATIENT
Start: 2017-09-14 | End: 2017-09-14

## 2017-09-14 RX ORDER — ONDANSETRON 2 MG/ML
4 INJECTION INTRAMUSCULAR; INTRAVENOUS EVERY 30 MIN PRN
Status: DISCONTINUED | OUTPATIENT
Start: 2017-09-14 | End: 2017-09-14 | Stop reason: HOSPADM

## 2017-09-14 RX ORDER — LIDOCAINE 40 MG/G
CREAM TOPICAL
Status: DISCONTINUED | OUTPATIENT
Start: 2017-09-14 | End: 2017-09-14 | Stop reason: HOSPADM

## 2017-09-14 RX ORDER — DIPHENHYDRAMINE HCL 25 MG
25 CAPSULE ORAL EVERY 6 HOURS PRN
Status: DISCONTINUED | OUTPATIENT
Start: 2017-09-14 | End: 2017-09-15 | Stop reason: HOSPADM

## 2017-09-14 RX ORDER — FENTANYL CITRATE 50 UG/ML
INJECTION, SOLUTION INTRAMUSCULAR; INTRAVENOUS PRN
Status: DISCONTINUED | OUTPATIENT
Start: 2017-09-14 | End: 2017-09-14

## 2017-09-14 RX ORDER — HYDROMORPHONE HYDROCHLORIDE 1 MG/ML
.3-.5 INJECTION, SOLUTION INTRAMUSCULAR; INTRAVENOUS; SUBCUTANEOUS EVERY 5 MIN PRN
Status: DISCONTINUED | OUTPATIENT
Start: 2017-09-14 | End: 2017-09-14 | Stop reason: HOSPADM

## 2017-09-14 RX ORDER — ONDANSETRON 4 MG/1
4 TABLET, ORALLY DISINTEGRATING ORAL EVERY 30 MIN PRN
Status: DISCONTINUED | OUTPATIENT
Start: 2017-09-14 | End: 2017-09-14 | Stop reason: HOSPADM

## 2017-09-14 RX ORDER — IOPAMIDOL 612 MG/ML
INJECTION, SOLUTION INTRAVASCULAR PRN
Status: DISCONTINUED | OUTPATIENT
Start: 2017-09-14 | End: 2017-09-14 | Stop reason: HOSPADM

## 2017-09-14 RX ORDER — ONDANSETRON 2 MG/ML
INJECTION INTRAMUSCULAR; INTRAVENOUS PRN
Status: DISCONTINUED | OUTPATIENT
Start: 2017-09-14 | End: 2017-09-14

## 2017-09-14 RX ORDER — SODIUM CHLORIDE, SODIUM LACTATE, POTASSIUM CHLORIDE, CALCIUM CHLORIDE 600; 310; 30; 20 MG/100ML; MG/100ML; MG/100ML; MG/100ML
INJECTION, SOLUTION INTRAVENOUS CONTINUOUS
Status: DISCONTINUED | OUTPATIENT
Start: 2017-09-14 | End: 2017-09-14 | Stop reason: HOSPADM

## 2017-09-14 RX ORDER — LEVOFLOXACIN 5 MG/ML
500 INJECTION, SOLUTION INTRAVENOUS EVERY 24 HOURS
Status: DISCONTINUED | OUTPATIENT
Start: 2017-09-14 | End: 2017-09-15 | Stop reason: HOSPADM

## 2017-09-14 RX ORDER — HYDROMORPHONE HYDROCHLORIDE 1 MG/ML
.3-.5 INJECTION, SOLUTION INTRAMUSCULAR; INTRAVENOUS; SUBCUTANEOUS EVERY 10 MIN PRN
Status: DISCONTINUED | OUTPATIENT
Start: 2017-09-14 | End: 2017-09-14 | Stop reason: HOSPADM

## 2017-09-14 RX ORDER — FENTANYL CITRATE 50 UG/ML
25-50 INJECTION, SOLUTION INTRAMUSCULAR; INTRAVENOUS
Status: DISCONTINUED | OUTPATIENT
Start: 2017-09-14 | End: 2017-09-14 | Stop reason: HOSPADM

## 2017-09-14 RX ORDER — GLYCOPYRROLATE 0.2 MG/ML
INJECTION, SOLUTION INTRAMUSCULAR; INTRAVENOUS PRN
Status: DISCONTINUED | OUTPATIENT
Start: 2017-09-14 | End: 2017-09-14

## 2017-09-14 RX ORDER — MEPERIDINE HYDROCHLORIDE 25 MG/ML
12.5 INJECTION INTRAMUSCULAR; INTRAVENOUS; SUBCUTANEOUS EVERY 5 MIN PRN
Status: DISCONTINUED | OUTPATIENT
Start: 2017-09-14 | End: 2017-09-14 | Stop reason: HOSPADM

## 2017-09-14 RX ORDER — CLINDAMYCIN PHOSPHATE 900 MG/50ML
INJECTION, SOLUTION INTRAVENOUS PRN
Status: DISCONTINUED | OUTPATIENT
Start: 2017-09-14 | End: 2017-09-14

## 2017-09-14 RX ORDER — METOCLOPRAMIDE HYDROCHLORIDE 5 MG/ML
10 INJECTION INTRAMUSCULAR; INTRAVENOUS ONCE
Status: COMPLETED | OUTPATIENT
Start: 2017-09-14 | End: 2017-09-14

## 2017-09-14 RX ORDER — FENTANYL CITRATE 50 UG/ML
25-50 INJECTION, SOLUTION INTRAMUSCULAR; INTRAVENOUS
Status: DISCONTINUED | OUTPATIENT
Start: 2017-09-14 | End: 2017-09-14

## 2017-09-14 RX ORDER — DEXAMETHASONE SODIUM PHOSPHATE 4 MG/ML
INJECTION, SOLUTION INTRA-ARTICULAR; INTRALESIONAL; INTRAMUSCULAR; INTRAVENOUS; SOFT TISSUE PRN
Status: DISCONTINUED | OUTPATIENT
Start: 2017-09-14 | End: 2017-09-14

## 2017-09-14 RX ORDER — HYDROMORPHONE HYDROCHLORIDE 1 MG/ML
0.5 INJECTION, SOLUTION INTRAMUSCULAR; INTRAVENOUS; SUBCUTANEOUS
Status: DISCONTINUED | OUTPATIENT
Start: 2017-09-14 | End: 2017-09-15 | Stop reason: HOSPADM

## 2017-09-14 RX ADMIN — SODIUM CHLORIDE 1000 ML: 9 INJECTION, SOLUTION INTRAVENOUS at 01:13

## 2017-09-14 RX ADMIN — SODIUM CHLORIDE 1000 ML: 9 INJECTION, SOLUTION INTRAVENOUS at 14:07

## 2017-09-14 RX ADMIN — FENTANYL CITRATE 50 MCG: 50 INJECTION, SOLUTION INTRAMUSCULAR; INTRAVENOUS at 11:14

## 2017-09-14 RX ADMIN — ONDANSETRON 4 MG: 2 INJECTION INTRAMUSCULAR; INTRAVENOUS at 11:14

## 2017-09-14 RX ADMIN — ONDANSETRON 4 MG: 2 INJECTION INTRAMUSCULAR; INTRAVENOUS at 13:53

## 2017-09-14 RX ADMIN — DEXAMETHASONE SODIUM PHOSPHATE 4 MG: 4 INJECTION, SOLUTION INTRA-ARTICULAR; INTRALESIONAL; INTRAMUSCULAR; INTRAVENOUS; SOFT TISSUE at 11:15

## 2017-09-14 RX ADMIN — LEVOFLOXACIN 500 MG: 5 INJECTION, SOLUTION INTRAVENOUS at 09:22

## 2017-09-14 RX ADMIN — CLINDAMYCIN PHOSPHATE 900 MG: 18 INJECTION, SOLUTION INTRAVENOUS at 11:28

## 2017-09-14 RX ADMIN — METOCLOPRAMIDE 10 MG: 5 INJECTION, SOLUTION INTRAMUSCULAR; INTRAVENOUS at 16:11

## 2017-09-14 RX ADMIN — MIDAZOLAM HYDROCHLORIDE 1 MG: 1 INJECTION, SOLUTION INTRAMUSCULAR; INTRAVENOUS at 11:13

## 2017-09-14 RX ADMIN — KETOROLAC TROMETHAMINE 30 MG: 30 INJECTION, SOLUTION INTRAMUSCULAR at 05:54

## 2017-09-14 RX ADMIN — DIPHENHYDRAMINE HYDROCHLORIDE 25 MG: 25 CAPSULE ORAL at 22:25

## 2017-09-14 RX ADMIN — PROPOFOL 100 MCG/KG/MIN: 10 INJECTION, EMULSION INTRAVENOUS at 11:22

## 2017-09-14 RX ADMIN — SODIUM CHLORIDE, POTASSIUM CHLORIDE, SODIUM LACTATE AND CALCIUM CHLORIDE: 600; 310; 30; 20 INJECTION, SOLUTION INTRAVENOUS at 11:01

## 2017-09-14 RX ADMIN — SODIUM CHLORIDE 1000 ML: 9 INJECTION, SOLUTION INTRAVENOUS at 09:19

## 2017-09-14 RX ADMIN — FENTANYL CITRATE 50 MCG: 50 INJECTION, SOLUTION INTRAMUSCULAR; INTRAVENOUS at 11:17

## 2017-09-14 RX ADMIN — KETOROLAC TROMETHAMINE 30 MG: 30 INJECTION, SOLUTION INTRAMUSCULAR at 13:23

## 2017-09-14 RX ADMIN — GLYCOPYRROLATE 0.2 MG: 0.2 INJECTION, SOLUTION INTRAMUSCULAR; INTRAVENOUS at 11:28

## 2017-09-14 RX ADMIN — PROCHLORPERAZINE EDISYLATE 10 MG: 5 INJECTION INTRAMUSCULAR; INTRAVENOUS at 14:14

## 2017-09-14 RX ADMIN — TAMSULOSIN HYDROCHLORIDE 0.4 MG: 0.4 CAPSULE ORAL at 09:19

## 2017-09-14 RX ADMIN — MIDAZOLAM HYDROCHLORIDE 1 MG: 1 INJECTION, SOLUTION INTRAMUSCULAR; INTRAVENOUS at 11:15

## 2017-09-14 RX ADMIN — MORPHINE SULFATE 4 MG: 4 INJECTION, SOLUTION INTRAMUSCULAR; INTRAVENOUS at 13:55

## 2017-09-14 NOTE — PLAN OF CARE
Problem: Perioperative Period (Adult)  Goal: Signs and Symptoms of Listed Potential Problems Will be Absent or Manageable (Perioperative Period)  Signs and symptoms of listed potential problems will be absent or manageable by discharge/transition of care (reference Perioperative Period (Adult) CPG).   Outcome: No Change  Patient nauseated and having pain. Had 100 ml greenish-yellow emesis.Given zofran and that was ineffective. Resting comfortably after compazine given. Given morphine 4 mg and toradol 30 mg as ordered for pain. The morphine order was discontinued. Will talk to Dr. Bebo Rene about getting dilaudid. He has not voided, but has been up a couple of times as feels he has to urinate.  here. Patient resting comfortably at this time. Will monitor.

## 2017-09-14 NOTE — ED NOTES
Spoke with Pts  and they have voiced concern about  needing to leave. Have spoke with PT and discussed resources we have for ASL. PT states he has used the iPad previously and has difficulty with it and prefers a live in person . I have discussed this need with charge nurse and we are attempting to make arrangements.

## 2017-09-14 NOTE — ANESTHESIA CARE TRANSFER NOTE
Patient: Santo Toure    Procedure(s):  Cysto , right stent placement - Wound Class: II-Clean Contaminated    Diagnosis: ;  Diagnosis Additional Information: No value filed.    Anesthesia Type:   MAC     Note:  Airway :Nasal Cannula  Patient transferred to:PACU        Vitals: (Last set prior to Anesthesia Care Transfer)    CRNA VITALS  9/14/2017 1120 - 9/14/2017 1155      9/14/2017             Pulse: 57    SpO2: 99 %                Electronically Signed By: ROSSY Joseph CRNA  September 14, 2017  11:55 AM

## 2017-09-14 NOTE — OR NURSING
Pt arrived from floor with wife and .  Pt alert, interactive and in good spirits.  Denies any adverse history with anesthesia.  No complaints of pain currently, only with urination.  Consent is signed

## 2017-09-14 NOTE — ANESTHESIA POSTPROCEDURE EVALUATION
Patient: Santo Toure    Procedure(s):  Cysto , right stent placement - Wound Class: II-Clean Contaminated    Diagnosis:;  Diagnosis Additional Information: No value filed.    Anesthesia Type:  MAC    Note:  Anesthesia Post Evaluation    Patient location during evaluation: PACU  Patient participation: Able to fully participate in evaluation  Level of consciousness: awake  Pain management: adequate  Airway patency: patent  Cardiovascular status: stable  Respiratory status: nasal cannula  Hydration status: stable  PONV: none     Anesthetic complications: None          Last vitals:  Vitals:    09/13/17 2133 09/14/17 0025 09/14/17 0846   BP: (!) 137/91 133/81 119/63   Pulse: 58 64    Resp: 18 18 16   Temp: 36.8  C (98.3  F) 36.4  C (97.5  F)    SpO2: 99% 94% 94%         Electronically Signed By: ROSSY Joseph CRNA  September 14, 2017  11:55 AM

## 2017-09-14 NOTE — BRIEF OP NOTE
Pre-op Dx: obstructing right ureteral stone  Post-op Dx: same  Procedure: cystoscopy, right ureteral stent placement  EBL: 1 cc  Specimens: none  Drains: right 6 x 26 JJ stent  No complications.

## 2017-09-14 NOTE — PROGRESS NOTES
[facility] Medicine Progress Note  Date of Service: 09/14/2017        Assessment & Plan   Santo Toure is a 37 year old male with deafness who presented on 9/13/2017 with acute onset of right-sided flank and abdominal pain, nausea. CT scan showed obstructing proximal right ureteral stone  with moderate Right Hydronephrosis and acute UTI. He had Urologu evaluation by Dr. Lundy who did cystoscopy with right ureteral stent placement 9/14/2017    Active Problems:      Proximal Right Kidney stone  Right Hydronephrosis  Acute UTI  Mute       DVT Prophylaxis: Low Risk/Ambulatory with no VTE prophylaxis indicated  Code Status: Full Code         Plan:   Diet   Pain management   Follow up Urine culture  Check BMP in am.     Disposition: Anticipate discharge 9/15/2017               Interval History   Denies any pain in the Right back  Patient has not have any food and he wants to eat.    Physical Exam   Temp:  [97.5  F (36.4  C)-98.6  F (37  C)] 98.2  F (36.8  C)  Pulse:  [58-76] 64  Heart Rate:  [41-75] 52  Resp:  [13-18] 16  BP: (102-164)/() 115/79  SpO2:  [94 %-99 %] 98 %    Weights:   Vitals:    09/13/17 1732 09/13/17 2133   Weight: 97.5 kg (215 lb) 98.5 kg (217 lb 2.5 oz)    Body mass index is 29.45 kg/(m^2).    Constitutional: Not in any acute distress   EYES: Extra Occular movements are intact, Conjunctiva is clear   NECK: no  JVD  CVS: S1 S2  Regular  Respiratory: Clear to auscultation without crepitations or Wheezing  bilaterally  GI: Soft, non tender, Bowel Sounds are Positive   Skin: Warm and dry, No Rashes  CNS: Alert and Oriented x 3   Musculoskeletal: Lower Limbs without Pedal Edema            Data     Recent Labs  Lab 09/14/17  0615 09/13/17  1841   WBC 16.4* 18.5*   HGB 13.2* 14.9   MCV 82 81    181    140   POTASSIUM 4.0 4.0   CHLORIDE 108 105   CO2 28 28   BUN 7 6*   CR 0.76 0.74   ANIONGAP 6 7   JESSIE 8.3* 9.3   * 100*   ALBUMIN  --  4.0    PROTTOTAL  --  7.5   BILITOTAL  --  0.5   ALKPHOS  --  87   ALT  --  27   AST  --  12         Recent Labs  Lab 09/14/17  0615 09/13/17  1841   * 100*        Unresulted Labs Ordered in the Past 30 Days of this Admission     Date and Time Order Name Status Description    9/13/2017 2046 Urine Culture Aerobic Bacterial Preliminary            Imaging  Recent Results (from the past 24 hour(s))   CT Abdomen Pelvis w Contrast    Narrative    CT ABDOMEN AND PELVIS WITH CONTRAST 9/13/2017 6:54 PM     HISTORY: RLQ abdominal pain.    TECHNIQUE: Axial images from the lung bases to the symphysis are  performed with additional coronal reformatted images. 100 mL of Isovue  370 are given intravenously.  Radiation dose for this scan was reduced  using automated exposure control, adjustment of the mA and/or kV  according to patient size, or iterative reconstruction technique.    FINDINGS:     Calcified granuloma is noted within the right lower lobe. Lung bases  are otherwise clear.    Abdomen: The liver, spleen, gallbladder, pancreas and adrenal glands  are within normal limits. Bilateral renal cortical cysts are present.  There is slight delayed nephrogram right kidney with areas of  decreased cortical enhancement compared to the contralateral left  kidney concerning for pyelonephritis. There is mild right  hydronephrosis due to an obstructing 0.3 cm proximal right ureteral  stone on series 2, image 54. No left urinary tract calculi. No  enlarged abdominal lymph nodes. The bowel is normal in caliber without  obstruction, diverticulitis or appendicitis.    Pelvis: The bladder, prostate and rectum are unremarkable. No enlarged  pelvic lymph nodes or free fluid. Bone window examination is within  normal limits.      Impression    IMPRESSION:  1. Obstructing proximal right ureteral stone measuring 0.3 cm. Delayed  right nephrogram, hydronephrosis and possible right pyelonephritis  changes are noted. No left urinary tract  calculi.  2. No other acute changes are evident in the abdomen or pelvis to  account for patient's symptoms.  3. Evidence of old granulomatous disease.    MASSIMO WU MD   XR Surgery DEONTE Fluoro L/T 5 Min w Stills    Narrative    XR SURGERY DEONTE FLUORO LESS THAN 5 MIN W STILLS 9/14/2017 11:58 AM    HISTORY: Stent placement.    Fluoroscopy time: 1.4 minutes.    COMPARISON: None.    FINDINGS: 2 fluoroscopic images were obtained during right ureteral  stent placement.      Impression    IMPRESSION: Procedure fluoroscopy.    STEVE QUIGLEY MD        I reviewed all new labs and imaging results over the last 24 hours. I personally reviewed no images or EKG's today.    Medications     NaCl 1,000 mL (09/14/17 1407)       levofloxacin  500 mg Intravenous Q24H     sodium chloride (PF)  3 mL Intracatheter Q8H     tamsulosin  0.4 mg Oral Daily             Bebo SAVAGE.  Hospitalist - Internal Medicine  [facility], [place]   This dictation is generated using a voice recognition software. Please disregard unintended words or phrases.

## 2017-09-14 NOTE — PROGRESS NOTES
[facility] TRANSPORT NOTE  Data:   Reason for Transport:  Post-op    Santo Toure was transported from PACU via cart at 1245.  Patient was accompanied by Nursing Assistant. Equipment used for transport: IV pump. Family was aware of reason for transport: yes    Action:  Report: received from PACU nurse.    Response:  Patient's condition when transferred off unit was stable..    Ava Jean

## 2017-09-14 NOTE — ANESTHESIA PREPROCEDURE EVALUATION
Anesthesia Evaluation     . Pt has had prior anesthetic. Type: General           ROS/MED HX    ENT/Pulmonary:     (+)asthma , . .    Neurologic: Comment: Deaf  Meniere's disease      Cardiovascular:  - neg cardiovascular ROS   (+) ----. : . . . :. . Previous cardiac testing date:results:date: results:ECG reviewed date:1-12-17 results:SB (58) date: results:          METS/Exercise Tolerance:  >4 METS   Hematologic:  - neg hematologic  ROS       Musculoskeletal:  - neg musculoskeletal ROS       GI/Hepatic:  - neg GI/hepatic ROS      (-) liver disease   Renal/Genitourinary:     (+) Nephrolithiasis ,       Endo:  - neg endo ROS       Psychiatric:  - neg psychiatric ROS       Infectious Disease:  - neg infectious disease ROS       Malignancy:      - no malignancy   Other:    - neg other ROS                 Physical Exam  Normal systems: cardiovascular, pulmonary and dental    Airway   Mallampati: II    Dental     Cardiovascular       Pulmonary                     Anesthesia Plan      History & Physical Review  History and physical reviewed and following examination; no interval change.    ASA Status:  2 .        Plan for MAC Reason for MAC:  Deep or markedly invasive procedure (G8)  PONV prophylaxis:  Ondansetron (or other 5HT-3) and Dexamethasone or Solumedrol  Agrees to MAC, OK with general if necessary.      Postoperative Care      Consents  Anesthetic plan, risks, benefits and alternatives discussed with:  Patient..                          .

## 2017-09-14 NOTE — PROGRESS NOTES
[facility] ADMISSION NOTE    Patient admitted to room 2314 at approximately 2130 via cart from emergency room. Patient was accompanied by spouse and transport tech.     Verbal SBAR report received from Lizet prior to patient arrival.     Patient ambulated to bed with stand-by assist. Patient alert and oriented X 3. Pain is not well controlled.  Medication(s) being used: prescription NSAID's including none and narcotic analgesics including morphine.  . Admission vital signs: Blood pressure (!) 137/91, pulse 58, temperature 98.3  F (36.8  C), temperature source Axillary, resp. rate 18, height 1.829 m (6'), weight 98.5 kg (217 lb 2.5 oz), SpO2 99 %. Patient was oriented to plan of care, call light, bed controls, tv, telephone, bathroom and visiting hours.     The following safety risks were identified during admission: none. Yellow risk band applied: LOUISA Lizama

## 2017-09-14 NOTE — CONSULTS
REASON FOR CONSULT:  Obstructing right ureteral stone.      HISTORY OF PRESENT ILLNESS:  We were asked to see Mr. Santo Toure by Dr. Bebo Rene for an obstructing right ureteral stone.  The patient presented to the emergency room yesterday with acute onset of right-sided flank and abdominal pain with nausea.  He was evaluated in the ER and found to have a 3 x 7 mm obstructing right ureteral stone.  He was admitted for pain control and Urology was asked to evaluate the patient today.  This morning, the patient notes that his pain is somewhat better and is limited to his right abdomen.  He notes that this is his first stone episode.      The patient's past medical history is significant for deafness.  Remainder of the patient's past medical history, past surgical history, medications, allergies, family history and social history were reviewed in [facility].       REVIEW OF SYSTEMS:  Negative for fevers, chills or sweats.  Positive for nausea as noted above.  No unexplained weight changes.      PHYSICAL EXAMINATION:    VITAL SIGNS:  The patient's temperature this morning is 97.5, heart rate is 68, pressure 119/63.   GASTROINTESTINAL:  The patient's abdomen is soft, with some right lower quadrant tenderness.  There is no flank tenderness.      LABORATORY DATA:  His white count upon admission was 18.5.  This morning, it is still 16.4.  Creatinine today is 0.76.  The patient's urinalysis from yesterday showed 26 white blood cells, 6 red blood cells, few bacteria, nitrite negative, but leukocyte esterase large.  The patient's CT scan from 09/13/2017 showed the obstructing proximal right ureteral stone measuring 3 x 7 mm with moderate hydronephrosis.      ASSESSMENT AND PLAN:  Over half of today's 30-minute visit was spent counseling the patient regarding his right obstructing ureteral stone.  I counseled Mr. Toure that he does appear to have an obstructing right ureteral stone.  There is a question of infection, though  fortunately, the patient is not having any fevers.  We discussed options including continued observation with trial of passage versus placement of a stent today.  After discussion of risks and benefits of the various approaches, the patient wishes to move forward with stent placement today with definitive management of the stones in the future.  We will make plans to bring the patient to the operating room in the near future for cystoscopy and right stent placement.         STEVE DAVID MD             D: 2017 11:27   T: 2017 13:11   MT: [id number]      Name:     SARAH VILLELA   MRN:      [id number]        Account:       [id number]   :      1980           Consult Date:  2017      Document: [id number]

## 2017-09-14 NOTE — PROGRESS NOTES
[facility] TRANSPORT NOTE  Data:   Reason for Transport:  Surgery    Santo Toure was transported to surgery rm 18 via wheel chair at 1045.  Patient was accompanied by Nursing Assistant, . Equipment used for transport: None. Family was aware of reason for transport: yes    Action:  Report: given to Dona RIZO.    Response:  Patient's condition when transferred off unit was stable.    Ava Jean

## 2017-09-14 NOTE — PROGRESS NOTES
Paged Dr. Lundy to inform home that nursing did not receive any post op orders. He said it was up to the primary MD to determine orders. He said he wasn't changing anything. Will do the typical post op VS for patients having MAC anesthesia every 15 minutes for 1 hour.

## 2017-09-14 NOTE — ED NOTES
Patient states he is willing to use 'in person' interpretor for MD discussions and lengthy intake procedures/discussion although for quick needs or simple interactions is okay using Ipad type interpretor. Patient has interpretor from the clinic and she is here until 10 - will attempt to transfer patient prior to interpretor leaving.

## 2017-09-14 NOTE — H&P
CHIEF COMPLAINT:  Right groin pain.      HISTORY OF PRESENT ILLNESS:  The patient Santo Toure is a 37-year-old man has been in otherwise good health and was walking his dog when he experienced the sudden onset of severe colicky pain in the right low abdomen.  This pain was nonradiating, but came in waves.  It has been increasing since this morning despite pain medications in the Emergency Department.  Approximately 1 hour prior to admission to the Emergency Department he started having nausea and vomiting as well.      The patient denies urinary symptoms, urgency or frequency, no diarrhea, no fevers or chills and no cardiorespiratory symptoms at this time.      PAST MEDICAL HISTORY:   1.  Deafness.   2.  History of Meniere's disease as a child requiring multiple hospitalizations.   3.  Intermittent asthma.      [id number] MEDICATIONS:  lbuterol inhaler 2 puffs q.4h. p.r.n.      ALLERGIES:  Penicillins caused vomiting as an infant.      SOCIAL HISTORY:  The patient has never smoked, drinks rare alcohol.  He lives with his wife and children.      REVIEW OF SYSTEMS:  A 10-point review is negative except as above.      PHYSICAL EXAMINATION:   GENERAL:  The patient is awake, alert, oriented x3, deaf and using a .  He appears ill and retches and vomits intermittently.   HEENT:  Normal.   NECK:  Supple without masses, nodes or thyromegaly.   RESPIRATORY:  Lungs clear to A&P.   CARDIOVASCULAR:  Regular rate and rhythm without murmur, click or rub, pulses brisk and equal, no edema.   ABDOMEN:  Soft with tenderness in the right mid abdomen and specifically in the right lower quadrant.   BACK:  I cannot elicit much CVA tenderness.   EXTREMITIES:  Grossly normal.   NEUROLOGIC:  Grossly normal examination with good finger-nose, rapid alternating movements, good strength and sensation in the upper extremities, good strength and sensation in the lower extremities.      LABORATORY AND IMAGING  RESULTS:    1.  Urinalysis contains 26 white blood cells/hpf, 6 red blood cells/hpf, no nitrites.     2.  Basic metabolic profile generally normal.     3.  White count 18.5.     4.  CT scan of the abdomen and pelvis shows an obstructing proximal right ureteral stone    measuring 3 mm with a delayed right nephrogram and hydronephrosis with possible pyelonephritis changes.      ASSESSMENT/PLAN:      1.  Right 3 mm proximal ureteral stone.  The case was discussed by the Emergency Department Physician [facility]  They felt this did not represent an infected stone and recommended the following:   a.  Observation overnight.   b.  Urologic consultation in the a.m.     c.  We will watch for fevers.   d.  We will treat with IV fluids.   e.  We will treat with pain medications including morphine, oxycodone, Tylenol and Toradol.   f.  We will treat with nausea medications including Compazine and Zofran.     g.  We will give the patient 1 dose of Flomax if he can take it orally.     h.  The above therapy was discussed with the patient's wife and with the patient through the .   2.  Full code status.   3.  Deep vein thrombus prophylaxis.  None, patient is low risk.   4.  Deafness.     a.  The patient will need a  for interactions with Physicians, although his wife does sign.   5.  Disposition.     a.  He is Observation for now with no evidence of infection and pain reasonably controlled.   b.  He may be discharged in the a.m.         JAKY YEN MD             D: 2017 20:46   T: 2017 22:39   MT: [id number]      Name:     SARAH VILLELA   MRN:      [id number]        Account:      [id number]   :      1980           Admitted:     [id number]      Document: [id number]

## 2017-09-15 VITALS
SYSTOLIC BLOOD PRESSURE: 117 MMHG | WEIGHT: 217.15 LBS | BODY MASS INDEX: 29.41 KG/M2 | RESPIRATION RATE: 18 BRPM | HEIGHT: 72 IN | HEART RATE: 69 BPM | TEMPERATURE: 97.7 F | DIASTOLIC BLOOD PRESSURE: 72 MMHG | OXYGEN SATURATION: 96 %

## 2017-09-15 LAB
ANION GAP SERPL CALCULATED.3IONS-SCNC: 5 MMOL/L (ref 3–14)
BACTERIA SPEC CULT: ABNORMAL
BUN SERPL-MCNC: 7 MG/DL (ref 7–30)
CALCIUM SERPL-MCNC: 8.1 MG/DL (ref 8.5–10.1)
CHLORIDE SERPL-SCNC: 108 MMOL/L (ref 94–109)
CO2 SERPL-SCNC: 28 MMOL/L (ref 20–32)
CREAT SERPL-MCNC: 0.72 MG/DL (ref 0.66–1.25)
ERYTHROCYTE [DISTWIDTH] IN BLOOD BY AUTOMATED COUNT: 13.3 % (ref 10–15)
GFR SERPL CREATININE-BSD FRML MDRD: >90 ML/MIN/1.7M2
GLUCOSE SERPL-MCNC: 113 MG/DL (ref 70–99)
HCT VFR BLD AUTO: 36 % (ref 40–53)
HGB BLD-MCNC: 11.6 G/DL (ref 13.3–17.7)
Lab: ABNORMAL
MCH RBC QN AUTO: 27.2 PG (ref 26.5–33)
MCHC RBC AUTO-ENTMCNC: 32.2 G/DL (ref 31.5–36.5)
MCV RBC AUTO: 85 FL (ref 78–100)
PLATELET # BLD AUTO: 121 10E9/L (ref 150–450)
POTASSIUM SERPL-SCNC: 3.6 MMOL/L (ref 3.4–5.3)
RBC # BLD AUTO: 4.26 10E12/L (ref 4.4–5.9)
SODIUM SERPL-SCNC: 141 MMOL/L (ref 133–144)
SPECIMEN SOURCE: ABNORMAL
WBC # BLD AUTO: 12 10E9/L (ref 4–11)

## 2017-09-15 PROCEDURE — 25000128 H RX IP 250 OP 636: Performed by: INTERNAL MEDICINE

## 2017-09-15 PROCEDURE — 36415 COLL VENOUS BLD VENIPUNCTURE: CPT | Performed by: INTERNAL MEDICINE

## 2017-09-15 PROCEDURE — 25000128 H RX IP 250 OP 636: Performed by: EMERGENCY MEDICINE

## 2017-09-15 PROCEDURE — 85027 COMPLETE CBC AUTOMATED: CPT | Performed by: INTERNAL MEDICINE

## 2017-09-15 PROCEDURE — 80048 BASIC METABOLIC PNL TOTAL CA: CPT | Performed by: INTERNAL MEDICINE

## 2017-09-15 PROCEDURE — G0378 HOSPITAL OBSERVATION PER HR: HCPCS

## 2017-09-15 PROCEDURE — 25000132 ZZH RX MED GY IP 250 OP 250 PS 637: Performed by: FAMILY MEDICINE

## 2017-09-15 PROCEDURE — 99217 ZZC OBSERVATION CARE DISCHARGE: CPT | Performed by: INTERNAL MEDICINE

## 2017-09-15 PROCEDURE — 99207 ZZC CDG-CODE CATEGORY CHANGED: CPT | Performed by: INTERNAL MEDICINE

## 2017-09-15 RX ORDER — ACETAMINOPHEN 500 MG
1000 TABLET ORAL EVERY 6 HOURS PRN
Qty: 1 BOTTLE | Refills: 0 | Status: SHIPPED | OUTPATIENT
Start: 2017-09-15 | End: 2023-11-06

## 2017-09-15 RX ORDER — OXYCODONE HYDROCHLORIDE 5 MG/1
5-10 TABLET ORAL
Qty: 15 TABLET | Refills: 0 | Status: SHIPPED | OUTPATIENT
Start: 2017-09-15 | End: 2017-09-20

## 2017-09-15 RX ORDER — ONDANSETRON 4 MG/1
4 TABLET, ORALLY DISINTEGRATING ORAL EVERY 6 HOURS PRN
Qty: 15 TABLET | Refills: 0 | Status: SHIPPED | OUTPATIENT
Start: 2017-09-15 | End: 2017-10-07

## 2017-09-15 RX ORDER — LACTOBACILLUS RHAMNOSUS GG 10B CELL
1 CAPSULE ORAL 2 TIMES DAILY
Qty: 20 CAPSULE | Refills: 0 | Status: SHIPPED | OUTPATIENT
Start: 2017-09-15 | End: 2017-09-25

## 2017-09-15 RX ORDER — CIPROFLOXACIN 500 MG/1
500 TABLET, FILM COATED ORAL 2 TIMES DAILY
Qty: 18 TABLET | Refills: 0 | Status: SHIPPED | OUTPATIENT
Start: 2017-09-15 | End: 2017-09-24

## 2017-09-15 RX ADMIN — ACETAMINOPHEN 1000 MG: 500 TABLET ORAL at 06:04

## 2017-09-15 RX ADMIN — SODIUM CHLORIDE 1000 ML: 9 INJECTION, SOLUTION INTRAVENOUS at 05:54

## 2017-09-15 RX ADMIN — LEVOFLOXACIN 500 MG: 5 INJECTION, SOLUTION INTRAVENOUS at 08:33

## 2017-09-15 RX ADMIN — TAMSULOSIN HYDROCHLORIDE 0.4 MG: 0.4 CAPSULE ORAL at 08:36

## 2017-09-15 NOTE — PLAN OF CARE
Problem: Discharge Planning  Goal: Discharge Planning (Adult, OB, Behavioral, Peds)  Outcome: Adequate for Discharge Date Met:  09/15/17  [facility] DISCHARGE NOTE     Patient discharged to home at 10:32 AM via ambulation. Accompanied by friend and staff. Discharge instructions reviewed with patient, opportunity offered to ask questions. Prescriptions sent to patients preferred pharmacy. All belongings sent with patient.     Belkis Cannon

## 2017-09-15 NOTE — PLAN OF CARE
Problem: Goal Outcome Summary  Goal: Goal Outcome Summary  Outcome: Improving  Pt denies pain or SOB. Has denied any nausea or vomiting overnight. Tolerating liquids, has a regular diet. Pt communicating that he is looking forward to eating breakfast. Has been voiding without difficulty, urine is tristian color. Vital signs are stable, afebrile. Pt using a white board to communicate. He is able to make his needs be known.

## 2017-09-15 NOTE — DISCHARGE SUMMARY
[facility]    Discharge Summary  Hospital Medicine    Date of Admission:  9/13/2017  Date of Discharge:  9/15/2017   Discharging Provider: Bebo Rene  Date of Service: 9/15/2017     Primary Care     Primary Dr, Unknown  No address on file      Identification and Chief Compaint: Santo Toure is a 37 year old male who presented on 9/13/2017 with complaint of Right sides abdominal/Flak pain.     Discharge Diagnoses     Proximal Right Kidney stone  Right Hydronephrosis  Acute UTI  Mute     Discharge Disposition   Discharged to home    Discharge Orders     Reason for your hospital stay   Renal Colic due to Ureteric stone with Urinary Tract infection     Follow-up and recommended labs and tests    Follow up with primary care provider, Unknown Primary Dr, within 7 days for hospital follow- up.  The following labs/tests are recommended: CBC with Pyelonephritis.     Activity   Your activity upon discharge: activity as tolerated     When to contact your care team   Call your primary doctor if you have any of the following: temperature greater than 100 F or less than 95 F, increased abdominal pain or Nausea with Vomiting.     Full Code     Diet   Follow this diet upon discharge: Orders Placed This Encounter  Regular Diet Adult - Please drink about 2 liters of fluids or more per day       Discharge Medications   Current Discharge Medication List      START taking these medications    Details   oxyCODONE (ROXICODONE) 5 MG IR tablet Take 1-2 tablets (5-10 mg) by mouth every 3 hours as needed for moderate to severe pain  Qty: 15 tablet, Refills: 0    Associated Diagnoses: Calculi, ureter      acetaminophen (TYLENOL) 500 MG tablet Take 2 tablets (1,000 mg) by mouth every 6 hours as needed for mild pain or fever  Qty: 1 Bottle, Refills: 0    Associated Diagnoses: Calculi, ureter      ondansetron (ZOFRAN-ODT) 4 MG ODT tab Take 1 tablet (4 mg) by mouth every 6 hours as needed for nausea or  vomiting  Qty: 15 tablet, Refills: 0    Associated Diagnoses: Calculi, ureter      ciprofloxacin (CIPRO) 500 MG tablet Take 1 tablet (500 mg) by mouth 2 times daily for 9 days  Qty: 18 tablet, Refills: 0    Associated Diagnoses: Acute pyelonephritis      Lactobacillus-Inulin ([facility] DIGESTIVE [facility]) CAPS Take 1 tablet by mouth 2 times daily for 10 days  Qty: 20 capsule, Refills: 0    Associated Diagnoses: Acute pyelonephritis         CONTINUE these medications which have NOT CHANGED    Details   albuterol (PROAIR HFA/PROVENTIL HFA/VENTOLIN HFA) 108 (90 BASE) MCG/ACT Inhaler Inhale 2 puffs into the lungs every 6 hours as needed for shortness of breath / dyspnea or wheezing  Qty: 1 Inhaler, Refills: 1           Allergies   Allergies   Allergen Reactions     Penicillins Other (See Comments) and Rash     Vomiting as a infant           UROLOGY IP CONSULT  WOUND OSTOMY CONTINENCE NURSE  IP CONSULT    Significant Results and Procedures   Procedures     Dr. Lundy who did cystoscopy with right ureteral stent placement 9/14/2017    Data   Results for orders placed or performed during the hospital encounter of 09/13/17   CT Abdomen Pelvis w Contrast    Narrative    CT ABDOMEN AND PELVIS WITH CONTRAST 9/13/2017 6:54 PM     HISTORY: RLQ abdominal pain.    TECHNIQUE: Axial images from the lung bases to the symphysis are  performed with additional coronal reformatted images. 100 mL of Isovue  370 are given intravenously.  Radiation dose for this scan was reduced  using automated exposure control, adjustment of the mA and/or kV  according to patient size, or iterative reconstruction technique.    FINDINGS:     Calcified granuloma is noted within the right lower lobe. Lung bases  are otherwise clear.    Abdomen: The liver, spleen, gallbladder, pancreas and adrenal glands  are within normal limits. Bilateral renal cortical cysts are present.  There is slight delayed nephrogram right kidney with areas of  decreased cortical  enhancement compared to the contralateral left  kidney concerning for pyelonephritis. There is mild right  hydronephrosis due to an obstructing 0.3 cm proximal right ureteral  stone on series 2, image 54. No left urinary tract calculi. No  enlarged abdominal lymph nodes. The bowel is normal in caliber without  obstruction, diverticulitis or appendicitis.    Pelvis: The bladder, prostate and rectum are unremarkable. No enlarged  pelvic lymph nodes or free fluid. Bone window examination is within  normal limits.      Impression    IMPRESSION:  1. Obstructing proximal right ureteral stone measuring 0.3 cm. Delayed  right nephrogram, hydronephrosis and possible right pyelonephritis  changes are noted. No left urinary tract calculi.  2. No other acute changes are evident in the abdomen or pelvis to  account for patient's symptoms.  3. Evidence of old granulomatous disease.    MASSIMO WU MD   XR Surgery DEONTE Fluoro L/T 5 Min w Stills    Narrative    XR SURGERY DEONTE FLUORO LESS THAN 5 MIN W STILLS 9/14/2017 11:58 AM    HISTORY: Stent placement.    Fluoroscopy time: 1.4 minutes.    COMPARISON: None.    FINDINGS: 2 fluoroscopic images were obtained during right ureteral  stent placement.      Impression    IMPRESSION: Procedure fluoroscopy.    STEVE QUIGLEY MD        History of Present Illness   (From H&P) Please see below    Hospital Course   Santo Toure was admitted on 9/13/2017.  The following problems were addressed during his hospitalization:    Santo Toure is a 37 year old male with deafness who presented on 9/13/2017 with acute onset of right-sided flank and abdominal pain, nausea. CT scan showed obstructing proximal right ureteral stone  with moderate Right Hydronephrosis and acute UTI. He had Urology evaluation by Dr. Lundy who did cystoscopy with right ureteral stent placement 9/14/2017. Urine grew E. Coli which was susceptible to Ciprofloxacin which is prescribed for a total of 10 days.    Pending  Results   Unresulted Labs Ordered in the Past 30 Days of this Admission     No orders found from 7/15/2017 to 9/14/2017.          Physical Exam   Temp:  [96.9  F (36.1  C)-98.2  F (36.8  C)] 97.7  F (36.5  C)  Pulse:  [52-69] 69  Heart Rate:  [41-75] 52  Resp:  [13-18] 18  BP: (102-124)/(48-83) 117/72  SpO2:  [94 %-99 %] 96 %  Vitals:    09/13/17 1732 09/13/17 2133   Weight: 97.5 kg (215 lb) 98.5 kg (217 lb 2.5 oz)       Constitutional: Not in any acute distress  CV: S1 S2 Regular  Respiratory: Clear to auscultation bilaterally  GI: Soft, nontender  CNS: Alert and Oriented x 3       The discharge plan was discussed with the patient who communicates well by writing    Total time on this discharge was 30 minutes.    Bebo Rene   Hospitalist. [facility] [place].

## 2017-09-15 NOTE — PLAN OF CARE
Problem: Goal Outcome Summary  Goal: Goal Outcome Summary  Outcome: Improving  Pt has been up SBA to bathroom. Has been voiding in large amounts- red in color (post stent placement). At shift change pt was nauseous/vomiting had already received compazine and Zofran so Reglan was ordered for a one time dose. This helped and denies being nauseous since. Pain is minimal- has not received any pain medication this shift (pt has declined). Tolerated a clear liquid diet for supper. Pt is hopeful to go home tomorrow.  left at 1830, have been using small white board for communication. /79 (BP Location: Right arm)  Pulse 64  Temp 98.2  F (36.8  C) (Oral)  Resp 16  Ht 1.829 m (6')  Wt 98.5 kg (217 lb 2.5 oz)  SpO2 98%  BMI 29.45 kg/m2  Jailyn Lizama RN BSN

## 2017-09-15 NOTE — OP NOTE
DATE OF PROCEDURE:  09/14/2017      PREOPERATIVE DIAGNOSIS:  Obstructing right ureteral stone.      POSTOPERATIVE DIAGNOSIS:  Obstructing right ureteral stone.      PROCEDURE:  Cystoscopy, right stent placement, right retrograde pyelogram, fluoroscopy, interpretation of fluoroscopic images.      INDICATIONS:  Mr. Santo Toure is a 37-year-old gentleman who presented to the ER yesterday with right-sided flank pain, and was ultimately found to have a 3 x 7 mm right ureteral stone causing obstruction.  He was admitted for pain control.  After discussion of risks, benefits and alternatives, the patient presents today for stenting on the right side amid concern for possible urinary infection with the stone.      DESCRIPTION OF PROCEDURE:  After informed consent was obtained, the patient was brought to the operating room where he was administered MAC anesthesia.  After a suitable level of anesthesia was attained, he was placed in lithotomy position with all pressure points padded.  He was administered preoperative antibiotics.  He was prepped and draped in standard sterile fashion.  Next, a 22-C[place] Verdean cystoscope was introduced into the patient's bladder without difficulty.  Inspection of the bladder revealed orthotopic UOs.  There were no stones, foreign objects, or mucosal lesions noted in the bladder.  Next, the right UO was intubated with a sensor wire, and this was advanced to the kidney under fluoroscopic guidance.  A 5-C[place] Verdean open-ended catheter was then placed over the wire up to the renal pelvis.  Urine was obtained from the renal pelvis and it was clear in appearance.  We shot a retrograde pyelogram in this location to identify the renal pelvis.  There was only minimal to moderate hydronephrosis noted.  Next, the wire was replaced, and a 6 x 26 double-J stent was placed through the cystoscope.  A proximal curl was confirmed by fluoroscopy, distal curl confirmed by direct vision.  The patient's bladder was  drained.  He was awakened and brought to the recovery room in stable condition.      SURGEON:  Steve Lundy MD      ESTIMATED BLOOD LOSS:  1 cc.      COMPLICATIONS:  There were no immediate complications noted.      DRAINS:  Right 6 x 26 double-J stent.      SPECIMENS:  No specimens.         STEVE LUNDY MD             D: 2017 11:47   T: 09/15/2017 02:21   MT: [id number]      Name:     SARAH VILLELA   MRN:      [id number]        Account:        [id number]   :      1980           Procedure Date: 2017      Document: [id number]       cc: [facility]

## 2017-09-20 ENCOUNTER — OFFICE VISIT (OUTPATIENT)
Dept: FAMILY MEDICINE | Facility: CLINIC | Age: 37
End: 2017-09-20
Payer: COMMERCIAL

## 2017-09-20 ENCOUNTER — TELEPHONE (OUTPATIENT)
Dept: UROLOGY | Facility: CLINIC | Age: 37
End: 2017-09-20

## 2017-09-20 VITALS
HEART RATE: 88 BPM | DIASTOLIC BLOOD PRESSURE: 84 MMHG | TEMPERATURE: 95.9 F | BODY MASS INDEX: 30.03 KG/M2 | SYSTOLIC BLOOD PRESSURE: 124 MMHG | WEIGHT: 221.4 LBS

## 2017-09-20 DIAGNOSIS — Z01.812 PRE-PROCEDURE LAB EXAM: Primary | ICD-10-CM

## 2017-09-20 DIAGNOSIS — N20.1 CALCULI, URETER: ICD-10-CM

## 2017-09-20 PROCEDURE — 99214 OFFICE O/P EST MOD 30 MIN: CPT | Performed by: PHYSICIAN ASSISTANT

## 2017-09-20 PROCEDURE — T1013 SIGN LANG/ORAL INTERPRETER: HCPCS | Mod: U3 | Performed by: PHYSICIAN ASSISTANT

## 2017-09-20 RX ORDER — OXYCODONE HYDROCHLORIDE 5 MG/1
5-10 TABLET ORAL
Qty: 15 TABLET | Refills: 0 | Status: SHIPPED | OUTPATIENT
Start: 2017-09-20 | End: 2017-09-26

## 2017-09-20 RX ORDER — KETOROLAC TROMETHAMINE 10 MG/1
10 TABLET, FILM COATED ORAL EVERY 6 HOURS PRN
Qty: 20 TABLET | Refills: 0 | Status: SHIPPED | OUTPATIENT
Start: 2017-09-20 | End: 2018-02-07

## 2017-09-20 ASSESSMENT — ENCOUNTER SYMPTOMS
WHEEZING: 0
VOMITING: 0
COUGH: 0
HEARTBURN: 0
WEAKNESS: 0
SPUTUM PRODUCTION: 0
SEIZURES: 0
LOSS OF CONSCIOUSNESS: 0
NERVOUS/ANXIOUS: 0
HEMOPTYSIS: 0
PALPITATIONS: 0
SENSORY CHANGE: 0
PHOTOPHOBIA: 0
CONSTIPATION: 0
TINGLING: 0
ABDOMINAL PAIN: 0
WEIGHT LOSS: 0
ORTHOPNEA: 0
BACK PAIN: 1
INSOMNIA: 0
DIAPHORESIS: 0
BLOOD IN STOOL: 0
FREQUENCY: 0
SHORTNESS OF BREATH: 0
DIARRHEA: 0
DYSURIA: 0
BLURRED VISION: 0
EYE PAIN: 0
NEUROLOGICAL NEGATIVE: 1
SORE THROAT: 0
DEPRESSION: 0
HEADACHES: 0
DOUBLE VISION: 0
HALLUCINATIONS: 0
NAUSEA: 0
DIZZINESS: 0
NECK PAIN: 0
EYE DISCHARGE: 0
FEVER: 0
MYALGIAS: 0
FOCAL WEAKNESS: 0
EYE REDNESS: 0

## 2017-09-20 ASSESSMENT — PAIN SCALES - GENERAL: PAINLEVEL: EXTREME PAIN (8)

## 2017-09-20 ASSESSMENT — LIFESTYLE VARIABLES: SUBSTANCE_ABUSE: 0

## 2017-09-20 NOTE — PROGRESS NOTES
HPI    SUBJECTIVE:   Santo Toure is a 37 year old male who presents to clinic today for follow-up after hospitalization and surgical intervention related to a kidney stone/ureter stone. A stent was placed and he was placed on antibiotics as well. He will have another procedure in a couple of more weeks.        Hospital Follow-up Visit:    Hospital/Nursing Home/IP Rehab Facility: [facility]  Date of Admission: 9/13/17  Date of Discharge: 9/15  Reason(s) for Admission: Kidney stone             Problems taking medications regularly:  None       Medication changes since discharge: None       Problems adhering to non-medication therapy:  None    Summary of hospitalization:  [facility] discharge summary reviewed  Diagnostic Tests/Treatments reviewed.  Follow up needed: none  Other Healthcare Providers Involved in Patient s Care:         None  Update since discharge: stable.     Post Discharge Medication Reconciliation: discharge medications reconciled, continue medications without change.  Plan of care communicated with patient     Coding guidelines for this visit:  Type of Medical   Decision Making Face-to-Face Visit       within 7 Days of discharge Face-to-Face Visit        within 14 days of discharge   Moderate Complexity [id number]   High Complexity 13350 [id number]                  Problem list and histories reviewed & adjusted, as indicated.  Additional history: as documented    Patient Active Problem List   Diagnosis     Kidney stone     Past Surgical History:   Procedure Laterality Date     CYSTOSCOPY, RETROGRADES, INSERT STENT URETER(S), COMBINED Right 9/14/2017    Procedure: COMBINED CYSTOSCOPY, RETROGRADES, INSERT STENT URETER(S);  Cysto , right stent placement;  Surgeon: Pavel Lundy MD;  Location: [facility] OR       Social History   Substance Use Topics     Smoking status: Never Smoker     Smokeless tobacco: Never Used     Alcohol use Yes      Comment: on occasion     History  reviewed. No pertinent family history.      Current Outpatient Prescriptions   Medication Sig Dispense Refill     oxyCODONE (ROXICODONE) 5 MG IR tablet Take 1-2 tablets (5-10 mg) by mouth every 3 hours as needed for moderate to severe pain 15 tablet 0     ketorolac (TORADOL) 10 MG tablet Take 1 tablet (10 mg) by mouth every 6 hours as needed for moderate pain 20 tablet 0     acetaminophen (TYLENOL) 500 MG tablet Take 2 tablets (1,000 mg) by mouth every 6 hours as needed for mild pain or fever 1 Bottle 0     ondansetron (ZOFRAN-ODT) 4 MG ODT tab Take 1 tablet (4 mg) by mouth every 6 hours as needed for nausea or vomiting 15 tablet 0     ciprofloxacin (CIPRO) 500 MG tablet Take 1 tablet (500 mg) by mouth 2 times daily for 9 days 18 tablet 0     Lactobacillus-Inulin ([facility] DIGESTIVE [facility]) CAPS Take 1 tablet by mouth 2 times daily for 10 days 20 capsule 0     Allergies   Allergen Reactions     Penicillins Other (See Comments) and Rash     Vomiting as a infant     Labs reviewed in EPIC      Reviewed and updated as needed this visit by clinical staff       Reviewed and updated as needed this visit by Provider             Review of Systems   Constitutional: Negative for diaphoresis, fever, malaise/fatigue and weight loss.   HENT: Negative for congestion, ear discharge, ear pain, hearing loss, nosebleeds and sore throat.    Eyes: Negative for blurred vision, double vision, photophobia, pain, discharge and redness.   Respiratory: Negative for cough, hemoptysis, sputum production, shortness of breath and wheezing.    Cardiovascular: Negative for chest pain, palpitations, orthopnea and leg swelling.   Gastrointestinal: Negative for abdominal pain, blood in stool, constipation, diarrhea, heartburn, melena, nausea and vomiting.   Genitourinary: Negative.  Negative for dysuria, frequency and urgency.   Musculoskeletal: Positive for back pain. Negative for joint pain, myalgias and neck pain.   Skin: Negative for itching and  rash.   Neurological: Negative.  Negative for dizziness, tingling, sensory change, focal weakness, seizures, loss of consciousness, weakness and headaches.   Endo/Heme/Allergies: Negative.    Psychiatric/Behavioral: Negative for depression, hallucinations, substance abuse and suicidal ideas. The patient is not nervous/anxious and does not have insomnia.          Physical Exam   Constitutional: He is oriented to person, place, and time and well-developed, well-nourished, and in no distress.   HENT:   Head: Normocephalic and atraumatic.   Right Ear: External ear normal.   Left Ear: External ear normal.   Nose: Nose normal.   Mouth/Throat: Oropharynx is clear and moist.   Eyes: Conjunctivae and EOM are normal. Pupils are equal, round, and reactive to light. Right eye exhibits no discharge. Left eye exhibits no discharge. No scleral icterus.   Neck: Normal range of motion. Neck supple. No thyromegaly present.   Cardiovascular: Normal rate, regular rhythm, normal heart sounds and intact distal pulses.  Exam reveals no gallop and no friction rub.    No murmur heard.  Pulmonary/Chest: Effort normal and breath sounds normal. No respiratory distress. He has no wheezes. He has no rales. He exhibits no tenderness.   Abdominal: Soft. Bowel sounds are normal. He exhibits no distension and no mass. There is no tenderness. There is no rebound and no guarding.   Musculoskeletal: Normal range of motion. He exhibits no edema or tenderness.   Lymphadenopathy:     He has no cervical adenopathy.   Neurological: He is alert and oriented to person, place, and time. He has normal reflexes. No cranial nerve deficit. He exhibits normal muscle tone. Gait normal. Coordination normal.   Skin: Skin is warm and dry. No rash noted. No erythema.   Psychiatric: Mood, memory, affect and judgment normal.         (N20.1) Calculi, ureter  Comment:   Plan: oxyCODONE (ROXICODONE) 5 MG IR tablet,         ketorolac (TORADOL) 10 MG tablet           I  completed work-related forms and prescribed Toradol that he may alternate for pain instead of the oxycodone but a refill of oxycodone was also given. He will follow-up with urology and return to work next week if able.

## 2017-09-20 NOTE — MR AVS SNAPSHOT
After Visit Summary   9/20/2017    Santo Toure    MRN: [id number]           Patient Information     Date Of Birth          1980        Visit Information        Provider Department      9/20/2017 8:20 AM Rachel Best Steven L, CAROLIN [facility]        Today's Diagnoses     Calculi, ureter           Follow-ups after your visit        Follow-up notes from your care team     Return if symptoms worsen or fail to improve.      Who to contact     If you have questions or need follow up information about today's clinic visit or your schedule please contact [facility] directly at [phone].  Normal or non-critical lab and imaging results will be communicated to you by MyChart, letter or phone within 4 business days after the clinic has received the results. If you do not hear from us within 7 days, please contact the clinic through [employer] or phone. If you have a critical or abnormal lab result, we will notify you by phone as soon as possible.  Submit refill requests through [employer] or call your pharmacy and they will forward the refill request to us. Please allow 3 business days for your refill to be completed.          Additional Information About Your Visit        MyChart Information     [employer] gives you secure access to your electronic health record. If you see a primary care provider, you can also send messages to your care team and make appointments. If you have questions, please call your primary care clinic.  If you do not have a primary care provider, please call [phone] and they will assist you.        Care EveryWhere ID     This is your Care EveryWhere ID. This could be used by other organizations to access your [facility] medical records  [id number]        Your Vitals Were     Pulse Temperature BMI (Body Mass Index)             88 95.9  F (35.5  C) (Tympanic) 30.03 kg/m2          Blood Pressure from Last 3 Encounters:   09/20/17 124/84    09/15/17 117/72   09/13/17 138/88    Weight from Last 3 Encounters:   09/20/17 221 lb 6.4 oz (100.4 kg)   09/13/17 217 lb 2.5 oz (98.5 kg)   09/13/17 216 lb 6.4 oz (98.2 kg)              Today, you had the following     No orders found for display         Today's Medication Changes          These changes are accurate as of: 9/20/17  9:23 AM.  If you have any questions, ask your nurse or doctor.               Start taking these medicines.        Dose/Directions    ketorolac 10 MG tablet   Commonly known as:  TORADOL   Used for:  Calculi, ureter   Started by:  Charbel Cloud PA-C        Dose:  10 mg   Take 1 tablet (10 mg) by mouth every 6 hours as needed for moderate pain   Quantity:  20 tablet   Refills:  0            Where to get your medicines      These medications were sent to [facility] [address] [address]     Phone:  [phone]     ketorolac 10 MG tablet         Some of these will need a paper prescription and others can be bought over the counter.  Ask your nurse if you have questions.     Bring a paper prescription for each of these medications     oxyCODONE 5 MG IR tablet                Primary Care Provider    Unknown Primary MD Willie       No address on file        Equal Access to Services     [facility] ANJALI AH: Drew macario Sovalentin, waaxda luqadaha, qaybta kaalmada adeegyada, mookie rich. So [facility] [phone].    ATENCIÓN: Si habla español, tiene a marshall disposición servicios gratuitos de asistencia lingüística. Llame al [phone].    We comply with applicable federal civil rights laws and [place] laws. We do not discriminate on the basis of race, color, national origin, age, disability sex, sexual orientation or gender identity.            Thank you!     Thank you for choosing [facility]  for your care. Our goal is always to provide you with excellent care. Hearing  back from our patients is one way we can continue to improve our services. Please take a few minutes to complete the written survey that you may receive in the mail after your visit with us. Thank you!             Your Updated Medication List - Protect others around you: Learn how to safely use, store and throw away your medicines at www.disposemymeds.org.          This list is accurate as of: 9/20/17  9:23 AM.  Always use your most recent med list.                   Brand Name Dispense Instructions for use Diagnosis    acetaminophen 500 MG tablet    TYLENOL    1 Bottle    Take 2 tablets (1,000 mg) by mouth every 6 hours as needed for mild pain or fever    Calculi, ureter       ciprofloxacin 500 MG tablet    CIPRO    18 tablet    Take 1 tablet (500 mg) by mouth 2 times daily for 9 days    Acute pyelonephritis       CULTURELLE DIGESTIVE HEALTH Caps     20 capsule    Take 1 tablet by mouth 2 times daily for 10 days    Acute pyelonephritis       ketorolac 10 MG tablet    TORADOL    20 tablet    Take 1 tablet (10 mg) by mouth every 6 hours as needed for moderate pain    Calculi, ureter       ondansetron 4 MG ODT tab    ZOFRAN-ODT    15 tablet    Take 1 tablet (4 mg) by mouth every 6 hours as needed for nausea or vomiting    Calculi, ureter       oxyCODONE 5 MG IR tablet    ROXICODONE    15 tablet    Take 1-2 tablets (5-10 mg) by mouth every 3 hours as needed for moderate to severe pain    Calculi, ureter

## 2017-09-20 NOTE — LETTER
[facility]  [address] [place] [address]  Phone: [phone]  Fax: [phone]    September 20, 2017        Santo Toure  [address] [address] [address]          To whom it may concern:    RE: Santo Toure    Patient was seen and treated today at our clinic and missed work due to kidney stone/hospital admission and surgery. He will be off 9/13- 9/24/17 at minimum and will have further surgery in October. He will attempt to return to work 9/25/17    Please contact me for questions or concerns.      Sincerely,        Charbel Cloud PA-C

## 2017-09-20 NOTE — NURSING NOTE
Chief Complaint   Patient presents with     Hospital F/U       Initial /84 (BP Location: Right arm, Patient Position: Chair, Cuff Size: Adult Large)  Pulse 88  Temp 95.9  F (35.5  C) (Tympanic)  Wt 221 lb 6.4 oz (100.4 kg)  BMI 30.03 kg/m2 Estimated body mass index is 30.03 kg/(m^2) as calculated from the following:    Height as of 9/13/17: 6' (1.829 m).    Weight as of this encounter: 221 lb 6.4 oz (100.4 kg).  Medication Reconciliation: complete    Health Maintenance that is potentially due pending provider review:  NONE    n/a    Is there anyone who you would like to be able to receive your results? Yes  If yes have patient fill out HENRRY

## 2017-09-20 NOTE — TELEPHONE ENCOUNTER
Pre Op Teaching Flowsheet       Pre and Post op Patient Education  Relevant Diagnosis:  Kidney stone   Teaching Topic:  Pre and post op teaching  Person Involved in teaching:  Patient thru a tdy     Motivation Level:  Asks Questions: Yes  Eager to Learn:  Yes  Cooperative: Yes  Receptive (willing/able to accept information):  Yes  Patient demonstrates understanding of the following:  Date and time of surgery:  Oct 5 2017 @ 1500  [place] of [place]  History and Physical and any other testing necessary prior to surgery: Yes  Required time line for completion of History and Physical and any pre-op testing: Yes    NPO Guidelines: NPO per Anesthesia Guidelines    Patient demonstrates understanding of the following:  Pre-op bowel prep:  N/A  Pre-op showering/scrub information with PCMX Soap: Yes  Medications to take the day of surgery:  Per PCP  Blood thinner medications discussed and when to stop (if applicable):  Yes  Diabetes medication management (if applicable):  N/A  Discussed pain control after surgery: pain scale  Infection Prevention: Patient demonstrates understanding of the following:  Patient instructed on hand hygiene:  Yes  Surgical procedure site care taught: Yes  Signs and symptoms of infection taught:  Yes  Wound care will be taught at the time of discharge.  Central venous catheter care will be taught at the time of discharge (if applicable).    Post-op follow-up:  Discussed how to contact the hospital, nurse, and clinic scheduling staff if necessary.    Instructional materials used/given/mailed:  [place] [facility] Booklet, post op teaching sheet, Map, Soap, and arrival/location information.    Surgical instructions mailed to patient Yes.I also spoke to patient  tdy .  gutierrez vogt LPN    tdy

## 2017-09-26 ENCOUNTER — MYC REFILL (OUTPATIENT)
Dept: FAMILY MEDICINE | Facility: CLINIC | Age: 37
End: 2017-09-26

## 2017-09-26 DIAGNOSIS — N20.1 CALCULI, URETER: ICD-10-CM

## 2017-09-26 RX ORDER — OXYCODONE HYDROCHLORIDE 5 MG/1
5-10 TABLET ORAL
Qty: 15 TABLET | Refills: 0 | Status: SHIPPED | OUTPATIENT
Start: 2017-09-26 | End: 2017-10-02

## 2017-09-26 NOTE — TELEPHONE ENCOUNTER
Message from MyChart:  Original authorizing provider: CAROLIN Payton would like a refill of the following medications:  oxyCODONE (ROXICODONE) 5 MG IR tablet [Charbel Cloud PA-C]    Preferred pharmacy: [facility], [place] - [address] [place] [address]    Comment:  Still having pain and soon running out of meds. Can refill till my surgery?

## 2017-10-02 ENCOUNTER — ANESTHESIA EVENT (OUTPATIENT)
Dept: SURGERY | Facility: CLINIC | Age: 37
End: 2017-10-02
Payer: COMMERCIAL

## 2017-10-02 ENCOUNTER — MYC REFILL (OUTPATIENT)
Dept: FAMILY MEDICINE | Facility: CLINIC | Age: 37
End: 2017-10-02

## 2017-10-02 DIAGNOSIS — N20.1 CALCULI, URETER: ICD-10-CM

## 2017-10-02 RX ORDER — OXYCODONE HYDROCHLORIDE 5 MG/1
5-10 TABLET ORAL
Qty: 15 TABLET | Refills: 0 | Status: SHIPPED | OUTPATIENT
Start: 2017-10-02 | End: 2017-10-07

## 2017-10-02 NOTE — TELEPHONE ENCOUNTER
Message from MyChart:  Original authorizing provider: CAROLIN Payton would like a refill of the following medications:  oxyCODONE (ROXICODONE) 5 MG IR tablet [Charbel Cloud PA-C]    Preferred pharmacy: [facility], [place] - [address] [place] [address]    Comment:

## 2017-10-05 ENCOUNTER — APPOINTMENT (OUTPATIENT)
Dept: GENERAL RADIOLOGY | Facility: CLINIC | Age: 37
End: 2017-10-05
Attending: UROLOGY
Payer: COMMERCIAL

## 2017-10-05 ENCOUNTER — ANESTHESIA (OUTPATIENT)
Dept: SURGERY | Facility: CLINIC | Age: 37
End: 2017-10-05
Payer: COMMERCIAL

## 2017-10-05 ENCOUNTER — MYC MEDICAL ADVICE (OUTPATIENT)
Dept: FAMILY MEDICINE | Facility: CLINIC | Age: 37
End: 2017-10-05

## 2017-10-05 ENCOUNTER — HOSPITAL ENCOUNTER (OUTPATIENT)
Facility: CLINIC | Age: 37
Discharge: HOME | End: 2017-10-05
Attending: UROLOGY | Admitting: UROLOGY
Payer: COMMERCIAL

## 2017-10-05 VITALS
BODY MASS INDEX: 29.12 KG/M2 | TEMPERATURE: 97.9 F | SYSTOLIC BLOOD PRESSURE: 122 MMHG | OXYGEN SATURATION: 99 % | HEIGHT: 72 IN | RESPIRATION RATE: 10 BRPM | DIASTOLIC BLOOD PRESSURE: 75 MMHG | WEIGHT: 215 LBS

## 2017-10-05 DIAGNOSIS — N20.0 KIDNEY STONE: Primary | ICD-10-CM

## 2017-10-05 PROCEDURE — 25000125 ZZHC RX 250: Performed by: NURSE ANESTHETIST, CERTIFIED REGISTERED

## 2017-10-05 PROCEDURE — 27211027 ZZHC OR PVP LASER FIBER OPNP: Performed by: UROLOGY

## 2017-10-05 PROCEDURE — 37000008 ZZH ANESTHESIA TECHNICAL FEE, 1ST 30 MIN: Performed by: UROLOGY

## 2017-10-05 PROCEDURE — 25000128 H RX IP 250 OP 636: Performed by: NURSE ANESTHETIST, CERTIFIED REGISTERED

## 2017-10-05 PROCEDURE — C1769 GUIDE WIRE: HCPCS | Performed by: UROLOGY

## 2017-10-05 PROCEDURE — 37000009 ZZH ANESTHESIA TECHNICAL FEE, EACH ADDTL 15 MIN: Performed by: UROLOGY

## 2017-10-05 PROCEDURE — 40000305 ZZH STATISTIC PRE PROC ASSESS I: Performed by: UROLOGY

## 2017-10-05 PROCEDURE — 71000027 ZZH RECOVERY PHASE 2 EACH 15 MINS: Performed by: UROLOGY

## 2017-10-05 PROCEDURE — 52356 CYSTO/URETERO W/LITHOTRIPSY: CPT | Mod: RT | Performed by: UROLOGY

## 2017-10-05 PROCEDURE — 27210794 ZZH OR GENERAL SUPPLY STERILE: Performed by: UROLOGY

## 2017-10-05 PROCEDURE — 36000060 ZZH SURGERY LEVEL 3 W FLUORO 1ST 30 MIN: Performed by: UROLOGY

## 2017-10-05 PROCEDURE — 71000014 ZZH RECOVERY PHASE 1 LEVEL 2 FIRST HR: Performed by: UROLOGY

## 2017-10-05 PROCEDURE — 40000277 XR SURGERY CARM FLUORO LESS THAN 5 MIN W STILLS

## 2017-10-05 PROCEDURE — C2617 STENT, NON-COR, TEM W/O DEL: HCPCS | Performed by: UROLOGY

## 2017-10-05 PROCEDURE — S0077 INJECTION, CLINDAMYCIN PHOSP: HCPCS | Performed by: UROLOGY

## 2017-10-05 PROCEDURE — 82365 CALCULUS SPECTROSCOPY: CPT | Performed by: UROLOGY

## 2017-10-05 PROCEDURE — 36000058 ZZH SURGERY LEVEL 3 EA 15 ADDTL MIN: Performed by: UROLOGY

## 2017-10-05 PROCEDURE — 25000128 H RX IP 250 OP 636: Performed by: UROLOGY

## 2017-10-05 PROCEDURE — 25000125 ZZHC RX 250: Performed by: UROLOGY

## 2017-10-05 DEVICE — STENT URETERAL DBL PIGTAIL INLAY 6FRX26CM 778626: Type: IMPLANTABLE DEVICE | Site: URETER | Status: FUNCTIONAL

## 2017-10-05 RX ORDER — NALOXONE HYDROCHLORIDE 0.4 MG/ML
.1-.4 INJECTION, SOLUTION INTRAMUSCULAR; INTRAVENOUS; SUBCUTANEOUS
Status: DISCONTINUED | OUTPATIENT
Start: 2017-10-05 | End: 2017-10-05 | Stop reason: HOSPADM

## 2017-10-05 RX ORDER — FENTANYL CITRATE 50 UG/ML
INJECTION, SOLUTION INTRAMUSCULAR; INTRAVENOUS PRN
Status: DISCONTINUED | OUTPATIENT
Start: 2017-10-05 | End: 2017-10-05

## 2017-10-05 RX ORDER — OXYCODONE HYDROCHLORIDE 5 MG/1
5 TABLET ORAL EVERY 6 HOURS PRN
Qty: 10 TABLET | Refills: 0 | Status: SHIPPED | OUTPATIENT
Start: 2017-10-05 | End: 2018-02-07

## 2017-10-05 RX ORDER — ONDANSETRON 2 MG/ML
4 INJECTION INTRAMUSCULAR; INTRAVENOUS EVERY 30 MIN PRN
Status: DISCONTINUED | OUTPATIENT
Start: 2017-10-05 | End: 2017-10-05 | Stop reason: HOSPADM

## 2017-10-05 RX ORDER — SODIUM CHLORIDE, SODIUM LACTATE, POTASSIUM CHLORIDE, CALCIUM CHLORIDE 600; 310; 30; 20 MG/100ML; MG/100ML; MG/100ML; MG/100ML
INJECTION, SOLUTION INTRAVENOUS CONTINUOUS
Status: DISCONTINUED | OUTPATIENT
Start: 2017-10-05 | End: 2017-10-05 | Stop reason: HOSPADM

## 2017-10-05 RX ORDER — CLINDAMYCIN PHOSPHATE 900 MG/50ML
900 INJECTION, SOLUTION INTRAVENOUS SEE ADMIN INSTRUCTIONS
Status: DISCONTINUED | OUTPATIENT
Start: 2017-10-05 | End: 2017-10-05 | Stop reason: HOSPADM

## 2017-10-05 RX ORDER — KETOROLAC TROMETHAMINE 30 MG/ML
INJECTION, SOLUTION INTRAMUSCULAR; INTRAVENOUS PRN
Status: DISCONTINUED | OUTPATIENT
Start: 2017-10-05 | End: 2017-10-05

## 2017-10-05 RX ORDER — FENTANYL CITRATE 50 UG/ML
25-50 INJECTION, SOLUTION INTRAMUSCULAR; INTRAVENOUS
Status: DISCONTINUED | OUTPATIENT
Start: 2017-10-05 | End: 2017-10-05 | Stop reason: HOSPADM

## 2017-10-05 RX ORDER — LIDOCAINE HYDROCHLORIDE 10 MG/ML
INJECTION, SOLUTION INFILTRATION; PERINEURAL PRN
Status: DISCONTINUED | OUTPATIENT
Start: 2017-10-05 | End: 2017-10-05

## 2017-10-05 RX ORDER — ONDANSETRON 4 MG/1
4 TABLET, ORALLY DISINTEGRATING ORAL EVERY 30 MIN PRN
Status: DISCONTINUED | OUTPATIENT
Start: 2017-10-05 | End: 2017-10-05 | Stop reason: HOSPADM

## 2017-10-05 RX ORDER — PROPOFOL 10 MG/ML
INJECTION, EMULSION INTRAVENOUS CONTINUOUS PRN
Status: DISCONTINUED | OUTPATIENT
Start: 2017-10-05 | End: 2017-10-05

## 2017-10-05 RX ORDER — PROPOFOL 10 MG/ML
INJECTION, EMULSION INTRAVENOUS PRN
Status: DISCONTINUED | OUTPATIENT
Start: 2017-10-05 | End: 2017-10-05

## 2017-10-05 RX ORDER — CLINDAMYCIN PHOSPHATE 900 MG/50ML
900 INJECTION, SOLUTION INTRAVENOUS
Status: DISCONTINUED | OUTPATIENT
Start: 2017-10-05 | End: 2017-10-05 | Stop reason: HOSPADM

## 2017-10-05 RX ORDER — IOPAMIDOL 612 MG/ML
INJECTION, SOLUTION INTRAVASCULAR PRN
Status: DISCONTINUED | OUTPATIENT
Start: 2017-10-05 | End: 2017-10-05 | Stop reason: HOSPADM

## 2017-10-05 RX ORDER — ONDANSETRON 2 MG/ML
INJECTION INTRAMUSCULAR; INTRAVENOUS PRN
Status: DISCONTINUED | OUTPATIENT
Start: 2017-10-05 | End: 2017-10-05

## 2017-10-05 RX ORDER — LIDOCAINE 40 MG/G
CREAM TOPICAL
Status: DISCONTINUED | OUTPATIENT
Start: 2017-10-05 | End: 2017-10-05 | Stop reason: HOSPADM

## 2017-10-05 RX ORDER — SCOLOPAMINE TRANSDERMAL SYSTEM 1 MG/1
1 PATCH, EXTENDED RELEASE TRANSDERMAL
Status: DISCONTINUED | OUTPATIENT
Start: 2017-10-05 | End: 2017-10-05 | Stop reason: HOSPADM

## 2017-10-05 RX ORDER — DEXAMETHASONE SODIUM PHOSPHATE 4 MG/ML
INJECTION, SOLUTION INTRA-ARTICULAR; INTRALESIONAL; INTRAMUSCULAR; INTRAVENOUS; SOFT TISSUE PRN
Status: DISCONTINUED | OUTPATIENT
Start: 2017-10-05 | End: 2017-10-05

## 2017-10-05 RX ORDER — HYDROMORPHONE HYDROCHLORIDE 1 MG/ML
.3-.5 INJECTION, SOLUTION INTRAMUSCULAR; INTRAVENOUS; SUBCUTANEOUS EVERY 10 MIN PRN
Status: DISCONTINUED | OUTPATIENT
Start: 2017-10-05 | End: 2017-10-05 | Stop reason: HOSPADM

## 2017-10-05 RX ADMIN — ONDANSETRON 4 MG: 2 INJECTION INTRAMUSCULAR; INTRAVENOUS at 17:04

## 2017-10-05 RX ADMIN — PROPOFOL 100 MG: 10 INJECTION, EMULSION INTRAVENOUS at 16:08

## 2017-10-05 RX ADMIN — SODIUM CHLORIDE, POTASSIUM CHLORIDE, SODIUM LACTATE AND CALCIUM CHLORIDE: 600; 310; 30; 20 INJECTION, SOLUTION INTRAVENOUS at 16:30

## 2017-10-05 RX ADMIN — LIDOCAINE HYDROCHLORIDE 1 ML: 10 INJECTION, SOLUTION EPIDURAL; INFILTRATION; INTRACAUDAL; PERINEURAL at 14:54

## 2017-10-05 RX ADMIN — LIDOCAINE HYDROCHLORIDE 50 MG: 10 INJECTION, SOLUTION INFILTRATION; PERINEURAL at 16:08

## 2017-10-05 RX ADMIN — SODIUM CHLORIDE, POTASSIUM CHLORIDE, SODIUM LACTATE AND CALCIUM CHLORIDE: 600; 310; 30; 20 INJECTION, SOLUTION INTRAVENOUS at 14:54

## 2017-10-05 RX ADMIN — CLINDAMYCIN PHOSPHATE 900 MG: 18 INJECTION, SOLUTION INTRAVENOUS at 15:57

## 2017-10-05 RX ADMIN — KETOROLAC TROMETHAMINE 30 MG: 30 INJECTION, SOLUTION INTRAMUSCULAR at 17:04

## 2017-10-05 RX ADMIN — MIDAZOLAM HYDROCHLORIDE 2 MG: 1 INJECTION, SOLUTION INTRAMUSCULAR; INTRAVENOUS at 15:57

## 2017-10-05 RX ADMIN — DEXAMETHASONE SODIUM PHOSPHATE 4 MG: 4 INJECTION, SOLUTION INTRA-ARTICULAR; INTRALESIONAL; INTRAMUSCULAR; INTRAVENOUS; SOFT TISSUE at 15:57

## 2017-10-05 RX ADMIN — PROPOFOL 200 MCG/KG/MIN: 10 INJECTION, EMULSION INTRAVENOUS at 16:08

## 2017-10-05 RX ADMIN — FENTANYL CITRATE 100 MCG: 50 INJECTION, SOLUTION INTRAMUSCULAR; INTRAVENOUS at 16:08

## 2017-10-05 RX ADMIN — FENTANYL CITRATE 100 MCG: 50 INJECTION, SOLUTION INTRAMUSCULAR; INTRAVENOUS at 15:59

## 2017-10-05 RX ADMIN — DEXAMETHASONE SODIUM PHOSPHATE 4 MG: 4 INJECTION, SOLUTION INTRA-ARTICULAR; INTRALESIONAL; INTRAMUSCULAR; INTRAVENOUS; SOFT TISSUE at 17:04

## 2017-10-05 RX ADMIN — FENTANYL CITRATE 50 MCG: 50 INJECTION, SOLUTION INTRAMUSCULAR; INTRAVENOUS at 16:34

## 2017-10-05 NOTE — IP AVS SNAPSHOT
[facility] PreOP/Phase II    [address] [facility] [address]    Phone:  [phone]    Fax:  [phone]                                       After Visit Summary   10/5/2017    Santo Toure    MRN: [id number]           After Visit Summary Signature Page     I have received my discharge instructions, and my questions have been answered. I have discussed any challenges I see with this plan with the nurse or doctor.    ..........................................................................................................................................  Patient/Patient Representative Signature      ..........................................................................................................................................  Patient Representative Print Name and Relationship to Patient    ..................................................               ................................................  Date                                            Time    ..........................................................................................................................................  Reviewed by Signature/Title    ...................................................              ..............................................  Date                                                            Time

## 2017-10-05 NOTE — BRIEF OP NOTE
Pre-op Dx: right ureteral stone  Post-op Dx:same  Procedure:cystoscopy, right ureteroscopy, laser lithotripsy, stent exchange  EBL: 1 cc  Specimens: right ureteral stone  Drains: right 6 x 26 JJ stent  No complications.

## 2017-10-05 NOTE — DISCHARGE INSTRUCTIONS
Notify physician if fever/chills/sweats.  You may see blood in the urine while the stent is in place.                      Same Day Surgery Discharge Instructions  Special Precautions After Surgery - Adult    1. It is not unusual to feel lightheaded or faint, up to 24 hours after surgery or while taking pain medication.  If you have these symptoms; sit for a few minutes before standing and have someone assist you when getting up.  2. You should rest and relax for the next 24 hours and must have someone stay with you for at least 24 hours after your discharge.  3. DO NOT DRIVE any vehicle or operate mechanical equipment for 24 hours following the end of your surgery.  DO NOT DRIVE while taking narcotic pain medications that have been prescribed by your physician.  If you had a limb operated on, you must be able to use it fully to drive.  4. DO NOT drink alcoholic beverages for 24 hours following surgery or while taking prescription pain medication.  5. Drink clear liquids (apple juice, ginger ale, broth, 7-Up, etc.).  Progress to your regular diet as you feel able.  6. Any questions call your physician and do not make important decisions for 24 hours.    ACTIVITY  ? Rest today.  No activity or diet restrictions.  ? Resume activity as tolerated.  ? See printed discharge sheet.     INCISIONAL CARE  ? Be alert for signs of infection: burning urgency and hesitancy with voidingand/or elevated temperature.  Contact your doctor if these occur.        Call for an appointment to return to the clinic in 5-7 days.    Medications:  ? roxicodone:  Next dose: as needed.  ? Follow the instructions on the bottle.     Additional discharge instructions: None  __________________________________________________________________________________________________________________________________  IMPORTANT NUMBERS:    [facility] Main Number:  [phone], [phone]  Pharmacy:  [phone]  Same Day Surgery:  [phone], Monday - Friday  until 8:30 p.m.  Urgent Care:  [phone]  Emergency Room:  [phone]      [facility]:  [phone]                                                                             [facility]:  [phone] option 1  [facility]:  [phone]     [facility] Clinic:  [phone]   [facility]:  [phone]   Home Medical Equipment: [phone]  [facility]:  [phone]      Follow up next week in urology clinic for stent removal.

## 2017-10-05 NOTE — IP AVS SNAPSHOT
MRN:[id number]                      After Visit Summary   10/5/2017    Santo Toure    MRN: [id number]           Thank you!     Thank you for choosing [facility] for your care. Our goal is always to provide you with excellent care. Hearing back from our patients is one way we can continue to improve our services. Please take a few minutes to complete the written survey that you may receive in the mail after you visit with us. Thank you!        Patient Information     Date Of Birth          1980        About your hospital stay     You were admitted on:  October 5, 2017 You last received care in the:  [facility] PreOP/Phase II    You were discharged on:  October 5, 2017       Who to Call     For medical emergencies, please call 911.  For non-urgent questions about your medical care, please call your primary care provider or clinic, [phone]  For questions related to your surgery, please call your surgery clinic        Attending Provider     Provider Specialty    Pavel Lundy MD Urology       Primary Care Provider Office Phone # Fax #    [facility] [phone]      Your next 10 appointments already scheduled     Oct 11, 2017  2:00 PM CDT   CYSTO with Pavel Lundy MD   [facility]    [address] [address] [phone]              Further instructions from your care team       Notify physician if fever/chills/sweats.  You may see blood in the urine while the stent is in place.                      Same Day Surgery Discharge Instructions  Special Precautions After Surgery - Adult    1. It is not unusual to feel lightheaded or faint, up to 24 hours after surgery or while taking pain medication.  If you have these symptoms; sit for a few minutes before standing and have someone assist you when getting up.  2. You should rest and relax for the next 24 hours and must  have someone stay with you for at least 24 hours after your discharge.  3. DO NOT DRIVE any vehicle or operate mechanical equipment for 24 hours following the end of your surgery.  DO NOT DRIVE while taking narcotic pain medications that have been prescribed by your physician.  If you had a limb operated on, you must be able to use it fully to drive.  4. DO NOT drink alcoholic beverages for 24 hours following surgery or while taking prescription pain medication.  5. Drink clear liquids (apple juice, ginger ale, broth, 7-Up, etc.).  Progress to your regular diet as you feel able.  6. Any questions call your physician and do not make important decisions for 24 hours.    ACTIVITY  ? Rest today.  No activity or diet restrictions.  ? Resume activity as tolerated.  ? See printed discharge sheet.     INCISIONAL CARE  ? Be alert for signs of infection: burning urgency and hesitancy with voidingand/or elevated temperature.  Contact your doctor if these occur.        Call for an appointment to return to the clinic in 5-7 days.    Medications:  ? roxicodone:  Next dose: as needed.  ? Follow the instructions on the bottle.     Additional discharge instructions: None  __________________________________________________________________________________________________________________________________  IMPORTANT NUMBERS:    [facility] Main Number:  [phone], [phone]  Pharmacy:  [phone]  Same Day Surgery:  [phone], Monday - Friday until 8:30 p.m.  Urgent Care:  [phone]  Emergency Room:  [phone]      [facility]:  [phone]                                                                             [facility]:  [phone] option 1  [facility]:  [phone]     [facility] Clinic:  [phone]   Sur[facility] Specialty Clinic:  [phone]   Home Medical Equipment: [phone]  [facility]:  [phone]      Follow up next week in urology clinic for stent  removal.        Pending Results     No orders found from 10/3/2017 to 10/6/2017.            Admission Information     Date & Time Provider Department Dept. Phone    10/5/2017 Pavel Lundy MD [facility] PreOP/Phase [phone]      Your Vitals Were     Blood Pressure Temperature Respirations Height Weight Pulse Oximetry    133/80 97.9  F (36.6  C) (Oral) 16 1.829 m (6') 97.5 kg (215 lb) 99%    BMI (Body Mass Index)                   29.16 kg/m2           MyChart Information     [facility] gives you secure access to your electronic health record. If you see a primary care provider, you can also send messages to your care team and make appointments. If you have questions, please call your primary care clinic.  If you do not have a primary care provider, please call [phone] and they will assist you.        Care EveryWhere ID     This is your Care EveryWhere ID. This could be used by other organizations to access your [facility] medical records  [id number]        Equal Access to Services     ANTONINA ALBA : Hadii teo barrios hadasho Socareyali, waaxda luqadaha, qaybta kaalmada adeleonardoyajasper, mookie rich. So [facility] [phone].    ATENCIÓN: Si habla español, tiene a marshall disposición servicios gratuitos de asistencia lingüística. Llame al [phone].    We comply with applicable federal civil rights laws and [place] laws. We do not discriminate on the basis of race, color, national origin, age, disability, sex, sexual orientation, or gender identity.               Review of your medicines      CONTINUE these medicines which may have CHANGED, or have new prescriptions. If we are uncertain of the size of tablets/capsules you have at home, strength may be listed as something that might have changed.        Dose / Directions    * oxyCODONE 5 MG IR tablet   Commonly known as:  ROXICODONE   This may have changed:  Another medication with the same name was added. Make sure you  understand how and when to take each.   Used for:  Calculi, ureter        Dose:  5-10 mg   Take 1-2 tablets (5-10 mg) by mouth every 3 hours as needed for moderate to severe pain   Quantity:  15 tablet   Refills:  0       * oxyCODONE 5 MG IR tablet   Commonly known as:  ROXICODONE   This may have changed:  You were already taking a medication with the same name, and this prescription was added. Make sure you understand how and when to take each.   Used for:  Kidney stone        Dose:  5 mg   Take 1 tablet (5 mg) by mouth every 6 hours as needed for pain maximum 4 tablet(s) per day   Quantity:  10 tablet   Refills:  0       * Notice:  This list has 2 medication(s) that are the same as other medications prescribed for you. Read the directions carefully, and ask your doctor or other care provider to review them with you.      CONTINUE these medicines which have NOT CHANGED        Dose / Directions    acetaminophen 500 MG tablet   Commonly known as:  TYLENOL   Used for:  Calculi, ureter        Dose:  1000 mg   Take 2 tablets (1,000 mg) by mouth every 6 hours as needed for mild pain or fever   Quantity:  1 Bottle   Refills:  0       ketorolac 10 MG tablet   Commonly known as:  TORADOL   Used for:  Calculi, ureter        Dose:  10 mg   Take 1 tablet (10 mg) by mouth every 6 hours as needed for moderate pain   Quantity:  20 tablet   Refills:  0       ondansetron 4 MG ODT tab   Commonly known as:  ZOFRAN-ODT   Used for:  Calculi, ureter        Dose:  4 mg   Take 1 tablet (4 mg) by mouth every 6 hours as needed for nausea or vomiting   Quantity:  15 tablet   Refills:  0            Where to get your medicines      Some of these will need a paper prescription and others can be bought over the counter. Ask your nurse if you have questions.     Bring a paper prescription for each of these medications     oxyCODONE 5 MG IR tablet                Protect others around you: Learn how to safely use, store and throw away your  medicines at www.disposemymeds.org.             Medication List: This is a list of all your medications and when to take them. Check marks below indicate your daily home schedule. Keep this list as a reference.      Medications           Morning Afternoon Evening Bedtime As Needed    acetaminophen 500 MG tablet   Commonly known as:  TYLENOL   Take 2 tablets (1,000 mg) by mouth every 6 hours as needed for mild pain or fever                                ketorolac 10 MG tablet   Commonly known as:  TORADOL   Take 1 tablet (10 mg) by mouth every 6 hours as needed for moderate pain                                ondansetron 4 MG ODT tab   Commonly known as:  ZOFRAN-ODT   Take 1 tablet (4 mg) by mouth every 6 hours as needed for nausea or vomiting                                * oxyCODONE 5 MG IR tablet   Commonly known as:  ROXICODONE   Take 1-2 tablets (5-10 mg) by mouth every 3 hours as needed for moderate to severe pain                                * oxyCODONE 5 MG IR tablet   Commonly known as:  ROXICODONE   Take 1 tablet (5 mg) by mouth every 6 hours as needed for pain maximum 4 tablet(s) per day                                * Notice:  This list has 2 medication(s) that are the same as other medications prescribed for you. Read the directions carefully, and ask your doctor or other care provider to review them with you.

## 2017-10-05 NOTE — H&P (VIEW-ONLY)
CHIEF COMPLAINT:  Right groin pain.      HISTORY OF PRESENT ILLNESS:  The patient Santo Toure is a 37-year-old man has been in otherwise good health and was walking his dog when he experienced the sudden onset of severe colicky pain in the right low abdomen.  This pain was nonradiating, but came in waves.  It has been increasing since this morning despite pain medications in the Emergency Department.  Approximately 1 hour prior to admission to the Emergency Department he started having nausea and vomiting as well.      The patient denies urinary symptoms, urgency or frequency, no diarrhea, no fevers or chills and no cardiorespiratory symptoms at this time.      PAST MEDICAL HISTORY:   1.  Deafness.   2.  History of Meniere's disease as a child requiring multiple hospitalizations.   3.  Intermittent asthma.      [id number] MEDICATIONS:  lbuterol inhaler 2 puffs q.4h. p.r.n.      ALLERGIES:  Penicillins caused vomiting as an infant.      SOCIAL HISTORY:  The patient has never smoked, drinks rare alcohol.  He lives with his wife and children.      REVIEW OF SYSTEMS:  A 10-point review is negative except as above.      PHYSICAL EXAMINATION:   GENERAL:  The patient is awake, alert, oriented x3, deaf and using a .  He appears ill and retches and vomits intermittently.   HEENT:  Normal.   NECK:  Supple without masses, nodes or thyromegaly.   RESPIRATORY:  Lungs clear to A&P.   CARDIOVASCULAR:  Regular rate and rhythm without murmur, click or rub, pulses brisk and equal, no edema.   ABDOMEN:  Soft with tenderness in the right mid abdomen and specifically in the right lower quadrant.   BACK:  I cannot elicit much CVA tenderness.   EXTREMITIES:  Grossly normal.   NEUROLOGIC:  Grossly normal examination with good finger-nose, rapid alternating movements, good strength and sensation in the upper extremities, good strength and sensation in the lower extremities.      LABORATORY AND IMAGING  RESULTS:    1.  Urinalysis contains 26 white blood cells/hpf, 6 red blood cells/hpf, no nitrites.     2.  Basic metabolic profile generally normal.     3.  White count 18.5.     4.  CT scan of the abdomen and pelvis shows an obstructing proximal right ureteral stone    measuring 3 mm with a delayed right nephrogram and hydronephrosis with possible pyelonephritis changes.      ASSESSMENT/PLAN:      1.  Right 3 mm proximal ureteral stone.  The case was discussed by the Emergency Department Physician with Urology at the [facility].  They felt this did not represent an infected stone and recommended the following:   a.  Observation overnight.   b.  Urologic consultation in the a.m.     c.  We will watch for fevers.   d.  We will treat with IV fluids.   e.  We will treat with pain medications including morphine, oxycodone, Tylenol and Toradol.   f.  We will treat with nausea medications including Compazine and Zofran.     g.  We will give the patient 1 dose of Flomax if he can take it orally.     h.  The above therapy was discussed with the patient's wife and with the patient through the .   2.  Full code status.   3.  Deep vein thrombus prophylaxis.  None, patient is low risk.   4.  Deafness.     a.  The patient will need a  for interactions with Physicians, although his wife does sign.   5.  Disposition.     a.  He is Observation for now with no evidence of infection and pain reasonably controlled.   b.  He may be discharged in the a.m.         JAKY YEN MD             D: 2017 20:46   T: 2017 22:39   MT: [id number]      Name:     SARAH VILLELA   MRN:      [id number]        Account:      [id number]   :      1980           Admitted:     [id number]      Document: [id number]

## 2017-10-05 NOTE — ANESTHESIA CARE TRANSFER NOTE
Patient: Santo Toure    Procedure(s):  Cystoscopy, right ureteroscopy, laser lithotripsy, stent exchange - Wound Class: II-Clean Contaminated    Diagnosis: right ureteral stone  Diagnosis Additional Information: No value filed.    Anesthesia Type:   No value filed.     Note:  Airway :Nasal Cannula  Patient transferred to:PACU  Comments: Patient's VSS. Spontaneous respirations. Patient awake and oriented. IV patent. Report to RN.      Vitals: (Last set prior to Anesthesia Care Transfer)    CRNA VITALS  10/5/2017 1644 - 10/5/2017 1725      10/5/2017             Pulse: 64    SpO2: 99 %    Resp Rate (observed): (!)  7                Electronically Signed By: ROSSY Shin CRNA  October 5, 2017  5:25 PM

## 2017-10-05 NOTE — ANESTHESIA PREPROCEDURE EVALUATION
Anesthesia Evaluation     . Pt has had prior anesthetic. Type: MAC    History of anesthetic complications   - PONV        ROS/MED HX    ENT/Pulmonary:     (+)Intermittent asthma Treatment: Inhaler prn,  , . .    Neurologic: Comment: Deaf       Cardiovascular:  - neg cardiovascular ROS       METS/Exercise Tolerance:     Hematologic:  - neg hematologic  ROS       Musculoskeletal:  - neg musculoskeletal ROS       GI/Hepatic:  - neg GI/hepatic ROS       Renal/Genitourinary:     (+) Nephrolithiasis ,       Endo:  - neg endo ROS       Psychiatric:  - neg psychiatric ROS       Infectious Disease:  - neg infectious disease ROS       Malignancy:      - no malignancy   Other:                     Physical Exam  Normal systems: cardiovascular, pulmonary and dental    Airway   Mallampati: II  TM distance: >3 FB  Neck ROM: full    Dental     Cardiovascular       Pulmonary                     Anesthesia Plan      History & Physical Review  History and physical reviewed and following examination; no interval change.    ASA Status:  2 .    NPO Status:  > 6 hours (last drink at 1000)    Plan for General and LMA with Intravenous and Propofol induction. Maintenance will be TIVA.    PONV prophylaxis:  Ondansetron (or other 5HT-3), Dexamethasone or Solumedrol and Scopolamine patch       Postoperative Care  Postoperative pain management:  IV analgesics.      Consents  Anesthetic plan, risks, benefits and alternatives discussed with:  Patient..                          .

## 2017-10-05 NOTE — H&P (VIEW-ONLY)
HPI    SUBJECTIVE:   Santo Toure is a 37 year old male who presents to clinic today for follow-up after hospitalization and surgical intervention related to a kidney stone/ureter stone. A stent was placed and he was placed on antibiotics as well. He will have another procedure in a couple of more weeks.        Hospital Follow-up Visit:    Hospital/Nursing Home/IP Rehab Facility: [facility]  Date of Admission: 9/13/17  Date of Discharge: 9/15  Reason(s) for Admission: Kidney stone             Problems taking medications regularly:  None       Medication changes since discharge: None       Problems adhering to non-medication therapy:  None    Summary of hospitalization:  [facility] discharge summary reviewed  Diagnostic Tests/Treatments reviewed.  Follow up needed: none  Other Healthcare Providers Involved in Patient s Care:         None  Update since discharge: stable.     Post Discharge Medication Reconciliation: discharge medications reconciled, continue medications without change.  Plan of care communicated with patient     Coding guidelines for this visit:  Type of Medical   Decision Making Face-to-Face Visit       within 7 Days of discharge Face-to-Face Visit        within 14 days of discharge   Moderate Complexity [id number]   High Complexity [id number]                  Problem list and histories reviewed & adjusted, as indicated.  Additional history: as documented    Patient Active Problem List   Diagnosis     Kidney stone     Past Surgical History:   Procedure Laterality Date     CYSTOSCOPY, RETROGRADES, INSERT STENT URETER(S), COMBINED Right 9/14/2017    Procedure: COMBINED CYSTOSCOPY, RETROGRADES, INSERT STENT URETER(S);  Cysto , right stent placement;  Surgeon: Pavel Lundy MD;  Location: [facility] OR       Social History   Substance Use Topics     Smoking status: Never Smoker     Smokeless tobacco: Never Used     Alcohol use Yes      Comment: on occasion     History  reviewed. No pertinent family history.      Current Outpatient Prescriptions   Medication Sig Dispense Refill     oxyCODONE (ROXICODONE) 5 MG IR tablet Take 1-2 tablets (5-10 mg) by mouth every 3 hours as needed for moderate to severe pain 15 tablet 0     ketorolac (TORADOL) 10 MG tablet Take 1 tablet (10 mg) by mouth every 6 hours as needed for moderate pain 20 tablet 0     acetaminophen (TYLENOL) 500 MG tablet Take 2 tablets (1,000 mg) by mouth every 6 hours as needed for mild pain or fever 1 Bottle 0     ondansetron (ZOFRAN-ODT) 4 MG ODT tab Take 1 tablet (4 mg) by mouth every 6 hours as needed for nausea or vomiting 15 tablet 0     ciprofloxacin (CIPRO) 500 MG tablet Take 1 tablet (500 mg) by mouth 2 times daily for 9 days 18 tablet 0     Lactobacillus-Inulin ([facility] DIGESTIVE [facility]) CAPS Take 1 tablet by mouth 2 times daily for 10 days 20 capsule 0     Allergies   Allergen Reactions     Penicillins Other (See Comments) and Rash     Vomiting as a infant     Labs reviewed in EPIC      Reviewed and updated as needed this visit by clinical staff       Reviewed and updated as needed this visit by Provider             Review of Systems   Constitutional: Negative for diaphoresis, fever, malaise/fatigue and weight loss.   HENT: Negative for congestion, ear discharge, ear pain, hearing loss, nosebleeds and sore throat.    Eyes: Negative for blurred vision, double vision, photophobia, pain, discharge and redness.   Respiratory: Negative for cough, hemoptysis, sputum production, shortness of breath and wheezing.    Cardiovascular: Negative for chest pain, palpitations, orthopnea and leg swelling.   Gastrointestinal: Negative for abdominal pain, blood in stool, constipation, diarrhea, heartburn, melena, nausea and vomiting.   Genitourinary: Negative.  Negative for dysuria, frequency and urgency.   Musculoskeletal: Positive for back pain. Negative for joint pain, myalgias and neck pain.   Skin: Negative for itching and  rash.   Neurological: Negative.  Negative for dizziness, tingling, sensory change, focal weakness, seizures, loss of consciousness, weakness and headaches.   Endo/Heme/Allergies: Negative.    Psychiatric/Behavioral: Negative for depression, hallucinations, substance abuse and suicidal ideas. The patient is not nervous/anxious and does not have insomnia.          Physical Exam   Constitutional: He is oriented to person, place, and time and well-developed, well-nourished, and in no distress.   HENT:   Head: Normocephalic and atraumatic.   Right Ear: External ear normal.   Left Ear: External ear normal.   Nose: Nose normal.   Mouth/Throat: Oropharynx is clear and moist.   Eyes: Conjunctivae and EOM are normal. Pupils are equal, round, and reactive to light. Right eye exhibits no discharge. Left eye exhibits no discharge. No scleral icterus.   Neck: Normal range of motion. Neck supple. No thyromegaly present.   Cardiovascular: Normal rate, regular rhythm, normal heart sounds and intact distal pulses.  Exam reveals no gallop and no friction rub.    No murmur heard.  Pulmonary/Chest: Effort normal and breath sounds normal. No respiratory distress. He has no wheezes. He has no rales. He exhibits no tenderness.   Abdominal: Soft. Bowel sounds are normal. He exhibits no distension and no mass. There is no tenderness. There is no rebound and no guarding.   Musculoskeletal: Normal range of motion. He exhibits no edema or tenderness.   Lymphadenopathy:     He has no cervical adenopathy.   Neurological: He is alert and oriented to person, place, and time. He has normal reflexes. No cranial nerve deficit. He exhibits normal muscle tone. Gait normal. Coordination normal.   Skin: Skin is warm and dry. No rash noted. No erythema.   Psychiatric: Mood, memory, affect and judgment normal.         (N20.1) Calculi, ureter  Comment:   Plan: oxyCODONE (ROXICODONE) 5 MG IR tablet,         ketorolac (TORADOL) 10 MG tablet           I  completed work-related forms and prescribed Toradol that he may alternate for pain instead of the oxycodone but a refill of oxycodone was also given. He will follow-up with urology and return to work next week if able.

## 2017-10-06 ENCOUNTER — MYC MEDICAL ADVICE (OUTPATIENT)
Dept: UROLOGY | Facility: CLINIC | Age: 37
End: 2017-10-06

## 2017-10-06 ENCOUNTER — MYC MEDICAL ADVICE (OUTPATIENT)
Dept: FAMILY MEDICINE | Facility: CLINIC | Age: 37
End: 2017-10-06

## 2017-10-06 ENCOUNTER — OFFICE VISIT (OUTPATIENT)
Dept: FAMILY MEDICINE | Facility: CLINIC | Age: 37
End: 2017-10-06
Payer: COMMERCIAL

## 2017-10-06 VITALS
BODY MASS INDEX: 30.71 KG/M2 | HEART RATE: 124 BPM | DIASTOLIC BLOOD PRESSURE: 70 MMHG | SYSTOLIC BLOOD PRESSURE: 126 MMHG | WEIGHT: 226.4 LBS

## 2017-10-06 DIAGNOSIS — N20.1 CALCULUS OF URETER: Primary | ICD-10-CM

## 2017-10-06 PROCEDURE — 99214 OFFICE O/P EST MOD 30 MIN: CPT | Performed by: PHYSICIAN ASSISTANT

## 2017-10-06 ASSESSMENT — ENCOUNTER SYMPTOMS
DIARRHEA: 0
PALPITATIONS: 0
EYE REDNESS: 0
SHORTNESS OF BREATH: 0
MYALGIAS: 0
NERVOUS/ANXIOUS: 0
LOSS OF CONSCIOUSNESS: 0
NEUROLOGICAL NEGATIVE: 1
DOUBLE VISION: 0
NAUSEA: 0
EYE PAIN: 0
WEIGHT LOSS: 0
PHOTOPHOBIA: 0
HEMOPTYSIS: 0
DEPRESSION: 0
SEIZURES: 0
VOMITING: 0
NECK PAIN: 0
ABDOMINAL PAIN: 1
FREQUENCY: 0
SENSORY CHANGE: 0
WHEEZING: 0
SPUTUM PRODUCTION: 0
BLURRED VISION: 0
TINGLING: 0
WEAKNESS: 0
CONSTIPATION: 0
SORE THROAT: 0
DYSURIA: 0
DIAPHORESIS: 0
HEADACHES: 0
FEVER: 0
INSOMNIA: 0
EYE DISCHARGE: 0
BACK PAIN: 0
BLOOD IN STOOL: 0
ORTHOPNEA: 0
COUGH: 0
HALLUCINATIONS: 0
FOCAL WEAKNESS: 0
HEARTBURN: 0
DIZZINESS: 0

## 2017-10-06 ASSESSMENT — LIFESTYLE VARIABLES: SUBSTANCE_ABUSE: 0

## 2017-10-06 NOTE — ANESTHESIA POSTPROCEDURE EVALUATION
Patient: Santo Toure    Procedure(s):  Cystoscopy, right ureteroscopy, laser lithotripsy, stent exchange - Wound Class: II-Clean Contaminated    Diagnosis:right ureteral stone  Diagnosis Additional Information: No value filed.    Anesthesia Type:  No value filed.    Note:  Anesthesia Post Evaluation    Patient location during evaluation: Phase 2  Patient participation: Able to fully participate in evaluation  Level of consciousness: awake and alert  Pain management: adequate  Airway patency: patent  Cardiovascular status: stable  Respiratory status: room air  Hydration status: stable  PONV: none     Anesthetic complications: None          Last vitals:  Vitals:    10/05/17 1836 10/05/17 1900 10/05/17 1932   BP:  114/76 122/75   Resp:  15 10   Temp:      SpO2: 99% 99%          Electronically Signed By: ROSSY Joseph CRNA  October 5, 2017  9:07 PM

## 2017-10-06 NOTE — NURSING NOTE
Chief Complaint   Patient presents with     Forms       Initial /70 (BP Location: Right arm, Patient Position: Chair, Cuff Size: Adult Large)  Pulse 124  Wt 226 lb 6.4 oz (102.7 kg)  BMI 30.71 kg/m2 Estimated body mass index is 30.71 kg/(m^2) as calculated from the following:    Height as of 10/5/17: 6' (1.829 m).    Weight as of this encounter: 226 lb 6.4 oz (102.7 kg).  Medication Reconciliation: complete    Health Maintenance that is potentially due pending provider review:  NONE    n/a    Is there anyone who you would like to be able to receive your results? No  If yes have patient fill out HENRRY VELASQUEZ CMA

## 2017-10-06 NOTE — LETTER
October 6, 2017      Santo Toure  [address] [address]        To Whom It May Concern:    Santo Toure was seen in our clinic. He may return to work on 10/12/17 without restrictions. If improvement continues.       Sincerely,        Charbel Cloud PA-C

## 2017-10-06 NOTE — PROGRESS NOTES
HPI    SUBJECTIVE:   Santo Toure is a 37 year old male who presents to clinic today for the following health issues:    Paperwork-Patient states that he had surgery yesterday and needed forms filled out. By the time he woke up the dr was gone.       Problem list and histories reviewed & adjusted, as indicated.  Additional history: as documented    Patient Active Problem List   Diagnosis     Kidney stone     Past Surgical History:   Procedure Laterality Date     CYSTOSCOPY, RETROGRADES, INSERT STENT URETER(S), COMBINED Right 9/14/2017    Procedure: COMBINED CYSTOSCOPY, RETROGRADES, INSERT STENT URETER(S);  Cysto , right stent placement;  Surgeon: Pavel Lundy MD;  Location: [facility]     LASER HOLMIUM LITHOTRIPSY URETER(S), INSERT STENT, COMBINED Right 10/5/2017    Procedure: COMBINED CYSTOSCOPY, URETEROSCOPY, LASER HOLMIUM LITHOTRIPSY URETER(S), INSERT STENT;  Cystoscopy, right ureteroscopy, laser lithotripsy, stent exchange;  Surgeon: Pavel Lundy MD;  Location: [facility]       Social History   Substance Use Topics     Smoking status: Never Smoker     Smokeless tobacco: Never Used     Alcohol use Yes      Comment: on occasion     History reviewed. No pertinent family history.      Current Outpatient Prescriptions   Medication Sig Dispense Refill     oxyCODONE (ROXICODONE) 5 MG IR tablet Take 1 tablet (5 mg) by mouth every 6 hours as needed for pain maximum 4 tablet(s) per day 10 tablet 0     oxyCODONE (ROXICODONE) 5 MG IR tablet Take 1-2 tablets (5-10 mg) by mouth every 3 hours as needed for moderate to severe pain 15 tablet 0     ketorolac (TORADOL) 10 MG tablet Take 1 tablet (10 mg) by mouth every 6 hours as needed for moderate pain 20 tablet 0     acetaminophen (TYLENOL) 500 MG tablet Take 2 tablets (1,000 mg) by mouth every 6 hours as needed for mild pain or fever 1 Bottle 0     ondansetron (ZOFRAN-ODT) 4 MG ODT tab Take 1 tablet (4 mg) by mouth every 6 hours as needed for nausea or  vomiting 15 tablet 0     Allergies   Allergen Reactions     Penicillins Other (See Comments) and Rash     Vomiting as a infant     Labs reviewed in EPIC      Reviewed and updated as needed this visit by clinical staff       Reviewed and updated as needed this visit by Provider             Review of Systems   Constitutional: Negative for diaphoresis, fever, malaise/fatigue and weight loss.   HENT: Negative for congestion, ear discharge, ear pain, hearing loss, nosebleeds and sore throat.    Eyes: Negative for blurred vision, double vision, photophobia, pain, discharge and redness.   Respiratory: Negative for cough, hemoptysis, sputum production, shortness of breath and wheezing.    Cardiovascular: Negative for chest pain, palpitations, orthopnea and leg swelling.   Gastrointestinal: Positive for abdominal pain. Negative for blood in stool, constipation, diarrhea, heartburn, melena, nausea and vomiting.   Genitourinary: Negative.  Negative for dysuria, frequency and urgency.   Musculoskeletal: Negative for back pain, joint pain, myalgias and neck pain.   Skin: Negative for itching and rash.   Neurological: Negative.  Negative for dizziness, tingling, sensory change, focal weakness, seizures, loss of consciousness, weakness and headaches.   Endo/Heme/Allergies: Negative.    Psychiatric/Behavioral: Negative for depression, hallucinations, substance abuse and suicidal ideas. The patient is not nervous/anxious and does not have insomnia.          Physical Exam   Constitutional: He is oriented to person, place, and time and well-developed, well-nourished, and in no distress.   HENT:   Head: Normocephalic and atraumatic.   Right Ear: External ear normal.   Left Ear: External ear normal.   Nose: Nose normal.   Mouth/Throat: Oropharynx is clear and moist.   Eyes: Conjunctivae and EOM are normal. Pupils are equal, round, and reactive to light. Right eye exhibits no discharge. Left eye exhibits no discharge. No scleral icterus.    Neck: Normal range of motion. Neck supple. No thyromegaly present.   Cardiovascular: Normal rate, regular rhythm, normal heart sounds and intact distal pulses.  Exam reveals no gallop and no friction rub.    No murmur heard.  Pulmonary/Chest: Effort normal and breath sounds normal. No respiratory distress. He has no wheezes. He has no rales. He exhibits no tenderness.   Abdominal: Soft. Bowel sounds are normal. He exhibits no distension and no mass. There is no hepatosplenomegaly. There is no tenderness. There is CVA tenderness. There is no rebound and no guarding.   Flank pain and CVA tenderness still present   Musculoskeletal: Normal range of motion. He exhibits no edema or tenderness.   Lymphadenopathy:     He has no cervical adenopathy.   Neurological: He is alert and oriented to person, place, and time. He has normal reflexes. No cranial nerve deficit. He exhibits normal muscle tone. Gait normal. Coordination normal.   Skin: Skin is warm and dry. No rash noted. No erythema.   Psychiatric: Mood, memory, affect and judgment normal.       (N20.1) Calculus of ureter  (primary encounter diagnosis)  Comment:   Plan:    Forms were completed and he will return to work next Thursday if symptoms improve.

## 2017-10-06 NOTE — MR AVS SNAPSHOT
After Visit Summary   10/6/2017    Santo Toure    MRN: [id number]           Patient Information     Date Of Birth          1980        Visit Information        Provider Department      10/6/2017 2:00 PM Charbel Cloud PA-C; [facility]        Today's Diagnoses     Calculus of ureter    -  1       Follow-ups after your visit        Follow-up notes from your care team     Return if symptoms worsen or fail to improve.      Your next 10 appointments already scheduled     Oct 11, 2017  2:00 PM CDT   CYSTO with Pavel Lundy MD   [facility] ([facility]    [address] [address] [phone]              Who to contact     If you have questions or need follow up information about today's clinic visit or your schedule please contact [facility] directly at [phone].  Normal or non-critical lab and imaging results will be communicated to you by MyChart, letter or phone within 4 business days after the clinic has received the results. If you do not hear from us within 7 days, please contact the clinic through [employer] or phone. If you have a critical or abnormal lab result, we will notify you by phone as soon as possible.  Submit refill requests through [employer] or call your pharmacy and they will forward the refill request to us. Please allow 3 business days for your refill to be completed.          Additional Information About Your Visit        MyChart Information     [employer] gives you secure access to your electronic health record. If you see a primary care provider, you can also send messages to your care team and make appointments. If you have questions, please call your primary care clinic.  If you do not have a primary care provider, please call [phone] and they will assist you.        Care EveryWhere ID     This is your Care EveryWhere ID. This could be used by other  organizations to access your [place] medical records  [id number]        Your Vitals Were     Pulse BMI (Body Mass Index)                124 30.71 kg/m2           Blood Pressure from Last 3 Encounters:   10/06/17 126/70   10/05/17 122/75   09/20/17 124/84    Weight from Last 3 Encounters:   10/06/17 226 lb 6.4 oz (102.7 kg)   10/05/17 215 lb (97.5 kg)   09/20/17 221 lb 6.4 oz (100.4 kg)              Today, you had the following     No orders found for display       Primary Care Provider Office Phone # Fax #    [facility] [phone] [address] [place] [address]        Equal Access to Services     ANTONINA ALBA : Drew Victoria, waaxda luqadaha, qaybta kaalmada adeegyada, mookie rich. So [facility] [phone].    ATENCIÓN: Si habla español, tiene a marshall disposición servicios gratuitos de asistencia lingüística. Llame al [phone].    We comply with applicable federal civil rights laws and [place] laws. We do not discriminate on the basis of race, color, national origin, age, disability, sex, sexual orientation, or gender identity.            Thank you!     Thank you for choosing [facility]  for your care. Our goal is always to provide you with excellent care. Hearing back from our patients is one way we can continue to improve our services. Please take a few minutes to complete the written survey that you may receive in the mail after your visit with us. Thank you!             Your Updated Medication List - Protect others around you: Learn how to safely use, store and throw away your medicines at www.disposemymeds.org.          This list is accurate as of: 10/6/17  3:01 PM.  Always use your most recent med list.                   Brand Name Dispense Instructions for use Diagnosis    acetaminophen 500 MG tablet    TYLENOL    1 Bottle    Take 2 tablets (1,000 mg) by mouth every 6 hours as needed for mild  pain or fever    Calculi, ureter       ketorolac 10 MG tablet    TORADOL    20 tablet    Take 1 tablet (10 mg) by mouth every 6 hours as needed for moderate pain    Calculi, ureter       ondansetron 4 MG ODT tab    ZOFRAN-ODT    15 tablet    Take 1 tablet (4 mg) by mouth every 6 hours as needed for nausea or vomiting    Calculi, ureter       * oxyCODONE 5 MG IR tablet    ROXICODONE    15 tablet    Take 1-2 tablets (5-10 mg) by mouth every 3 hours as needed for moderate to severe pain    Calculi, ureter       * oxyCODONE 5 MG IR tablet    ROXICODONE    10 tablet    Take 1 tablet (5 mg) by mouth every 6 hours as needed for pain maximum 4 tablet(s) per day    Kidney stone       * Notice:  This list has 2 medication(s) that are the same as other medications prescribed for you. Read the directions carefully, and ask your doctor or other care provider to review them with you.

## 2017-10-06 NOTE — OP NOTE
DATE OF SERVICE:  10/05/2017.      PREOPERATIVE DIAGNOSIS:  Right ureteral stone.      POSTOPERATIVE DIAGNOSIS:  Right ureteral stone.      PROCEDURES:  Cystoscopy, right ureteroscopy, laser lithotripsy, stone basketing and extraction, stent exchange, fluoroscopy and interpretation of fluoroscopic images.      INDICATIONS:  Mr. Santo Toure is a 37-year-old gentleman followed in our clinic for an obstructing right ureteral stone.  The patient underwent stenting for the obstructing stone previously.  He presents today for definitive management of the stone.      DESCRIPTION OF PROCEDURE:  After informed consent was obtained, the patient was brought to the operating room where he was administered general anesthesia with LMA.  After a suitable level of anesthesia was attained, he was placed in lithotomy position with all pressure points padded.  He was administered preoperative antibiotics.  He was prepped and draped in standard sterile fashion.  Next, a 22-Swazi cystoscope was introduced into the patient's bladder without difficulty.  The stent could be seen emanating from the right UO.  This was secured with a grasper and brought to the urethral meatus.  The stent was then intubated with a sensor wire and this was advanced up to the kidney under fluoroscopic guidance.  We then used a semirigid ureteroscope to perform ureteroscopy alongside the wire.  We were able to perform ureteroscopy up into the proximal ureter, but no stone was seen.  We then placed a Super Stiff wire through the ureteroscope and advanced this up to the kidney under fluoroscopic guidance and then backed out the ureteroscope.  We then backloaded a flexible ureteroscope over the sensor guidewire and advanced this up to the kidney under fluoroscopic guidance.  Inspection of the kidney revealed a stone in the lower pole which was the ureteral stone which had been pushed up to the renal pelvis.  We shot a retrograde pyelogram from the UPJ and  outlined all the calices and performed a systematic pyeloscopy and confirmed there were no further stones in the kidney.  We then basketed the single stone and brought it down the ureter to the distal ureter, perhaps 3 cm above the UVJ.  There was a spot of narrowing in the ureter at this location and we were unable to bring the stone through this location.  We therefore used the 200-micron Holmium laser fiber to fragment the stone in this location into a couple of tiny fragments off the main stone, which then allowed the stone to pass through the area of narrowing.  The stone was brought out of the body and sent for stone analysis.  We then backloaded the cystoscope over the remaining wire and placed a 6 x 26 double-J stent over the remaining wire.  The proximal curl was confirmed by fluoroscopy, distal curl confirmed by direct vision.  The patient's bladder was drained.  He was awakened and brought to recovery room in stable condition.      SURGEON:  Steve Lundy MD      ESTIMATED BLOOD LOSS:  1 mL.      COMPLICATIONS:  There were none immediately noted.      SPECIMEN:  Includes right ureteral stone.      DRAINS:  Include a right 6 x 26 double-J stent.         STEVE LNUDY MD             D: 10/05/2017 17:06   T: 10/06/2017 11:12   MT: TINA      Name:     SARAH VILLELA   MRN:      [id number]        Account:        [id number]   :      1980           Procedure Date: 10/05/2017      Document: [id number]       cc: Steve Lundy MD

## 2017-10-07 ENCOUNTER — APPOINTMENT (OUTPATIENT)
Dept: CT IMAGING | Facility: CLINIC | Age: 37
End: 2017-10-07
Attending: EMERGENCY MEDICINE
Payer: COMMERCIAL

## 2017-10-07 ENCOUNTER — HOSPITAL ENCOUNTER (OUTPATIENT)
Facility: CLINIC | Age: 37
Setting detail: OBSERVATION
Discharge: HOME | End: 2017-10-09
Attending: EMERGENCY MEDICINE | Admitting: INTERNAL MEDICINE
Payer: COMMERCIAL

## 2017-10-07 DIAGNOSIS — D72.829 LEUKOCYTOSIS, UNSPECIFIED TYPE: ICD-10-CM

## 2017-10-07 DIAGNOSIS — N12 PYELONEPHRITIS: ICD-10-CM

## 2017-10-07 DIAGNOSIS — N39.0 URINARY TRACT INFECTION IN MALE: ICD-10-CM

## 2017-10-07 DIAGNOSIS — N39.0 COMPLICATED UTI (URINARY TRACT INFECTION): Primary | ICD-10-CM

## 2017-10-07 DIAGNOSIS — N20.0 KIDNEY STONE: ICD-10-CM

## 2017-10-07 LAB
ALBUMIN SERPL-MCNC: 3.1 G/DL (ref 3.4–5)
ALBUMIN UR-MCNC: 30 MG/DL
ALP SERPL-CCNC: 61 U/L (ref 40–150)
ALT SERPL W P-5'-P-CCNC: 27 U/L (ref 0–70)
ANION GAP SERPL CALCULATED.3IONS-SCNC: 9 MMOL/L (ref 3–14)
APPEARANCE UR: ABNORMAL
AST SERPL W P-5'-P-CCNC: 10 U/L (ref 0–45)
BACTERIA #/AREA URNS HPF: ABNORMAL /HPF
BASOPHILS # BLD AUTO: 0 10E9/L (ref 0–0.2)
BASOPHILS NFR BLD AUTO: 0 %
BILIRUB SERPL-MCNC: 0.7 MG/DL (ref 0.2–1.3)
BILIRUB UR QL STRIP: NEGATIVE
BUN SERPL-MCNC: 13 MG/DL (ref 7–30)
CALCIUM SERPL-MCNC: 8.7 MG/DL (ref 8.5–10.1)
CHLORIDE SERPL-SCNC: 102 MMOL/L (ref 94–109)
CO2 SERPL-SCNC: 27 MMOL/L (ref 20–32)
COLOR UR AUTO: YELLOW
CREAT SERPL-MCNC: 0.84 MG/DL (ref 0.66–1.25)
DIFFERENTIAL METHOD BLD: ABNORMAL
EOSINOPHIL # BLD AUTO: 0 10E9/L (ref 0–0.7)
EOSINOPHIL NFR BLD AUTO: 0 %
ERYTHROCYTE [DISTWIDTH] IN BLOOD BY AUTOMATED COUNT: 13.5 % (ref 10–15)
GFR SERPL CREATININE-BSD FRML MDRD: >90 ML/MIN/1.7M2
GLUCOSE SERPL-MCNC: 117 MG/DL (ref 70–99)
GLUCOSE UR STRIP-MCNC: NEGATIVE MG/DL
HCT VFR BLD AUTO: 42 % (ref 40–53)
HGB BLD-MCNC: 14 G/DL (ref 13.3–17.7)
HGB UR QL STRIP: ABNORMAL
KETONES UR STRIP-MCNC: 10 MG/DL
LACTATE BLD-SCNC: 1.7 MMOL/L (ref 0.7–2)
LACTATE BLD-SCNC: 3.3 MMOL/L (ref 0.7–2)
LEUKOCYTE ESTERASE UR QL STRIP: ABNORMAL
LIPASE SERPL-CCNC: 62 U/L (ref 73–393)
LYMPHOCYTES # BLD AUTO: 0.6 10E9/L (ref 0.8–5.3)
LYMPHOCYTES NFR BLD AUTO: 2 %
MCH RBC QN AUTO: 27.4 PG (ref 26.5–33)
MCHC RBC AUTO-ENTMCNC: 33.3 G/DL (ref 31.5–36.5)
MCV RBC AUTO: 82 FL (ref 78–100)
MONOCYTES # BLD AUTO: 2 10E9/L (ref 0–1.3)
MONOCYTES NFR BLD AUTO: 7 %
MUCOUS THREADS #/AREA URNS LPF: PRESENT /LPF
NEUTROPHILS # BLD AUTO: 25.7 10E9/L (ref 1.6–8.3)
NEUTROPHILS NFR BLD AUTO: 91 %
NITRATE UR QL: NEGATIVE
PH UR STRIP: 7 PH (ref 5–7)
PLATELET # BLD AUTO: 150 10E9/L (ref 150–450)
PLATELET # BLD EST: NORMAL 10*3/UL
POTASSIUM SERPL-SCNC: 3.8 MMOL/L (ref 3.4–5.3)
PROT SERPL-MCNC: 6.6 G/DL (ref 6.8–8.8)
RBC # BLD AUTO: 5.11 10E12/L (ref 4.4–5.9)
RBC #/AREA URNS AUTO: 59 /HPF (ref 0–2)
RBC MORPH BLD: ABNORMAL
SODIUM SERPL-SCNC: 138 MMOL/L (ref 133–144)
SOURCE: ABNORMAL
SP GR UR STRIP: 1.02 (ref 1–1.03)
SQUAMOUS #/AREA URNS AUTO: 4 /HPF (ref 0–1)
UROBILINOGEN UR STRIP-MCNC: NORMAL MG/DL (ref 0–2)
WBC # BLD AUTO: 28.2 10E9/L (ref 4–11)
WBC #/AREA URNS AUTO: 33 /HPF (ref 0–2)

## 2017-10-07 PROCEDURE — 83605 ASSAY OF LACTIC ACID: CPT | Performed by: EMERGENCY MEDICINE

## 2017-10-07 PROCEDURE — 81001 URINALYSIS AUTO W/SCOPE: CPT | Performed by: EMERGENCY MEDICINE

## 2017-10-07 PROCEDURE — 99285 EMERGENCY DEPT VISIT HI MDM: CPT | Performed by: EMERGENCY MEDICINE

## 2017-10-07 PROCEDURE — 25000128 H RX IP 250 OP 636: Performed by: EMERGENCY MEDICINE

## 2017-10-07 PROCEDURE — 87086 URINE CULTURE/COLONY COUNT: CPT | Performed by: EMERGENCY MEDICINE

## 2017-10-07 PROCEDURE — 96376 TX/PRO/DX INJ SAME DRUG ADON: CPT

## 2017-10-07 PROCEDURE — 96375 TX/PRO/DX INJ NEW DRUG ADDON: CPT

## 2017-10-07 PROCEDURE — 83605 ASSAY OF LACTIC ACID: CPT | Performed by: INTERNAL MEDICINE

## 2017-10-07 PROCEDURE — 25000132 ZZH RX MED GY IP 250 OP 250 PS 637: Performed by: EMERGENCY MEDICINE

## 2017-10-07 PROCEDURE — 74176 CT ABD & PELVIS W/O CONTRAST: CPT

## 2017-10-07 PROCEDURE — 87088 URINE BACTERIA CULTURE: CPT | Performed by: EMERGENCY MEDICINE

## 2017-10-07 PROCEDURE — 96361 HYDRATE IV INFUSION ADD-ON: CPT

## 2017-10-07 PROCEDURE — 96365 THER/PROPH/DIAG IV INF INIT: CPT

## 2017-10-07 PROCEDURE — 99220 ZZC INITIAL OBSERVATION CARE,LEVL III: CPT | Performed by: INTERNAL MEDICINE

## 2017-10-07 PROCEDURE — 36415 COLL VENOUS BLD VENIPUNCTURE: CPT | Performed by: INTERNAL MEDICINE

## 2017-10-07 PROCEDURE — 25000132 ZZH RX MED GY IP 250 OP 250 PS 637: Performed by: INTERNAL MEDICINE

## 2017-10-07 PROCEDURE — 83690 ASSAY OF LIPASE: CPT | Performed by: EMERGENCY MEDICINE

## 2017-10-07 PROCEDURE — 85025 COMPLETE CBC W/AUTO DIFF WBC: CPT | Performed by: EMERGENCY MEDICINE

## 2017-10-07 PROCEDURE — G0378 HOSPITAL OBSERVATION PER HR: HCPCS

## 2017-10-07 PROCEDURE — 80053 COMPREHEN METABOLIC PANEL: CPT | Performed by: EMERGENCY MEDICINE

## 2017-10-07 PROCEDURE — 87040 BLOOD CULTURE FOR BACTERIA: CPT | Performed by: INTERNAL MEDICINE

## 2017-10-07 PROCEDURE — 99285 EMERGENCY DEPT VISIT HI MDM: CPT | Mod: 25

## 2017-10-07 RX ORDER — CEFTRIAXONE 1 G/1
1 INJECTION, POWDER, FOR SOLUTION INTRAMUSCULAR; INTRAVENOUS ONCE
Status: COMPLETED | OUTPATIENT
Start: 2017-10-07 | End: 2017-10-07

## 2017-10-07 RX ORDER — ACETAMINOPHEN 325 MG/1
650 TABLET ORAL EVERY 4 HOURS PRN
Status: DISCONTINUED | OUTPATIENT
Start: 2017-10-07 | End: 2017-10-09 | Stop reason: HOSPADM

## 2017-10-07 RX ORDER — ONDANSETRON 2 MG/ML
4 INJECTION INTRAMUSCULAR; INTRAVENOUS EVERY 6 HOURS PRN
Status: DISCONTINUED | OUTPATIENT
Start: 2017-10-07 | End: 2017-10-09 | Stop reason: HOSPADM

## 2017-10-07 RX ORDER — PROMETHAZINE HYDROCHLORIDE 25 MG/ML
12.5 INJECTION, SOLUTION INTRAMUSCULAR; INTRAVENOUS ONCE
Status: COMPLETED | OUTPATIENT
Start: 2017-10-07 | End: 2017-10-07

## 2017-10-07 RX ORDER — HYDROMORPHONE HYDROCHLORIDE 1 MG/ML
.3-.5 INJECTION, SOLUTION INTRAMUSCULAR; INTRAVENOUS; SUBCUTANEOUS
Status: DISCONTINUED | OUTPATIENT
Start: 2017-10-07 | End: 2017-10-07

## 2017-10-07 RX ORDER — KETOROLAC TROMETHAMINE 30 MG/ML
30 INJECTION, SOLUTION INTRAMUSCULAR; INTRAVENOUS ONCE
Status: COMPLETED | OUTPATIENT
Start: 2017-10-07 | End: 2017-10-07

## 2017-10-07 RX ORDER — ONDANSETRON 2 MG/ML
4 INJECTION INTRAMUSCULAR; INTRAVENOUS EVERY 30 MIN PRN
Status: DISCONTINUED | OUTPATIENT
Start: 2017-10-07 | End: 2017-10-07

## 2017-10-07 RX ORDER — ONDANSETRON 4 MG/1
4 TABLET, ORALLY DISINTEGRATING ORAL EVERY 6 HOURS PRN
Status: DISCONTINUED | OUTPATIENT
Start: 2017-10-07 | End: 2017-10-09 | Stop reason: HOSPADM

## 2017-10-07 RX ORDER — NALOXONE HYDROCHLORIDE 0.4 MG/ML
.1-.4 INJECTION, SOLUTION INTRAMUSCULAR; INTRAVENOUS; SUBCUTANEOUS
Status: DISCONTINUED | OUTPATIENT
Start: 2017-10-07 | End: 2017-10-09 | Stop reason: HOSPADM

## 2017-10-07 RX ORDER — PROMETHAZINE HYDROCHLORIDE 25 MG/1
25 TABLET ORAL EVERY 6 HOURS PRN
Qty: 10 TABLET | Refills: 0 | Status: SHIPPED | OUTPATIENT
Start: 2017-10-07 | End: 2018-02-07

## 2017-10-07 RX ORDER — OXYCODONE HYDROCHLORIDE 5 MG/1
5 TABLET ORAL EVERY 6 HOURS PRN
Status: DISCONTINUED | OUTPATIENT
Start: 2017-10-07 | End: 2017-10-09 | Stop reason: HOSPADM

## 2017-10-07 RX ORDER — ONDANSETRON 4 MG/1
4 TABLET, ORALLY DISINTEGRATING ORAL EVERY 6 HOURS PRN
Qty: 15 TABLET | Refills: 0 | Status: SHIPPED | OUTPATIENT
Start: 2017-10-07 | End: 2017-10-10

## 2017-10-07 RX ORDER — CIPROFLOXACIN 500 MG/1
500 TABLET, FILM COATED ORAL 2 TIMES DAILY
Qty: 14 TABLET | Refills: 0 | Status: SHIPPED | OUTPATIENT
Start: 2017-10-07 | End: 2017-10-09

## 2017-10-07 RX ORDER — SODIUM CHLORIDE AND POTASSIUM CHLORIDE 150; 900 MG/100ML; MG/100ML
INJECTION, SOLUTION INTRAVENOUS CONTINUOUS
Status: DISCONTINUED | OUTPATIENT
Start: 2017-10-07 | End: 2017-10-09

## 2017-10-07 RX ORDER — LIDOCAINE 40 MG/G
CREAM TOPICAL
Status: DISCONTINUED | OUTPATIENT
Start: 2017-10-07 | End: 2017-10-09 | Stop reason: HOSPADM

## 2017-10-07 RX ORDER — CIPROFLOXACIN 500 MG/1
500 TABLET, FILM COATED ORAL 2 TIMES DAILY
Qty: 14 TABLET | Refills: 0 | Status: SHIPPED | OUTPATIENT
Start: 2017-10-07 | End: 2017-10-14

## 2017-10-07 RX ORDER — LORAZEPAM 2 MG/ML
1 INJECTION INTRAMUSCULAR ONCE
Status: COMPLETED | OUTPATIENT
Start: 2017-10-07 | End: 2017-10-07

## 2017-10-07 RX ORDER — CEFTRIAXONE 1 G/1
1 INJECTION, POWDER, FOR SOLUTION INTRAMUSCULAR; INTRAVENOUS EVERY 24 HOURS
Status: DISCONTINUED | OUTPATIENT
Start: 2017-10-08 | End: 2017-10-09 | Stop reason: HOSPADM

## 2017-10-07 RX ORDER — ALBUTEROL SULFATE 90 UG/1
2 AEROSOL, METERED RESPIRATORY (INHALATION) 4 TIMES DAILY PRN
Status: DISCONTINUED | OUTPATIENT
Start: 2017-10-07 | End: 2017-10-09 | Stop reason: HOSPADM

## 2017-10-07 RX ORDER — KETOROLAC TROMETHAMINE 30 MG/ML
30 INJECTION, SOLUTION INTRAMUSCULAR; INTRAVENOUS EVERY 6 HOURS PRN
Status: DISCONTINUED | OUTPATIENT
Start: 2017-10-07 | End: 2017-10-09 | Stop reason: HOSPADM

## 2017-10-07 RX ORDER — ONDANSETRON 2 MG/ML
4 INJECTION INTRAMUSCULAR; INTRAVENOUS ONCE
Status: DISCONTINUED | OUTPATIENT
Start: 2017-10-07 | End: 2017-10-07

## 2017-10-07 RX ORDER — PROMETHAZINE HYDROCHLORIDE 25 MG/ML
25 INJECTION, SOLUTION INTRAMUSCULAR; INTRAVENOUS EVERY 6 HOURS PRN
Status: DISCONTINUED | OUTPATIENT
Start: 2017-10-07 | End: 2017-10-09 | Stop reason: HOSPADM

## 2017-10-07 RX ORDER — NALOXONE HYDROCHLORIDE 0.4 MG/ML
.1-.4 INJECTION, SOLUTION INTRAMUSCULAR; INTRAVENOUS; SUBCUTANEOUS
Status: DISCONTINUED | OUTPATIENT
Start: 2017-10-07 | End: 2017-10-09

## 2017-10-07 RX ORDER — HYDROMORPHONE HCL/0.9% NACL/PF 0.2MG/0.2
0.2 SYRINGE (ML) INTRAVENOUS
Status: DISCONTINUED | OUTPATIENT
Start: 2017-10-07 | End: 2017-10-09 | Stop reason: HOSPADM

## 2017-10-07 RX ADMIN — OXYCODONE HYDROCHLORIDE 5 MG: 5 TABLET ORAL at 21:16

## 2017-10-07 RX ADMIN — ONDANSETRON 4 MG: 2 INJECTION INTRAMUSCULAR; INTRAVENOUS at 09:37

## 2017-10-07 RX ADMIN — PROMETHAZINE HYDROCHLORIDE 25 MG: 25 INJECTION INTRAMUSCULAR; INTRAVENOUS at 21:16

## 2017-10-07 RX ADMIN — PROMETHAZINE HYDROCHLORIDE 12.5 MG: 25 INJECTION INTRAMUSCULAR; INTRAVENOUS at 11:47

## 2017-10-07 RX ADMIN — SODIUM CHLORIDE 1000 ML: 9 INJECTION, SOLUTION INTRAVENOUS at 08:55

## 2017-10-07 RX ADMIN — SODIUM CHLORIDE 1000 ML: 9 INJECTION, SOLUTION INTRAVENOUS at 18:07

## 2017-10-07 RX ADMIN — LORAZEPAM 1 MG: 2 INJECTION INTRAMUSCULAR; INTRAVENOUS at 10:08

## 2017-10-07 RX ADMIN — ACETAMINOPHEN 650 MG: 325 TABLET, FILM COATED ORAL at 19:02

## 2017-10-07 RX ADMIN — CEFTRIAXONE 1 G: 1 INJECTION, POWDER, FOR SOLUTION INTRAMUSCULAR; INTRAVENOUS at 18:07

## 2017-10-07 RX ADMIN — HYDROMORPHONE HYDROCHLORIDE 1 MG: 1 INJECTION, SOLUTION INTRAMUSCULAR; INTRAVENOUS; SUBCUTANEOUS at 08:58

## 2017-10-07 RX ADMIN — SODIUM CHLORIDE 1000 ML: 9 INJECTION, SOLUTION INTRAVENOUS at 16:55

## 2017-10-07 RX ADMIN — KETOROLAC TROMETHAMINE 30 MG: 30 INJECTION, SOLUTION INTRAMUSCULAR at 16:54

## 2017-10-07 RX ADMIN — POTASSIUM CHLORIDE AND SODIUM CHLORIDE: 900; 150 INJECTION, SOLUTION INTRAVENOUS at 19:04

## 2017-10-07 RX ADMIN — CEFTRIAXONE 1 G: 1 INJECTION, POWDER, FOR SOLUTION INTRAMUSCULAR; INTRAVENOUS at 11:36

## 2017-10-07 RX ADMIN — ONDANSETRON 4 MG: 2 INJECTION INTRAMUSCULAR; INTRAVENOUS at 23:53

## 2017-10-07 RX ADMIN — SODIUM CHLORIDE 1000 ML: 9 INJECTION, SOLUTION INTRAVENOUS at 09:50

## 2017-10-07 RX ADMIN — ACETAMINOPHEN 650 MG: 325 TABLET, FILM COATED ORAL at 23:26

## 2017-10-07 RX ADMIN — ONDANSETRON 4 MG: 2 INJECTION INTRAMUSCULAR; INTRAVENOUS at 08:55

## 2017-10-07 ASSESSMENT — ENCOUNTER SYMPTOMS
CONFUSION: 0
EYE REDNESS: 0
COUGH: 0
CHILLS: 0
NECK STIFFNESS: 0
DIFFICULTY URINATING: 0
FEVER: 0
SHORTNESS OF BREATH: 0
DIARRHEA: 0
ARTHRALGIAS: 0
WHEEZING: 0
VOMITING: 1
NAUSEA: 1
RHINORRHEA: 1
ABDOMINAL PAIN: 0
HEADACHES: 0
COLOR CHANGE: 0

## 2017-10-07 NOTE — H&P
[facility]    History and Physical  Hospital Medicine       Date of Admission:  10/7/2017  Date of Service: 10/7/2017     Assessment & Plan   Santo Toure is a 37 year old male who presents with unrelenting N/V since last PM. Fouind to have UTI in the setting of a recent urologic procedure    1. Complicated UTI-stent 9/14 and then lithotripsy 10/5. started on rocephin in ed.wbc 28.2, temp 100.2, lactate 3.3. If lactate does not normalize, would switch to merrem. Of note, recent UTI was Ecoli sens to all. If not better by Monday, would consider urology consult. Wbc in am. Urine cx and bld cx pending. Ct does not show any obstruction, stent in place.cont flomax    2. Elevated lactate- sepsis vs vol depletion from n/v 24 hrs.  Received 2 liters ivf this amand then lactate 3.3 tonight. Getting 2 more liters ivf and then will recheck lactate    3.Asthma - seasonal- albuteral prn    4. Hearing impaired- needs . Wife currently interpreting      DVT Prophylaxis:mechanical  Code Status: full  Disposition: Anticipate discharge in 1-2 days, admit obs    Misa Balderas          Past Medical History      Past Medical History:   Diagnosis Date     Asthma     seasonal, spring and fall     Deaf      Kidney stone 09/14/2017    had R side stent placed and then on 10/5/2017 had lithitripsy     PONV (postoperative nausea and vomiting)      Patient Active Problem List    Diagnosis Date Noted     Pyelonephritis 10/07/2017     Priority: Medium     Kidney stone 09/13/2017     Priority: Medium        Past Surgical History     Past Surgical History:   Procedure Laterality Date     CYSTOSCOPY, RETROGRADES, INSERT STENT URETER(S), COMBINED Right 9/14/2017    Procedure: COMBINED CYSTOSCOPY, RETROGRADES, INSERT STENT URETER(S);  Cysto , right stent placement;  Surgeon: Pavel Lundy MD;  Location: [facility]     LASER HOLMIUM LITHOTRIPSY URETER(S), INSERT STENT, COMBINED Right 10/5/2017     Procedure: COMBINED CYSTOSCOPY, URETEROSCOPY, LASER HOLMIUM LITHOTRIPSY URETER(S), INSERT STENT;  Cystoscopy, right ureteroscopy, laser lithotripsy, stent exchange;  Surgeon: Pavel Lundy MD;  Location: [facility]        History of Present Illness   Santo Toure is a 37 year old male who presents with N/v since last night. Unable to eat. Of note pt had stent for R renal stone 9.14 and then lithotripsy 10/5.  He has had some sweats and feeling hot. He has occ R side back pain when he urinates. No change in his urine.  He reports after tx in the ed for N/V he has been better and is now hungry. He had a popsickle a couple hours ago but hadn't had anything to eat or drink before that since last night.  He does say he has a slight headache now. His Left chest hurt everytime he vomited. He has had a st since the surgery but it is improving    Prior to Admission Medications    Albuteral prn  Prior to Admission Medications   Prescriptions Last Dose Informant Patient Reported? Taking?   acetaminophen (TYLENOL) 500 MG tablet 10/7/2017 at Unknown time  No Yes   Sig: Take 2 tablets (1,000 mg) by mouth every 6 hours as needed for mild pain or fever   ketorolac (TORADOL) 10 MG tablet Past Week at Unknown time  No Yes   Sig: Take 1 tablet (10 mg) by mouth every 6 hours as needed for moderate pain   oxyCODONE (ROXICODONE) 5 MG IR tablet 10/7/2017 at Unknown time  No Yes   Sig: Take 1 tablet (5 mg) by mouth every 6 hours as needed for pain maximum 4 tablet(s) per day      Facility-Administered Medications: None     Allergies   Allergies   Allergen Reactions     Penicillins Other (See Comments) and Rash     Vomiting as a infant       Family History    Family History   Problem Relation Age of Onset     HEART DISEASE Mother      MI, also frequent kidney infections       Social History   , 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 Left wrist burn a month ago improving    Physical Exam   /51 (BP Location: Left arm)  Pulse 112  Temp 98.4  F (36.9  C) (Oral)  Resp 18  Ht 6' (1.829 m)  Wt 229 lb 15 oz (104.3 kg)  SpO2 96%  BMI 31.19 kg/m2     Weight: 229 lbs 15.04 oz Body mass index is 31.19 kg/(m^2).     Constitutional: Alert, oriented, cooperative, no apparent distress, appears nontoxic,tired  Eyes: Eyes are clear, pupils are reactive.  HEENT:  No evidence of cranial trauma.  Lymph/Hematologic: No epitrochlear, axillary, anterior or posterior cervical, or supraclavicular lymphadenopathy is appreciated.  Cardiovascular: Regular rate and rhythm, normal S1 and S2, and no murmur noted. Good peripheral pulses in dp bilaterally. No lower extremity edema.  Respiratory: Clear to auscultation bilaterally.   GI: Soft, non-tender, normal bowel sounds, no hepatosplenomegaly.  Genitourinary: Deferred  Musculoskeletal: Normal muscle bulk and tone.no back tenderness  Skin: Warm and dry, no rashes.   Neurologic: Neck supple. Cranial nerves are grossly intact.  is symmetric.     Data   Data reviewed today:     Recent Labs  Lab 10/07/17  0825   WBC 28.2*   HGB 14.0   MCV 82         POTASSIUM 3.8   CHLORIDE 102   CO2 27   BUN 13   CR 0.84   ANIONGAP 9   JESSIE 8.7   *   ALBUMIN 3.1*   PROTTOTAL 6.6*   BILITOTAL 0.7   ALKPHOS 61   ALT 27   AST 10   LIPASE 62*       Recent Results (from the past 24 hour(s))   CT Abdomen Pelvis w/o Contrast    Narrative    CT ABDOMEN PELVIS W/O CONTRAST 10/7/2017 2:57 PM    HISTORY: Right-sided abdominal pain/recent right ureteral stone/status  post stent placement and lithotripsy/increasing abdominal pain with  intractable vomiting    COMPARISON: None.    TECHNIQUE: Volumetric acquisition of CT images from the lung base  through the pelvis. Radiation dose for this scan was reduced using  automated exposure control, adjustment of the mA and/or KV according  to patient size, or iterative  reconstruction technique.     Radiation dose for this scan was reduced using automated exposure  control, adjustment of the mA and/or KV according to patient size, or  iterative reconstruction technique.     FINDINGS: Right renal stent in place. Enlarged right kidney with  perinephric soft tissue stranding suggesting some inflammatory change.  No hydronephrosis. Negative left kidney. Negative GI tract.  Negative  liver, pancreas and spleen. Negative adrenals. Negative aorta and  retroperitoneum. Negative pelvic structures.       Impression    IMPRESSION: Right renal stent in place. Enlarged right kidney with  perinephric soft tissue stranding suggesting some inflammatory change.  No hydronephrosis.      MD Misa GRIFFITHS

## 2017-10-07 NOTE — IP AVS SNAPSHOT
[facility]    [address] [address]    Phone:  [phone]    Fax:  [phone]                                       After Visit Summary   10/7/2017    Santo Toure    MRN: [id number]           After Visit Summary Signature Page     I have received my discharge instructions, and my questions have been answered. I have discussed any challenges I see with this plan with the nurse or doctor.    ..........................................................................................................................................  Patient/Patient Representative Signature      ..........................................................................................................................................  Patient Representative Print Name and Relationship to Patient    ..................................................               ................................................  Date                                            Time    ..........................................................................................................................................  Reviewed by Signature/Title    ...................................................              ..............................................  Date                                                            Time

## 2017-10-07 NOTE — ED AVS SNAPSHOT
[facility] Emergency Department    [address] [address]    Phone:  [phone]    Fax:  [phone]                                       Santo Toure   MRN: [id number]    Department:  [facility] Emergency Department   Date of Visit:  10/7/2017           Patient Information     Date Of Birth          1980        Your diagnoses for this visit were:     Kidney stone- status post right ureteral stent placement with lithotripsy     Leukocytosis, unspecified type     Urinary tract infection in male        You were seen by Adam Apple DO.        Discharge Instructions       Workup identified elevated white blood cell count and urine testing that is concerning for infection.  This infection is most likely the source for discomfort as well as vomiting.  While in the emergency room he received ceftriaxone 1000 mg IV loading dose.  This was staffed in her system for 24 hours  Start Cipro 5 mg twice daily and take for 7 days  Continue with oxycodone at home as needed for severe pain only.  Try to limit oxycodone due to risk of GI upset including nausea and vomiting  Monitor for fever, chills-if I noted would return to the emergency department.  Arrange for recheck in the clinic for primary care provider or urology on Monday or Tuesday.  Phenergan 0.5 mg every 6 hours as needed for nausea    Future Appointments        Provider Department Dept Phone Center    10/11/2017 2:00 PM Pavel Lundy MD; [facility] [phone] [facility]      24 Hour Appointment Hotline       To make an appointment at any [facility], call [phone] ([phone]). If you don't have a family doctor or clinic, we will help you find one. [facility] are conveniently located to serve the needs of you and your family.             Review of your medicines      START taking        Dose / Directions Last dose taken    ciprofloxacin 500 MG tablet   Commonly known  as:  CIPRO   Dose:  500 mg   Quantity:  14 tablet        Take 1 tablet (500 mg) by mouth 2 times daily for 7 days   Refills:  0        promethazine 25 MG tablet   Commonly known as:  PHENERGAN   Dose:  25 mg   Quantity:  10 tablet        Take 1 tablet (25 mg) by mouth every 6 hours as needed for nausea   Refills:  0          CONTINUE these medicines which may have CHANGED, or have new prescriptions. If we are uncertain of the size of tablets/capsules you have at home, strength may be listed as something that might have changed.        Dose / Directions Last dose taken    ondansetron 4 MG ODT tab   Commonly known as:  ZOFRAN ODT   Dose:  4 mg   What changed:  reasons to take this   Quantity:  15 tablet        Take 1 tablet (4 mg) by mouth every 6 hours as needed for nausea   Refills:  0          Our records show that you are taking the medicines listed below. If these are incorrect, please call your family doctor or clinic.        Dose / Directions Last dose taken    acetaminophen 500 MG tablet   Commonly known as:  TYLENOL   Dose:  1000 mg   Quantity:  1 Bottle        Take 2 tablets (1,000 mg) by mouth every 6 hours as needed for mild pain or fever   Refills:  0        ketorolac 10 MG tablet   Commonly known as:  TORADOL   Dose:  10 mg   Quantity:  20 tablet        Take 1 tablet (10 mg) by mouth every 6 hours as needed for moderate pain   Refills:  0        * oxyCODONE 5 MG IR tablet   Commonly known as:  ROXICODONE   Dose:  5-10 mg   Quantity:  15 tablet        Take 1-2 tablets (5-10 mg) by mouth every 3 hours as needed for moderate to severe pain   Refills:  0        * oxyCODONE 5 MG IR tablet   Commonly known as:  ROXICODONE   Dose:  5 mg   Quantity:  10 tablet        Take 1 tablet (5 mg) by mouth every 6 hours as needed for pain maximum 4 tablet(s) per day   Refills:  0        * Notice:  This list has 2 medication(s) that are the same as other medications prescribed for you. Read the directions carefully, and  ask your doctor or other care provider to review them with you.            Prescriptions were sent or printed at these locations (3 Prescriptions)                   Other Prescriptions                Printed at Department/Unit printer (3 of 3)         ciprofloxacin (CIPRO) 500 MG tablet               ondansetron (ZOFRAN ODT) 4 MG ODT tab               promethazine (PHENERGAN) 25 MG tablet                Procedures and tests performed during your visit     CBC with platelets differential    Comprehensive metabolic panel    Lipase    UA reflex to Microscopic      Orders Needing Specimen Collection     None      Pending Results     Date and Time Order Name Status Description    10/5/2017 1659 Stone analysis In process             Pending Culture Results     Date and Time Order Name Status Description    10/5/2017 1659 Stone analysis In process             Pending Results Instructions     If you had any lab results that were not finalized at the time of your Discharge, you can call the ED Lab Result RN at [phone]. You will be contacted by this team for any positive Lab results or changes in treatment. The nurses are available 7 days a week from 10A to 6:30P.  You can leave a message 24 hours per day and they will return your call.        Test Results From Your Hospital Stay        10/7/2017 10:46 AM      Component Results     Component Value Ref Range & Units Status    WBC 28.2 (H) 4.0 - 11.0 10e9/L Final    RBC Count 5.11 4.4 - 5.9 10e12/L Final    Hemoglobin 14.0 13.3 - 17.7 g/dL Final    Hematocrit 42.0 40.0 - 53.0 % Final    MCV 82 78 - 100 fl Final    MCH 27.4 26.5 - 33.0 pg Final    MCHC 33.3 31.5 - 36.5 g/dL Final    RDW 13.5 10.0 - 15.0 % Final    Platelet Count 150 150 - 450 10e9/L Final    Diff Method Manual Differential  Final    % Neutrophils 91.0 % Final    % Lymphocytes 2.0 % Final    % Monocytes 7.0 % Final    % Eosinophils 0.0 % Final    % Basophils 0.0 % Final    Absolute Neutrophil 25.7 (H) 1.6 -  8.3 10e9/L Final    Absolute Lymphocytes 0.6 (L) 0.8 - 5.3 10e9/L Final    Absolute Monocytes 2.0 (H) 0.0 - 1.3 10e9/L Final    Absolute Eosinophils 0.0 0.0 - 0.7 10e9/L Final    Absolute Basophils 0.0 0.0 - 0.2 10e9/L Final    RBC Morphology   Final    Consistent with reported results    Platelet Estimate Normal  Final         10/7/2017  9:09 AM      Component Results     Component Value Ref Range & Units Status    Sodium 138 133 - 144 mmol/L Final    Potassium 3.8 3.4 - 5.3 mmol/L Final    Chloride 102 94 - 109 mmol/L Final    Carbon Dioxide 27 20 - 32 mmol/L Final    Anion Gap 9 3 - 14 mmol/L Final    Glucose 117 (H) 70 - 99 mg/dL Final    Urea Nitrogen 13 7 - 30 mg/dL Final    Creatinine 0.84 0.66 - 1.25 mg/dL Final    GFR Estimate >90 >60 mL/min/1.7m2 Final    Non  GFR Calc    GFR Estimate If Black >90 >60 mL/min/1.7m2 Final    African American GFR Calc    Calcium 8.7 8.5 - 10.1 mg/dL Final    Bilirubin Total 0.7 0.2 - 1.3 mg/dL Final    Albumin 3.1 (L) 3.4 - 5.0 g/dL Final    Protein Total 6.6 (L) 6.8 - 8.8 g/dL Final    Alkaline Phosphatase 61 40 - 150 U/L Final    ALT 27 0 - 70 U/L Final    AST 10 0 - 45 U/L Final         10/7/2017  9:07 AM      Component Results     Component Value Ref Range & Units Status    Lipase 62 (L) 73 - 393 U/L Final         10/7/2017 11:28 AM      Component Results     Component Value Ref Range & Units Status    Color Urine Yellow  Final    Appearance Urine Slightly Cloudy  Final    Glucose Urine Negative NEG^Negative mg/dL Final    Bilirubin Urine Negative NEG^Negative Final    Ketones Urine 10 (A) NEG^Negative mg/dL Final    Specific Gravity Urine 1.018 1.003 - 1.035 Final    Blood Urine Trace (A) NEG^Negative Final    pH Urine 7.0 5.0 - 7.0 pH Final    Protein Albumin Urine 30 (A) NEG^Negative mg/dL Final    Urobilinogen mg/dL Normal 0.0 - 2.0 mg/dL Final    Nitrite Urine Negative NEG^Negative Final    Leukocyte Esterase Urine Large (A) NEG^Negative Final     Source Midstream Urine  Final    RBC Urine 59 (H) 0 - 2 /HPF Final    WBC Urine 33 (H) 0 - 2 /HPF Final    Bacteria Urine Few (A) NEG^Negative /HPF Final    Squamous Epithelial /HPF Urine 4 (H) 0 - 1 /HPF Final    Mucous Urine Present (A) NEG^Negative /LPF Final                Thank you for choosing [facility]       Thank you for choosing [facility] for your care. Our goal is always to provide you with excellent care. Hearing back from our patients is one way we can continue to improve our services. Please take a few minutes to complete the written survey that you may receive in the mail after you visit with us. Thank you!        [facility] Information     [employer] gives you secure access to your electronic health record. If you see a primary care provider, you can also send messages to your care team and make appointments. If you have questions, please call your primary care clinic.  If you do not have a primary care provider, please call [phone] and they will assist you.        Care EveryWhere ID     This is your Care EveryWhere ID. This could be used by other organizations to access your [facility] medical records  [id number]        Equal Access to Services     ANTONINA ALBA : Hadii teo Victoria, krista magaña, saúl reza, mookie rich. So [facility] [phone].    ATENCIÓN: Si habla español, tiene a marshall disposición servicios gratuitos de asistencia lingüística. Llame al [phone].    We comply with applicable federal civil rights laws and [place] laws. We do not discriminate on the basis of race, color, national origin, age, disability, sex, sexual orientation, or gender identity.            After Visit Summary       This is your record. Keep this with you and show to your community pharmacist(s) and doctor(s) at your next visit.

## 2017-10-07 NOTE — IP AVS SNAPSHOT
MRN:[id number]                      After Visit Summary   10/7/2017    Santo Toure    MRN: [id number]           Thank you!     Thank you for choosing [facility] for your care. Our goal is always to provide you with excellent care. Hearing back from our patients is one way we can continue to improve our services. Please take a few minutes to complete the written survey that you may receive in the mail after you visit with us. Thank you!        Patient Information     Date Of Birth          1980        Designated Caregiver       Most Recent Value    Caregiver    Will someone help with your care after discharge? yes    Name of designated caregiver Jovanna Agarwal [wife]    Phone number of caregiver [phone]    Caregiver address Same as pt.      About your hospital stay     You were admitted on:  October 7, 2017 You last received care in the:  [facility]    You were discharged on:  October 9, 2017       Who to Call     For medical emergencies, please call 911.  For non-urgent questions about your medical care, please call your primary care provider or clinic, [phone]          Attending Provider     Provider Specialty    Adam Apple DO Emergency Medicine    Misa Balderas MD Internal Medicine    [place], Radha Hernandez MD Family Practice    [facility], Luis Angel Mack MD [facility]       Primary Care Provider Office Phone # Fax #    [facility] [phone]      Your next 10 appointments already scheduled     Oct 11, 2017  2:00 PM CDT   CYSTO with Pavel Lundy MD   [facility]    [address] [address]   [phone]              Further instructions from your care team       Take keflex 500 mg three times daily for urinary track infection. Start tonight.  Continue with oxycodone at home as needed for severe pain only.  Try to limit oxycodone due  to risk of GI upset including nausea and vomiting  Follow up with urology on Oct 11 th as scheduled.      Pending Results     Date and Time Order Name Status Description    10/7/2017 1712 Blood culture In process     10/7/2017 1712 Blood culture In process     10/5/2017 1659 Stone analysis In process             Statement of Approval     Ordered          10/09/17 1356  I have reviewed and agree with all the recommendations and orders detailed in this document.  EFFECTIVE NOW     Approved and electronically signed by:  Luis Angel Nur MD             Admission Information     Date & Time Provider Department Dept. Phone    10/7/2017 Luis Angel Nur MD [facility] Surgical [phone]      Your Vitals Were     Blood Pressure Pulse Temperature Respirations Height Weight    136/87 (BP Location: Right arm) 53 96.6  F (35.9  C) (Oral) 18 1.829 m (6') 104.3 kg (229 lb 15 oz)    Pulse Oximetry BMI (Body Mass Index)                97% 31.19 kg/m2          MyChart Information     [employer] gives you secure access to your electronic health record. If you see a primary care provider, you can also send messages to your care team and make appointments. If you have questions, please call your primary care clinic.  If you do not have a primary care provider, please call [phone] and they will assist you.        Care EveryWhere ID     This is your Care EveryWhere ID. This could be used by other organizations to access your [place] medical records  [id number]        Equal Access to Services     ANTONINA ALBA : Hadii teo macario Sovalentin, waaxda luqadaha, qaybta kaalmada breyada, mookie rich. So [facility] [phone].    ATENCIÓN: Si habla español, tiene a marshall disposición servicios gratuitos de asistencia lingüística. Llame al [phone].    We comply with applicable federal civil rights laws and [place] laws. We do not discriminate on the basis of race, color, national  origin, age, disability, sex, sexual orientation, or gender identity.               Review of your medicines      START taking        Dose / Directions    cephALEXin 500 MG capsule   Commonly known as:  KEFLEX        Dose:  500 mg   Take 1 capsule (500 mg) by mouth 3 times daily   Quantity:  30 capsule   Refills:  0       * ciprofloxacin 500 MG tablet   Commonly known as:  CIPRO        Dose:  500 mg   Take 1 tablet (500 mg) by mouth 2 times daily for 7 days   Quantity:  14 tablet   Refills:  0       * ciprofloxacin 500 MG tablet   Commonly known as:  CIPRO        Dose:  500 mg   Take 1 tablet (500 mg) by mouth 2 times daily for 7 days   Quantity:  14 tablet   Refills:  0       promethazine 25 MG tablet   Commonly known as:  PHENERGAN        Dose:  25 mg   Take 1 tablet (25 mg) by mouth every 6 hours as needed for nausea   Quantity:  10 tablet   Refills:  0       * Notice:  This list has 2 medication(s) that are the same as other medications prescribed for you. Read the directions carefully, and ask your doctor or other care provider to review them with you.      CONTINUE these medicines which may have CHANGED, or have new prescriptions. If we are uncertain of the size of tablets/capsules you have at home, strength may be listed as something that might have changed.        Dose / Directions    ondansetron 4 MG ODT tab   Commonly known as:  ZOFRAN ODT   This may have changed:  reasons to take this        Dose:  4 mg   Take 1 tablet (4 mg) by mouth every 6 hours as needed for nausea   Quantity:  15 tablet   Refills:  0         CONTINUE these medicines which have NOT CHANGED        Dose / Directions    acetaminophen 500 MG tablet   Commonly known as:  TYLENOL   Used for:  Calculi, ureter        Dose:  1000 mg   Take 2 tablets (1,000 mg) by mouth every 6 hours as needed for mild pain or fever   Quantity:  1 Bottle   Refills:  0       ketorolac 10 MG tablet   Commonly known as:  TORADOL   Used for:  Calculi, ureter         Dose:  10 mg   Take 1 tablet (10 mg) by mouth every 6 hours as needed for moderate pain   Quantity:  20 tablet   Refills:  0       oxyCODONE 5 MG IR tablet   Commonly known as:  ROXICODONE   Used for:  Kidney stone        Dose:  5 mg   Take 1 tablet (5 mg) by mouth every 6 hours as needed for pain maximum 4 tablet(s) per day   Quantity:  10 tablet   Refills:  0            Where to get your medicines      These medications were sent to [place], [place] - [address]  [address] [address]     Phone:  [phone]     cephALEXin 500 MG capsule    ciprofloxacin 500 MG tablet         Some of these will need a paper prescription and others can be bought over the counter. Ask your nurse if you have questions.     Bring a paper prescription for each of these medications     ciprofloxacin 500 MG tablet    ondansetron 4 MG ODT tab    promethazine 25 MG tablet               ANTIBIOTIC INSTRUCTION     You've Been Prescribed an Antibiotic - Now What?  Your healthcare team thinks that you or your loved one might have an infection. Some infections can be treated with antibiotics, which are powerful, life-saving drugs. Like all medications, antibiotics have side effects and should only be used when necessary. There are some important things you should know about your antibiotic treatment.      Your healthcare team may run tests before you start taking an antibiotic.    Your team may take samples (e.g., from your blood, urine or other areas) to run tests to look for bacteria. These test can be important to determine if you need an antibiotic at all and, if you do, which antibiotic will work best.      Within a few days, your healthcare team might change or even stop your antibiotic.    Your team may start you on an antibiotic while they are working to find out what is making you sick.    Your team might change your antibiotic because test results show that a different antibiotic would be  better to treat your infection.    In some cases, once your team has more information, they learn that you do not need an antibiotic at all. They may find out that you don't have an infection, or that the antibiotic you're taking won't work against your infection. For example, an infection caused by a virus can't be treated with antibiotics. Staying on an antibiotic when you don't need it is more likely to be harmful than helpful.      You may experience side effects from your antibiotic.    Like all medications, antibiotics have side effects. Some of these can be serious.    Let you healthcare team know if you have any known allergies when you are admitted to the hospital.    One significant side effect of nearly all antibiotics is the risk of severe and sometimes deadly diarrhea caused by Clostridium difficile (C. Difficile). This occurs when a person takes antibiotics because some good germs are destroyed. Antibiotic use allows C. diificile to take over, putting patients at high risk for this serious infection.    As a patient or caregiver, it is important to understand your or your loved one's antibiotic treatment. It is especially important for caregivers to speak up when patients can't speak for themselves. Here are some important questions to ask your healthcare team.    What infection is this antibiotic treating and how do you know I have that infection?    What side effects might occur from this antibiotic?    How long will I need to take this antibiotic?    Is it safe to take this antibiotic with other medications or supplements (e.g., vitamins) that I am taking?     Are there any special directions I need to know about taking this antibiotic? For example, should I take it with food?    How will I be monitored to know whether my infection is responding to the antibiotic?    What tests may help to make sure the right antibiotic is prescribed for me?      Information provided by:  www.cdc.gov/getsmart  U.S[place]  Department of Health and Human Services  Centers for disease Control and Prevention  National Center for Emerging and Zoonotic Infectious Diseases  Division of Healthcare Quality Promotion         Protect others around you: Learn how to safely use, store and throw away your medicines at www.disposemymeds.org.             Medication List: This is a list of all your medications and when to take them. Check marks below indicate your daily home schedule. Keep this list as a reference.      Medications           Morning Afternoon Evening Bedtime As Needed    acetaminophen 500 MG tablet   Commonly known as:  TYLENOL   Take 2 tablets (1,000 mg) by mouth every 6 hours as needed for mild pain or fever   Last time this was given:  650 mg on 10/9/2017  3:18 AM                                   cephALEXin 500 MG capsule   Commonly known as:  KEFLEX   Take 1 capsule (500 mg) by mouth 3 times daily                                         * ciprofloxacin 500 MG tablet   Commonly known as:  CIPRO   Take 1 tablet (500 mg) by mouth 2 times daily for 7 days                                      * ciprofloxacin 500 MG tablet   Commonly known as:  CIPRO   Take 1 tablet (500 mg) by mouth 2 times daily for 7 days                                ketorolac 10 MG tablet   Commonly known as:  TORADOL   Take 1 tablet (10 mg) by mouth every 6 hours as needed for moderate pain                                   ondansetron 4 MG ODT tab   Commonly known as:  ZOFRAN ODT   Take 1 tablet (4 mg) by mouth every 6 hours as needed for nausea                                   oxyCODONE 5 MG IR tablet   Commonly known as:  ROXICODONE   Take 1 tablet (5 mg) by mouth every 6 hours as needed for pain maximum 4 tablet(s) per day   Last time this was given:  5 mg on 10/7/2017  9:16 PM                                   promethazine 25 MG tablet   Commonly known as:  PHENERGAN   Take 1 tablet (25 mg) by mouth every 6 hours as needed for nausea                                    * Notice:  This list has 2 medication(s) that are the same as other medications prescribed for you. Read the directions carefully, and ask your doctor or other care provider to review them with you.

## 2017-10-07 NOTE — ED NOTES
Patient has  [place] to Observation  order. Patient has been given Observation brochure  What does Observation mean to me .  Patient has been given the opportunity to ask questions about observation status and their plan of care.    Teresa Parr RN

## 2017-10-07 NOTE — ED NOTES
IVF remains infusing, IV abx infusing. Pt feels that ativan helped with nausea, slept for a bit. Now upon awakening, became more nauseasous. MD at bedside.

## 2017-10-07 NOTE — ED NOTES
MD at bedside, planning admission for intractable vomiting. Spouse at bedside and agreeable to plan.

## 2017-10-07 NOTE — ED PROVIDER NOTES
History     Chief Complaint   Patient presents with     Post-op Problem     vomiting since midnight had surgery for kidney stone. c/o HA     HPI     Patient is hearing impaired.- available    Santo Toure is a 37 year old male who presents with repetitive vomiting since midnight.  Complaining of pain in the right flank right side of abdomen 9/10 intensity.  Patient was seen by Dr. Lundy from urology department on Thursday for stent placement and lithotripsy for a ureteral stone.  Has been at home on oxycodone for pain.  Reports last evening starting vomiting of gastric contents.  No blood.  Occasional tinge of bile.  Emesis ×8.  Tolerating no oral fluids.  Denies fever or chills.  No dysuria.  No gross hematuria recognized.    I have reviewed the Medications, Allergies, Past Medical and Surgical History, and Social History in the Epic system.    Allergies:   Allergies   Allergen Reactions     Penicillins Other (See Comments) and Rash     Vomiting as a infant         No current facility-administered medications on file prior to encounter.   Current Outpatient Prescriptions on File Prior to Encounter:  oxyCODONE (ROXICODONE) 5 MG IR tablet Take 1 tablet (5 mg) by mouth every 6 hours as needed for pain maximum 4 tablet(s) per day   oxyCODONE (ROXICODONE) 5 MG IR tablet Take 1-2 tablets (5-10 mg) by mouth every 3 hours as needed for moderate to severe pain   ketorolac (TORADOL) 10 MG tablet Take 1 tablet (10 mg) by mouth every 6 hours as needed for moderate pain   acetaminophen (TYLENOL) 500 MG tablet Take 2 tablets (1,000 mg) by mouth every 6 hours as needed for mild pain or fever   ondansetron (ZOFRAN-ODT) 4 MG ODT tab Take 1 tablet (4 mg) by mouth every 6 hours as needed for nausea or vomiting       Patient Active Problem List   Diagnosis     Kidney stone       Past Surgical History:   Procedure Laterality Date     CYSTOSCOPY, RETROGRADES, INSERT STENT URETER(S), COMBINED Right  9/14/2017    Procedure: COMBINED CYSTOSCOPY, RETROGRADES, INSERT STENT URETER(S);  Cysto , right stent placement;  Surgeon: Pavel Lundy MD;  Location: [facility]     LASER HOLMIUM LITHOTRIPSY URETER(S), INSERT STENT, COMBINED Right 10/5/2017    Procedure: COMBINED CYSTOSCOPY, URETEROSCOPY, LASER HOLMIUM LITHOTRIPSY URETER(S), INSERT STENT;  Cystoscopy, right ureteroscopy, laser lithotripsy, stent exchange;  Surgeon: Pavel Lundy MD;  Location: [facility]       Social History   Substance Use Topics     Smoking status: Never Smoker     Smokeless tobacco: Never Used     Alcohol use Yes      Comment: on occasion       Most Recent Immunizations   Administered Date(s) Administered     TD (ADULT, 7+) 09/01/2017       BMI: Estimated body mass index is 29.16 kg/(m^2) as calculated from the following:    Height as of 10/5/17: 1.829 m (6').    Weight as of this encounter: 97.5 kg (215 lb).      Review of Systems   Constitutional: Negative for chills and fever.   HENT: Positive for congestion, postnasal drip and rhinorrhea.    Eyes: Negative for redness.   Respiratory: Negative for cough, shortness of breath and wheezing.    Cardiovascular: Negative for chest pain.   Gastrointestinal: Positive for nausea and vomiting. Negative for abdominal pain and diarrhea.   Genitourinary: Negative for difficulty urinating.   Musculoskeletal: Negative for arthralgias and neck stiffness.   Skin: Negative for color change.   Neurological: Negative for headaches.   Psychiatric/Behavioral: Negative for confusion.       Physical Exam   BP: 128/69  Pulse: 109  Resp: 16  Weight: 97.5 kg (215 lb)  SpO2: 96 %    Physical Exam  Head:  Normocephalic.    Eyes:  PERRLA, full EOM.  External exams normal.    Ears:  Normal pinnae, canals, and TM's.    Nose:  Patent, without deformity.    Throat:  Dry mucous membranes without lesions, erythema, or exudate.    Neck:  Supple, without masses, lymphadenopathy or tenderness.    Respiratory:   Normal respiratory effort.  Lungs are clear with good breath sounds.    Heart:  RR without murmurs, rubs, or gallops.    Abdomen: Some reproducible pain right upper mid abdomen.  No guarding or rebound.  Bowel sounds present.  No CVA tenderness    ED Course     ED Course     Procedures                 Results for orders placed or performed during the hospital encounter of 10/07/17 (from the past 24 hour(s))   CBC with platelets differential   Result Value Ref Range    WBC 28.2 (H) 4.0 - 11.0 10e9/L    RBC Count 5.11 4.4 - 5.9 10e12/L    Hemoglobin 14.0 13.3 - 17.7 g/dL    Hematocrit 42.0 40.0 - 53.0 %    MCV 82 78 - 100 fl    MCH 27.4 26.5 - 33.0 pg    MCHC 33.3 31.5 - 36.5 g/dL    RDW 13.5 10.0 - 15.0 %    Platelet Count 150 150 - 450 10e9/L    Diff Method Manual Differential     % Neutrophils 91.0 %    % Lymphocytes 2.0 %    % Monocytes 7.0 %    % Eosinophils 0.0 %    % Basophils 0.0 %    Absolute Neutrophil 25.7 (H) 1.6 - 8.3 10e9/L    Absolute Lymphocytes 0.6 (L) 0.8 - 5.3 10e9/L    Absolute Monocytes 2.0 (H) 0.0 - 1.3 10e9/L    Absolute Eosinophils 0.0 0.0 - 0.7 10e9/L    Absolute Basophils 0.0 0.0 - 0.2 10e9/L    RBC Morphology Consistent with reported results     Platelet Estimate Normal    Comprehensive metabolic panel   Result Value Ref Range    Sodium 138 133 - 144 mmol/L    Potassium 3.8 3.4 - 5.3 mmol/L    Chloride 102 94 - 109 mmol/L    Carbon Dioxide 27 20 - 32 mmol/L    Anion Gap 9 3 - 14 mmol/L    Glucose 117 (H) 70 - 99 mg/dL    Urea Nitrogen 13 7 - 30 mg/dL    Creatinine 0.84 0.66 - 1.25 mg/dL    GFR Estimate >90 >60 mL/min/1.7m2    GFR Estimate If Black >90 >60 mL/min/1.7m2    Calcium 8.7 8.5 - 10.1 mg/dL    Bilirubin Total 0.7 0.2 - 1.3 mg/dL    Albumin 3.1 (L) 3.4 - 5.0 g/dL    Protein Total 6.6 (L) 6.8 - 8.8 g/dL    Alkaline Phosphatase 61 40 - 150 U/L    ALT 27 0 - 70 U/L    AST 10 0 - 45 U/L   Lipase   Result Value Ref Range    Lipase 62 (L) 73 - 393 U/L   UA reflex to Microscopic   Result  Value Ref Range    Color Urine Yellow     Appearance Urine Slightly Cloudy     Glucose Urine Negative NEG^Negative mg/dL    Bilirubin Urine Negative NEG^Negative    Ketones Urine 10 (A) NEG^Negative mg/dL    Specific Gravity Urine 1.018 1.003 - 1.035    Blood Urine Trace (A) NEG^Negative    pH Urine 7.0 5.0 - 7.0 pH    Protein Albumin Urine 30 (A) NEG^Negative mg/dL    Urobilinogen mg/dL Normal 0.0 - 2.0 mg/dL    Nitrite Urine Negative NEG^Negative    Leukocyte Esterase Urine Large (A) NEG^Negative    Source Midstream Urine     RBC Urine 59 (H) 0 - 2 /HPF    WBC Urine 33 (H) 0 - 2 /HPF    Bacteria Urine Few (A) NEG^Negative /HPF    Squamous Epithelial /HPF Urine 4 (H) 0 - 1 /HPF    Mucous Urine Present (A) NEG^Negative /LPF         Assessments & Plan (with Medical Decision Making)  37-year-old male presents with repetitive vomiting.  Status post lithotripsy and right ureteral stent placement as past Thursday.  No fever or chills reported.  Denies dysuria.  Examination noted some mild URI symptoms with rhinorrhea and mild pain in the right upper and mid abdomen.  Reproduced with deep palpation only.  Appears mildly dehydrated  Differential diagnosis could include intolerance to oxycodone causing nausea and vomiting.  3:56 PM  Patient continues to have abdominal pain in the right mid abdomen  extending to the right CVA area.  Intractable nausea and vomiting continues . unable to demonstrate the ability to hold down any oral fluids . pain remained 6/10 intensity .  CT ordered because of persistent pain.  No acute abdominal process identified.  Does have perinephric stranding around the right kidney which would correlate with his high white count and pyuria for pyelonephritis . no obstructive process with no identified hydronephrosis or hydroureter . ureteral stent in proper position .  Discussed admission with Misa Balderas MD - excepting the observation      I have reviewed the nursing notes.    I have reviewed the  findings, diagnosis, plan and need for follow up with the patient.      New Prescriptions    No medications on file       Final diagnoses:   Kidney stone- status post right ureteral stent placement with lithotripsy   Leukocytosis, unspecified type   Urinary tract infection in male       10/7/2017   [facility] EMERGENCY DEPARTMENT     Adam Apple, DO  10/07/17 1135       Adam Apple, DO  10/07/17 1309       Adam Apple, DO  10/07/17 8228

## 2017-10-07 NOTE — ED AVS SNAPSHOT
[facility]    [address] [address]    Phone:  [phone]    Fax:  [phone]                                       Santo Toure   MRN: [id number]    Department:  [facility]   Date of Visit:  10/7/2017           After Visit Summary Signature Page     I have received my discharge instructions, and my questions have been answered. I have discussed any challenges I see with this plan with the nurse or doctor.    ..........................................................................................................................................  Patient/Patient Representative Signature      ..........................................................................................................................................  Patient Representative Print Name and Relationship to Patient    ..................................................               ................................................  Date                                            Time    ..........................................................................................................................................  Reviewed by Signature/Title    ...................................................              ..............................................  Date                                                            Time

## 2017-10-08 LAB — WBC # BLD AUTO: 12.7 10E9/L (ref 4–11)

## 2017-10-08 PROCEDURE — 25000132 ZZH RX MED GY IP 250 OP 250 PS 637: Performed by: EMERGENCY MEDICINE

## 2017-10-08 PROCEDURE — 99225 ZZC SUBSEQUENT OBSERVATION CARE,LEVEL II: CPT | Performed by: FAMILY MEDICINE

## 2017-10-08 PROCEDURE — 96376 TX/PRO/DX INJ SAME DRUG ADON: CPT

## 2017-10-08 PROCEDURE — G0378 HOSPITAL OBSERVATION PER HR: HCPCS

## 2017-10-08 PROCEDURE — 99207 ZZC CDG-CHARGE REQUIRED MANUAL ENTRY: CPT | Performed by: FAMILY MEDICINE

## 2017-10-08 PROCEDURE — 25000128 H RX IP 250 OP 636: Performed by: INTERNAL MEDICINE

## 2017-10-08 PROCEDURE — 25000128 H RX IP 250 OP 636: Performed by: EMERGENCY MEDICINE

## 2017-10-08 PROCEDURE — 85048 AUTOMATED LEUKOCYTE COUNT: CPT | Performed by: INTERNAL MEDICINE

## 2017-10-08 PROCEDURE — 36415 COLL VENOUS BLD VENIPUNCTURE: CPT | Performed by: INTERNAL MEDICINE

## 2017-10-08 PROCEDURE — 96361 HYDRATE IV INFUSION ADD-ON: CPT

## 2017-10-08 RX ADMIN — HYDROMORPHONE HYDROCHLORIDE 0.2 MG: 1 INJECTION, SOLUTION INTRAMUSCULAR; INTRAVENOUS; SUBCUTANEOUS at 00:02

## 2017-10-08 RX ADMIN — POTASSIUM CHLORIDE AND SODIUM CHLORIDE: 900; 150 INJECTION, SOLUTION INTRAVENOUS at 04:42

## 2017-10-08 RX ADMIN — CEFTRIAXONE 1 G: 1 INJECTION, POWDER, FOR SOLUTION INTRAMUSCULAR; INTRAVENOUS at 17:35

## 2017-10-08 RX ADMIN — ACETAMINOPHEN 650 MG: 325 TABLET, FILM COATED ORAL at 22:35

## 2017-10-08 RX ADMIN — ACETAMINOPHEN 650 MG: 325 TABLET, FILM COATED ORAL at 07:02

## 2017-10-08 RX ADMIN — KETOROLAC TROMETHAMINE 30 MG: 30 INJECTION, SOLUTION INTRAMUSCULAR at 08:50

## 2017-10-08 RX ADMIN — ACETAMINOPHEN 650 MG: 325 TABLET, FILM COATED ORAL at 14:14

## 2017-10-08 RX ADMIN — KETOROLAC TROMETHAMINE 30 MG: 30 INJECTION, SOLUTION INTRAMUSCULAR at 16:01

## 2017-10-08 RX ADMIN — POTASSIUM CHLORIDE AND SODIUM CHLORIDE: 900; 150 INJECTION, SOLUTION INTRAVENOUS at 14:18

## 2017-10-08 RX ADMIN — KETOROLAC TROMETHAMINE 30 MG: 30 INJECTION, SOLUTION INTRAMUSCULAR at 21:36

## 2017-10-08 RX ADMIN — HYDROMORPHONE HYDROCHLORIDE 0.2 MG: 1 INJECTION, SOLUTION INTRAMUSCULAR; INTRAVENOUS; SUBCUTANEOUS at 10:06

## 2017-10-08 NOTE — PLAN OF CARE
Problem: Patient Care Overview  Goal: Plan of Care/Patient Progress Review  Outcome: Improving  Pt's WBC improved, afebrile today.  Voiding large amounts of clear yellow urine.  Denies pain or nausea except for headache.  Pt drinks caffeine daily and had not had any for a day, headache improved after drinking soda.   here this morning and this afternoon.  Plan of care reviewed with pt and his family, questions answered.

## 2017-10-08 NOTE — PROGRESS NOTES
Pt called this nurse into room using white board explained urinal use and wanted med for nausea/headache/pain.

## 2017-10-08 NOTE — PROVIDER NOTIFICATION
Pt's pain and nausea uncontrolled. Pt feels oxycodone is contributing to nausea. Page sent to MD.

## 2017-10-08 NOTE — PROGRESS NOTES
[facility] ADMISSION NOTE    Patient admitted to room 2310 at approximately 1800 via cart from emergency room. Patient was accompanied by spouse.     Verbal SBAR report received from RN prior to patient arrival.     Patient ambulated to bed independently. Patient alert and oriented X 3. The patient is not having any pain. 0-10 Pain Scale: 9. Admission vital signs: Blood pressure 103/51, pulse 112, temperature 98.4  F (36.9  C), temperature source Oral, resp. rate 18, height 1.829 m (6'), weight 104.3 kg (229 lb 15 oz), SpO2 96 %. Patient and spouse was oriented to plan of care, call light, bed controls, tv, telephone, bathroom and visiting hours.     The following safety risks were identified during admission: none. Yellow risk band applied: NO.     Khushi Carter

## 2017-10-08 NOTE — PROGRESS NOTES
[facility]   Progress Note October 8, 2017         Impression and Plan:     Santo Toure is a 37 year old male who presents with unrelenting N/V. Fouind to have UTI in the setting of a recent urologic procedure (s/p lithotripsy 10/5 and stent placement 9/14.)     1. Complicated UTI- s/p stent 9/14 and then lithotripsy 10/5. CT does not show any obstruction, stent in place. Continue rocephin since lactate improved. Awaiting culture. Of note, recent UTI was Ecoli sens to all. If not better by Monday, would consider urology consult. Follow [facility] again in am. blood cx pending.  cont flomax     2. Elevated lactate: 3.3-->1.7. Volume depletion from n/v vs sepsis. Suspect resolving sepsis given wbc, lactate, infection source is urine, HR 110s.     3.Asthma - seasonal- albuteral prn     4. Hearing impaired- needs       FEN: IVFs at 125  DVT Prophylaxis:mechanical  Code Status: full  Disposition: Anticipate discharge Monday, awaiting ucx          Interval History:     little better- less pain and no more n/v. Still has chills, headache. toelrating regular diet. Ambulating normal    Seen with   Exam:  Temp:  [96.9  F (36.1  C)-100.2  F (37.9  C)] 97.4  F (36.3  C)  Pulse:  [] 90  Resp:  [16-20] 16  BP: (103-141)/(51-81) 123/68  SpO2:  [90 %-98 %] 94 %  Body mass index is 31.19 kg/(m^2).     General: awake and alert and in nad.   CV: Regular rate and rhythm. No murmur noted  Lungs: Clear to auscultation bilaterally  Abd: Soft, nontender, nondistended, bowel sounds normal  Skin/Ext: Warm and dry. Peripheral edema- none         Data:   All laboratory data reviewed    ROUTINE IP LABS (Last four results)  BMP  Recent Labs  Lab 10/07/17  0825      POTASSIUM 3.8   CHLORIDE 102   JESSIE 8.7   CO2 27   BUN 13   CR 0.84   *     CBC  Recent Labs  Lab 10/08/17  0635 10/07/17  0825   WBC 12.7* 28.2*   RBC  --  5.11   HGB  --  14.0   HCT  --  42.0   MCV  --  82   MCH  --   27.4   MCHC  --  33.3   RDW  --  13.5   PLT  --  150     INRNo lab results found in last 7 days.       Medications:  I have personally reviewed the patient's current medications as documented in EPIC. Any new additions or changes are documented in the assessment and plan.    Attestation:  Amount of time performed on this daily note: 25 minutes.    Radha Garcia

## 2017-10-08 NOTE — PLAN OF CARE
Problem: Patient Care Overview  Goal: Plan of Care/Patient Progress Review  Outcome: Improving  Pt will be on oral pain meds with relief prior to discharge

## 2017-10-08 NOTE — PLAN OF CARE
Problem: Patient Care Overview  Goal: Plan of Care/Patient Progress Review  Outcome: Improving  Pt had complaints of pain and nausea @0000 and received prn IV 0.2 mg Dilaudid and IV Zofran. Pt appeared to sleep well until this AM. C/o headache this AM and requesting jello. Pt given ice pack and Tylenol for HA. Reports pain and nausea improved. Up with SBA. IV

## 2017-10-09 ENCOUNTER — OFFICE VISIT (OUTPATIENT)
Dept: INTERPRETER SERVICES | Facility: CLINIC | Age: 37
End: 2017-10-09

## 2017-10-09 VITALS
RESPIRATION RATE: 18 BRPM | OXYGEN SATURATION: 97 % | TEMPERATURE: 96.6 F | WEIGHT: 229.94 LBS | DIASTOLIC BLOOD PRESSURE: 87 MMHG | HEIGHT: 72 IN | HEART RATE: 53 BPM | SYSTOLIC BLOOD PRESSURE: 136 MMHG | BODY MASS INDEX: 31.14 KG/M2

## 2017-10-09 LAB
ANION GAP SERPL CALCULATED.3IONS-SCNC: 6 MMOL/L (ref 3–14)
APPEARANCE STONE: NORMAL
BACTERIA SPEC CULT: ABNORMAL
BACTERIA SPEC CULT: ABNORMAL
BUN SERPL-MCNC: 11 MG/DL (ref 7–30)
CALCIUM SERPL-MCNC: 8.1 MG/DL (ref 8.5–10.1)
CHLORIDE SERPL-SCNC: 108 MMOL/L (ref 94–109)
CO2 SERPL-SCNC: 25 MMOL/L (ref 20–32)
COMPN STONE: NORMAL
CREAT SERPL-MCNC: 0.76 MG/DL (ref 0.66–1.25)
GFR SERPL CREATININE-BSD FRML MDRD: >90 ML/MIN/1.7M2
GLUCOSE SERPL-MCNC: 109 MG/DL (ref 70–99)
Lab: ABNORMAL
NUMBER STONE: 1
POTASSIUM SERPL-SCNC: 4.1 MMOL/L (ref 3.4–5.3)
SIZE STONE: NORMAL MM
SODIUM SERPL-SCNC: 139 MMOL/L (ref 133–144)
SPECIMEN SOURCE: ABNORMAL
WBC # BLD AUTO: 10.6 10E9/L (ref 4–11)
WT STONE: 35 MG

## 2017-10-09 PROCEDURE — 99231 SBSQ HOSP IP/OBS SF/LOW 25: CPT | Performed by: UROLOGY

## 2017-10-09 PROCEDURE — 25000128 H RX IP 250 OP 636: Performed by: EMERGENCY MEDICINE

## 2017-10-09 PROCEDURE — T1013 SIGN LANG/ORAL INTERPRETER: HCPCS | Mod: U3

## 2017-10-09 PROCEDURE — 85048 AUTOMATED LEUKOCYTE COUNT: CPT | Performed by: FAMILY MEDICINE

## 2017-10-09 PROCEDURE — 36415 COLL VENOUS BLD VENIPUNCTURE: CPT | Performed by: FAMILY MEDICINE

## 2017-10-09 PROCEDURE — 99207 ZZC CDG-CODE CATEGORY CHANGED: CPT | Performed by: FAMILY MEDICINE

## 2017-10-09 PROCEDURE — 25000128 H RX IP 250 OP 636: Performed by: FAMILY MEDICINE

## 2017-10-09 PROCEDURE — G0378 HOSPITAL OBSERVATION PER HR: HCPCS

## 2017-10-09 PROCEDURE — 25000132 ZZH RX MED GY IP 250 OP 250 PS 637: Performed by: EMERGENCY MEDICINE

## 2017-10-09 PROCEDURE — 96376 TX/PRO/DX INJ SAME DRUG ADON: CPT

## 2017-10-09 PROCEDURE — 96361 HYDRATE IV INFUSION ADD-ON: CPT

## 2017-10-09 PROCEDURE — 99217 ZZC OBSERVATION CARE DISCHARGE: CPT | Performed by: FAMILY MEDICINE

## 2017-10-09 PROCEDURE — 96375 TX/PRO/DX INJ NEW DRUG ADDON: CPT

## 2017-10-09 PROCEDURE — 25000128 H RX IP 250 OP 636: Performed by: INTERNAL MEDICINE

## 2017-10-09 PROCEDURE — 80048 BASIC METABOLIC PNL TOTAL CA: CPT | Performed by: FAMILY MEDICINE

## 2017-10-09 RX ORDER — DIPHENHYDRAMINE HYDROCHLORIDE 50 MG/ML
12.5 INJECTION INTRAMUSCULAR; INTRAVENOUS ONCE
Status: COMPLETED | OUTPATIENT
Start: 2017-10-09 | End: 2017-10-09

## 2017-10-09 RX ORDER — CEPHALEXIN 500 MG/1
500 CAPSULE ORAL 3 TIMES DAILY
Qty: 30 CAPSULE | Refills: 0 | Status: SHIPPED | OUTPATIENT
Start: 2017-10-09 | End: 2018-02-05

## 2017-10-09 RX ADMIN — PROCHLORPERAZINE EDISYLATE 10 MG: 5 INJECTION INTRAMUSCULAR; INTRAVENOUS at 10:49

## 2017-10-09 RX ADMIN — KETOROLAC TROMETHAMINE 30 MG: 30 INJECTION, SOLUTION INTRAMUSCULAR at 09:48

## 2017-10-09 RX ADMIN — DIPHENHYDRAMINE HYDROCHLORIDE 12.5 MG: 50 INJECTION, SOLUTION INTRAMUSCULAR; INTRAVENOUS at 10:47

## 2017-10-09 RX ADMIN — POTASSIUM CHLORIDE AND SODIUM CHLORIDE: 900; 150 INJECTION, SOLUTION INTRAVENOUS at 00:33

## 2017-10-09 RX ADMIN — ONDANSETRON 4 MG: 2 INJECTION INTRAMUSCULAR; INTRAVENOUS at 04:20

## 2017-10-09 RX ADMIN — POTASSIUM CHLORIDE AND SODIUM CHLORIDE: 900; 150 INJECTION, SOLUTION INTRAVENOUS at 08:57

## 2017-10-09 RX ADMIN — ACETAMINOPHEN 650 MG: 325 TABLET, FILM COATED ORAL at 03:18

## 2017-10-09 ASSESSMENT — PAIN DESCRIPTION - DESCRIPTORS
DESCRIPTORS: ACHING

## 2017-10-09 ASSESSMENT — ACTIVITIES OF DAILY LIVING (ADL)
BATHING: 0-->INDEPENDENT
RETIRED_EATING: 0-->INDEPENDENT
AMBULATION: 0-->INDEPENDENT
TRANSFERRING: 0-->INDEPENDENT
DRESS: 0-->INDEPENDENT
COGNITION: 0 - NO COGNITION ISSUES REPORTED
SWALLOWING: 0-->SWALLOWS FOODS/LIQUIDS WITHOUT DIFFICULTY
FALL_HISTORY_WITHIN_LAST_SIX_MONTHS: NO
RETIRED_COMMUNICATION: 0-->UNDERSTANDS/COMMUNICATES WITHOUT DIFFICULTY
TOILETING: 0-->INDEPENDENT

## 2017-10-09 NOTE — CONSULTS
Chief complaint: nausea    HPI: admitted for nausea 3 days following right renal stone extraction.    Had been previously stented (9/13/17) secondary to presence of UTI (e coli) when the stone was initially diagnosed.    Stone extraction was without complication.    Scheduled for stent removal on 10/12/17.    Developed Beta hemolytic Streptococcus group B UTI and is being treated with rocephin. He will be discharged on Keflex.    Impression: post operative UTI with stent in place following right ureteral stone extraction.    Plan: discharge on Keflex and report for stent extraction on 10/12/17 as scheduled.    Total time 20 minutes

## 2017-10-09 NOTE — PLAN OF CARE
Problem: Pain, Acute (Adult)  Goal: Identify Related Risk Factors and Signs and Symptoms  Related risk factors and signs and symptoms are identified upon initiation of Human Response Clinical Practice Guideline (CPG).   Outcome: Adequate for Discharge Date Met:  10/09/17  Cont's to deny HA and states RLQ abdominal pain with palpation per MD but is better.  denies flank pain and nausea. States comfort.

## 2017-10-09 NOTE — DISCHARGE SUMMARY
HISTORY OF PRESENT ILLNESS:  Santo Toure is a 37-year-old male with a history of asthma, hearing loss and recent kidney stone.  The patient presented with nausea and vomiting for 24 hours.  He had a right-sided stent placed on 09/14/2017 for a right renal stone.  He then had a lithotripsy on 10/05/2017.  He reported some sweats and hot feelings upon admission.  He noted right-sided back pain when he urinated.  Upon admission he was found to have a white count of 28,200.  CT showed a right renal stent in place.  There was an enlarged right kidney with perinephric soft tissue stranding suggesting some inflammatory changes.  There was no hydronephrosis.  He was treated in the hospital with Rocephin.  His initial lactic acid was elevated at 3.3.  This did normalize.  Ultimately, his urine culture grew out greater than 100,000 colonies of beta hemolytic streptococcus group B.  I saw him first on 10/09/2017.  His nausea and vomiting had gone away.  He was able to eat.  He was complaining of a left parietal headache.  He says that he had a history of migraines as a kid, but apparently has had no recent migraines.  Toradol improve the pain, but he was still reporting 5/10 pain.  I am going to try some Compazine and some Benadryl, both IV.      I also have asked Urology to see him today.  If his headache improves, we may be able to discharge him on some Keflex for UTI.  I believe he has followup on 10/11/2017.      ASSESSMENT:   1.  Complicated urinary tract infection following a right ureteral stenting and lithotripsy for right renal stone.   2.  Protracted nausea and vomiting, probably related to urinary tract infection.   3.  Initial elevated lactic acid.   4.  Persistent headache, etiology unclear, possibly a vascular headache that is secondary to his infection.   5.  History of asthma.   6.  History of hearing impairment.      PLAN:  If his headache comes under control today we may discharge him on Keflex 500 t.i.d.  for 10 days.  He has Urology followup on Wednesday and I am going to have Urology see him today.  We will recheck his status later today and tentatively discharge him if his headache has improved.  He is eating and his overall status is improved.     Discharge Medication List as of 10/9/2017  2:37 PM      START taking these medications    Details   cephALEXin (KEFLEX) 500 MG capsule Take 1 capsule (500 mg) by mouth 3 times daily, Disp-30 capsule, R-0, E-Prescribe      !! ciprofloxacin (CIPRO) 500 MG tablet Take 1 tablet (500 mg) by mouth 2 times daily for 7 days, Disp-14 tablet, R-0, Local Print      promethazine (PHENERGAN) 25 MG tablet Take 1 tablet (25 mg) by mouth every 6 hours as needed for nausea, Disp-10 tablet, R-0, Local Print      !! ciprofloxacin (CIPRO) 500 MG tablet Take 1 tablet (500 mg) by mouth 2 times daily for 7 days, Disp-14 tablet, R-0, E-Prescribe       !! - Potential duplicate medications found. Please discuss with provider.      CONTINUE these medications which have CHANGED    Details   ondansetron (ZOFRAN ODT) 4 MG ODT tab Take 1 tablet (4 mg) by mouth every 6 hours as needed for nausea, Disp-15 tablet, R-0, Local Print         CONTINUE these medications which have NOT CHANGED    Details   oxyCODONE (ROXICODONE) 5 MG IR tablet Take 1 tablet (5 mg) by mouth every 6 hours as needed for pain maximum 4 tablet(s) per day, Disp-10 tablet, R-0, Local Print      ketorolac (TORADOL) 10 MG tablet Take 1 tablet (10 mg) by mouth every 6 hours as needed for moderate pain, Disp-20 tablet, R-0, E-Prescribe      acetaminophen (TYLENOL) 500 MG tablet Take 2 tablets (1,000 mg) by mouth every 6 hours as needed for mild pain or fever, Disp-1 Bottle, R-0, Local Print           Unresulted Labs Ordered in the Past 30 Days of this Admission     Date and Time Order Name Status Description    10/7/2017 1712 Blood culture In process     10/7/2017 1712 Blood culture In process     10/5/2017 1659 Stone analysis In  process               TOTAL TIME SPENT:  Greater than 30 minutes was    spent on this.         DESTINY JULIO MD             D: 10/09/2017 10:43   T: 10/09/2017 16:44   MT: [id number]      Name:     SARAH VILELLA   MRN:      [id number]        Account:        [id number]   :      1980           Admit Date:     [id number]                                  Discharge Date: 10/09/2017      Document: [id number]

## 2017-10-09 NOTE — PROGRESS NOTES
"Dr. Chavez saw pt at this time and stated \"there is no reason to stay in the hospital from a urology standpoint\" and the pt is to not D/C today due to pt's HA and Dr. Nur wanting to cont. IV ABX's.  "

## 2017-10-09 NOTE — DISCHARGE INSTRUCTIONS
Take keflex 500 mg three times daily for urinary track infection. Start tonight.  Continue with oxycodone at home as needed for severe pain only.  Try to limit oxycodone due to risk of GI upset including nausea and vomiting  Follow up with urology on Oct 11 th as scheduled.

## 2017-10-09 NOTE — PROGRESS NOTES
[facility]      Subjective:  Some abd pain on right side--but better  Has a left sided ha, history of migraines as a kid, toradol helps ha--but still rating it about a 5    Review of Systems:  C: NEGATIVE for fever, chills, change in weight  I: NEGATIVE for worrisome rashes, moles or lesions  E: NEGATIVE for vision changes or irritation  E/M: NEGATIVE for ear, mouth and throat problems  R: NEGATIVE for significant cough or SOB  B: NEGATIVE for masses, tenderness or discharge  CV: NEGATIVE for chest pain, palpitations or peripheral edema  GI:some right sided abd pain  : NEGATIVE for frequency, dysuria, or hematuria  M: NEGATIVE for significant arthralgias or myalgia  N: left sided ha  E: NEGATIVE for temperature intolerance, skin/hair changes  H: NEGATIVE for bleeding problems  P: NEGATIVE for changes in mood or affect    Physical Exam:  Vitals Were Reviewed    Patient Vitals for the past 16 hrs:   BP Temp Temp src Pulse Resp SpO2   10/09/17 0859 (!) 154/105 97.9  F (36.6  C) Oral - 18 100 %   10/09/17 0322 130/90 97.6  F (36.4  C) Oral 65 18 95 %   10/08/17 2311 124/75 99.1  F (37.3  C) Oral 80 18 95 %         Intake/Output Summary (Last 24 hours) at 10/09/17 1010  Last data filed at 10/09/17 1000   Gross per 24 hour   Intake             3480 ml   Output             4200 ml   Net             -720 ml       GENERAL APPEARANCE: healthy, alert and no distress  EYES: conjunctiva clear, eyes grossly normal  RESP: lungs clear to auscultation - no rales, rhonchi or wheezes  CV: regular rate and rhythm, normal S1 S2, no S3 or S4 and no murmur, click or rub   ABDOMEN: mild right mid abd tenderness , no rebound, bs normal  MS: no clubbing, cyanosis; no edema  SKIN: clear without significant rashes or lesions  NEURO: Normal strength and tone, sensory exam grossly normal, mentation intact and speech normal    Lab:  Recent Labs   Lab Test  10/09/17   0635  10/07/17   0825   NA  139  138   POTASSIUM  4.1   3.8   CHLORIDE  108  102   CO2  25  27   ANIONGAP  6  9   GLC  109*  117*   BUN  11  13   CR  0.76  0.84   JESSIE  8.1*  8.7     CBC RESULTS:   Recent Labs   Lab Test  10/09/17   0635  10/08/17   0635  10/07/17   0825  09/15/17   0637   WBC  10.6  12.7*  28.2*  12.0*   RBC   --    --   5.11  4.26*   HGB   --    --   14.0  11.6*   HCT   --    --   42.0  36.0*   PLT   --    --   150  121*       Results for orders placed or performed during the hospital encounter of 10/07/17 (from the past 24 hour(s))   WBC count   Result Value Ref Range    WBC 10.6 4.0 - 11.0 10e9/L   Basic metabolic panel   Result Value Ref Range    Sodium 139 133 - 144 mmol/L    Potassium 4.1 3.4 - 5.3 mmol/L    Chloride 108 94 - 109 mmol/L    Carbon Dioxide 25 20 - 32 mmol/L    Anion Gap 6 3 - 14 mmol/L    Glucose 109 (H) 70 - 99 mg/dL    Urea Nitrogen 11 7 - 30 mg/dL    Creatinine 0.76 0.66 - 1.25 mg/dL    GFR Estimate >90 >60 mL/min/1.7m2    GFR Estimate If Black >90 >60 mL/min/1.7m2    Calcium 8.1 (L) 8.5 - 10.1 mg/dL       Assessment and Plan:    Santo Toure is a 37 year old male who presents with unrelenting N/V. Fouind to have UTI in the setting of a recent urologic procedure (s/p lithotripsy 10/5 and stent placement 9/14.)      Complicated UTI-beta hemolytic strep group b- s/p stent 9/14 and then lithotripsy 10/5. CT does not show any obstruction, stent in place. Continue rocephin since lactate improved. Awaiting culture. Of note, recent UTI was Ecoli sens to all. If not better by Monday, would consider urology consult. Follow [facility] again in am. blood cx pending.  cont flomax  October 9, 2017 afebrile, wbc normal, on rocephin, have requested urology to see him      Elevated lactate: 3.3-->1.7. Volume depletion from n/v vs sepsis. Suspect resolving sepsis given wbc, lactate, infection source is urine, HR 110s.     Asthma - seasonal- albuteral prn      Hearing impaired- needs        FEN: lock  DVT Prophylaxis:mechanical  Code  Status: full  Disposition:   Headache is the problem now.Will try compazine and benadryl iv. This might be a vascular ha secondary to sepsis/infection.  Could dc if this is better.  Have asked urology to see him.       1:56 PM  Ascencio gone  Will dc

## 2017-10-09 NOTE — PLAN OF CARE
Problem: Patient Care Overview  Goal: Plan of Care/Patient Progress Review  Outcome: Improving  Pt c/o headache overnight. Given  mg Tylenol x1 and pt also used a cool washcloth to his forehead. He later c/o of nausea, given PRN IV Zofran. Pt appeared sleeping between cares. Vital signs are stable, afebrile. Voiding without difficulty. Pt using white board at bedside to communicate when   is not present. Pt able to make his needs be known.

## 2017-10-09 NOTE — PHARMACY - DISCHARGE MEDICATION RECONCILIATION
Discharge medication review for this patient is complete. Pharmacist assisted with medication reconciliation of discharge medications with prior to admission medications.     The following changes were made to the discharge medication list based on pharmacist review:  Added:  none  Discontinued: cipro after speaking with MD  Changed: none      Patient's Discharge Medication List  - medications as listed on After Visit Summary (AVS)     Review of your medicines      START taking       Dose / Directions    cephALEXin 500 MG capsule   Commonly known as:  KEFLEX        Dose:  500 mg   Take 1 capsule (500 mg) by mouth 3 times daily   Quantity:  30 capsule   Refills:  0       ciprofloxacin 500 MG tablet   Commonly known as:  CIPRO        Dose:  500 mg   Take 1 tablet (500 mg) by mouth 2 times daily for 7 days   Quantity:  14 tablet   Refills:  0       promethazine 25 MG tablet   Commonly known as:  PHENERGAN        Dose:  25 mg   Take 1 tablet (25 mg) by mouth every 6 hours as needed for nausea   Quantity:  10 tablet   Refills:  0         CONTINUE these medicines which may have CHANGED, or have new prescriptions. If we are uncertain of the size of tablets/capsules you have at home, strength may be listed as something that might have changed.       Dose / Directions    ondansetron 4 MG ODT tab   Commonly known as:  ZOFRAN ODT   This may have changed:  reasons to take this        Dose:  4 mg   Take 1 tablet (4 mg) by mouth every 6 hours as needed for nausea   Quantity:  15 tablet   Refills:  0         CONTINUE these medicines which have NOT CHANGED       Dose / Directions    acetaminophen 500 MG tablet   Commonly known as:  TYLENOL   Used for:  Calculi, ureter        Dose:  1000 mg   Take 2 tablets (1,000 mg) by mouth every 6 hours as needed for mild pain or fever   Quantity:  1 Bottle   Refills:  0       ketorolac 10 MG tablet   Commonly known as:  TORADOL   Used for:  Calculi, ureter        Dose:  10 mg   Take 1 tablet  (10 mg) by mouth every 6 hours as needed for moderate pain   Quantity:  20 tablet   Refills:  0       oxyCODONE 5 MG IR tablet   Commonly known as:  ROXICODONE   Used for:  Kidney stone        Dose:  5 mg   Take 1 tablet (5 mg) by mouth every 6 hours as needed for pain maximum 4 tablet(s) per day   Quantity:  10 tablet   Refills:  0            Where to get your medicines      These medications were sent to [facility] [place], [place] - [address]  [address] [address]     Phone:  [phone]      cephALEXin 500 MG capsule         Some of these will need a paper prescription and others can be bought over the counter. Ask your nurse if you have questions.     Bring a paper prescription for each of these medications      ciprofloxacin 500 MG tablet     ondansetron 4 MG ODT tab     promethazine 25 MG tablet

## 2017-10-11 ENCOUNTER — OFFICE VISIT (OUTPATIENT)
Dept: UROLOGY | Facility: CLINIC | Age: 37
End: 2017-10-11
Payer: COMMERCIAL

## 2017-10-11 VITALS — RESPIRATION RATE: 16 BRPM | HEART RATE: 80 BPM | SYSTOLIC BLOOD PRESSURE: 142 MMHG | DIASTOLIC BLOOD PRESSURE: 92 MMHG

## 2017-10-11 DIAGNOSIS — N20.0 KIDNEY STONE: Primary | ICD-10-CM

## 2017-10-11 PROCEDURE — 99207 ZZC NO CHARGE LOS: CPT | Performed by: UROLOGY

## 2017-10-11 PROCEDURE — 52310 CYSTOSCOPY AND TREATMENT: CPT | Mod: 58 | Performed by: UROLOGY

## 2017-10-11 PROCEDURE — T1013 SIGN LANG/ORAL INTERPRETER: HCPCS | Mod: U3 | Performed by: UROLOGY

## 2017-10-11 RX ORDER — AMOXICILLIN 500 MG/1
500 CAPSULE ORAL 3 TIMES DAILY
Qty: 30 CAPSULE | Refills: 0 | Status: CANCELLED | OUTPATIENT
Start: 2017-10-11

## 2017-10-11 NOTE — NURSING NOTE
Chief Complaint   Patient presents with     Cystoscopy       Initial BP (!) 142/92 (BP Location: Right arm, Patient Position: Chair, Cuff Size: Adult Regular)  Pulse 80  Resp 16 Estimated body mass index is 31.19 kg/(m^2) as calculated from the following:    Height as of 10/7/17: 1.829 m (6').    Weight as of 10/7/17: 104.3 kg (229 lb 15 oz).  Medication Reconciliation: complete.  gutierrez vogt LPN

## 2017-10-11 NOTE — MR AVS SNAPSHOT
After Visit Summary   10/11/2017    Santo Toure    MRN: [id number]           Patient Information     Date Of Birth          1980        Visit Information        Provider Department      10/11/2017 2:00 PM Samra Saucedo Christopher Allan, MD [facility]        Today's Diagnoses     Kidney stone    -  1       Follow-ups after your visit        Who to contact     If you have questions or need follow up information about today's clinic visit or your schedule please contact [facility] directly at [phone].  Normal or non-critical lab and imaging results will be communicated to you by [employer], letter or phone within 4 business days after the clinic has received the results. If you do not hear from us within 7 days, please contact the clinic through [employer] or phone. If you have a critical or abnormal lab result, we will notify you by phone as soon as possible.  Submit refill requests through [email or web address] or call your pharmacy and they will forward the refill request to us. Please allow 3 business days for your refill to be completed.          Additional Information About Your Visit        MyChart Information     [email or web address] gives you secure access to your electronic health record. If you see a primary care provider, you can also send messages to your care team and make appointments. If you have questions, please call your primary care clinic.  If you do not have a primary care provider, please call [phone] and they will assist you.        Care EveryWhere ID     This is your Care EveryWhere ID. This could be used by other organizations to access your [facility] medical records  [id number]        Your Vitals Were     Pulse Respirations                80 16           Blood Pressure from Last 3 Encounters:   10/11/17 (!) 142/92   10/09/17 136/87   10/06/17 126/70    Weight from Last 3 Encounters:   10/07/17 104.3 kg (229 lb 15 oz)   10/06/17 102.7 kg (226 lb 6.4 oz)    10/05/17 97.5 kg (215 lb)              We Performed the Following     CYSTOSCOPY W FOREIGN BODY REMOVAL, SIMPLE        Primary Care Provider Office Phone # Fax #    [facility] [phone] [address] [place] [address]        Equal Access to Services     ANTONINA ALBA : Hadii teo ku hadasho Soomaali, waaxda luqadaha, qaybta kaalmada adeegyada, waxay kareenin hayjanebetty díaz chrissushila rich. So [facility] [phone].    ATENCIÓN: Si habla español, tiene a marshall disposición servicios gratuitos de asistencia lingüística. Sarahi al [phone].    We comply with applicable federal civil rights laws and [place] laws. We do not discriminate on the basis of race, color, national origin, age, disability, sex, sexual orientation, or gender identity.            Thank you!     Thank you for choosing [facility]  for your care. Our goal is always to provide you with excellent care. Hearing back from our patients is one way we can continue to improve our services. Please take a few minutes to complete the written survey that you may receive in the mail after your visit with us. Thank you!             Your Updated Medication List - Protect others around you: Learn how to safely use, store and throw away your medicines at www.disposemymeds.org.          This list is accurate as of: 10/11/17  5:53 PM.  Always use your most recent med list.                   Brand Name Dispense Instructions for use Diagnosis    acetaminophen 500 MG tablet    TYLENOL    1 Bottle    Take 2 tablets (1,000 mg) by mouth every 6 hours as needed for mild pain or fever    Calculi, ureter       cephALEXin 500 MG capsule    KEFLEX    30 capsule    Take 1 capsule (500 mg) by mouth 3 times daily    Kidney stone       ciprofloxacin 500 MG tablet    CIPRO    14 tablet    Take 1 tablet (500 mg) by mouth 2 times daily for 7 days    Kidney stone       ketorolac 10 MG tablet    TORADOL    20 tablet    Take 1 tablet  (10 mg) by mouth every 6 hours as needed for moderate pain    Calculi, ureter       oxyCODONE 5 MG IR tablet    ROXICODONE    10 tablet    Take 1 tablet (5 mg) by mouth every 6 hours as needed for pain maximum 4 tablet(s) per day    Kidney stone       promethazine 25 MG tablet    PHENERGAN    10 tablet    Take 1 tablet (25 mg) by mouth every 6 hours as needed for nausea

## 2017-10-12 NOTE — PROGRESS NOTES
REASON FOR VISIT TODAY:  Stent removal.      BRIEF COURSE:  Mr. Santo Toure is a 37-year-old gentleman followed in our clinic for history of recent kidney stone.  The patient presents for stent removal today after having undergone ureteroscopy and laser lithotripsy on 10/17/2017.  The patient did present to the Emergency Department a couple days later where it was felt he had a urinary tract infection growing out group B strep.  The patient has been on antibiotics for this for the past few days.  The patient is currently taking Keflex at 500 mg p.o. t.i.d.  He notes that while taking the antibiotics, he has developed some diarrhea and headache and some back pain.      PROCEDURE:  After informed consent was obtained the patient was prepped and draped in standard sterile fashion.  A flexible cystoscope was introduced into the patient's bladder and the stent could be seen emanating from UO.  This was secured to grasp with a grasper and removed without difficulty.  The patient tolerated the procedure without difficulty.  There were no immediate complications noted.      The patient's stone was made of calcium oxalate.      ASSESSMENT AND PLAN:  I counseled the patient that since this is his first stone episode that we would make the generic recommendations to increase his fluid intake such that he has a urine output of 2 to 2.5 liters a day, to increase his consumption of citrate-containing beverages and to cut down on salt intake and to limit oxalate containing foods.  I further counseled the patient that with regard to his symptoms of diarrhea and headache with antibiotics, I suggested the patient can cut back to taking the Keflex 500 mg twice daily instead of 3 times daily, as this will likely still be efficacious, but hopefully be more tolerable.  The patient also tried eating some yogurt probiotics to help restore gut sahara.         STEVE DAVID MD             D: 10/11/2017 17:53   T: 10/12/2017 09:14    MT: NANCY#[id number]      Name:     SARAH VILLELA   MRN:      [id number]        Account:      [id number]   :      1980           Visit Date:   10/11/2017      Document: [id number]

## 2017-10-13 LAB
BACTERIA SPEC CULT: NORMAL
BACTERIA SPEC CULT: NORMAL
Lab: NORMAL
Lab: NORMAL
SPECIMEN SOURCE: NORMAL
SPECIMEN SOURCE: NORMAL

## 2018-02-05 ENCOUNTER — OFFICE VISIT (OUTPATIENT)
Dept: FAMILY MEDICINE | Facility: CLINIC | Age: 38
End: 2018-02-05
Payer: COMMERCIAL

## 2018-02-05 VITALS
HEART RATE: 94 BPM | DIASTOLIC BLOOD PRESSURE: 100 MMHG | TEMPERATURE: 98.4 F | BODY MASS INDEX: 32.78 KG/M2 | HEIGHT: 72 IN | SYSTOLIC BLOOD PRESSURE: 148 MMHG | WEIGHT: 242 LBS

## 2018-02-05 DIAGNOSIS — M26.609 TEMPOROMANDIBULAR JOINT DISORDER: Primary | ICD-10-CM

## 2018-02-05 PROCEDURE — 99213 OFFICE O/P EST LOW 20 MIN: CPT | Performed by: NURSE PRACTITIONER

## 2018-02-05 RX ORDER — CYCLOBENZAPRINE HCL 10 MG
5-10 TABLET ORAL 3 TIMES DAILY PRN
Qty: 30 TABLET | Refills: 1 | Status: SHIPPED | OUTPATIENT
Start: 2018-02-05 | End: 2020-03-10

## 2018-02-05 NOTE — LETTER
[facility]  [address] [place] [id number]  Phone: [phone]  Fax: [phone]    February 5, 2018        Santo Toure  [address] [address]          To whom it may concern:    RE: Santo Toure    Patient was seen and treated today at our clinic and missed work.    Please contact me for questions or concerns.      Sincerely,        ROSSY Wynne CNP

## 2018-02-05 NOTE — MR AVS SNAPSHOT
After Visit Summary   2/5/2018    Santo Toure    MRN: [id number]           Patient Information     Date Of Birth          1980        Visit Information        Provider Department      2/5/2018 2:20 PM Ina May APRN CNP; IDANIA MUHAMMAD [facility]        Today's Diagnoses     Temporomandibular joint disorder    -  1      Care Instructions    We will treat with a course of muscle relaxers Flexeril 5-10 mg 3 times a day as needed.  Continue to monitor if not improving contact me and we will put in a referral for ENT TMJ clinic.    Follow-up with your primary care provider next week and as needed.    Indications for emergent return to emergency department discussed with patient, who verbalized good understanding and agreement.  Patient understands the limitations of today's evaluation.         When You Have Temporomandibular Disorder (TMD)  The temporomandibular joint (TMJ) is a ball-and-socket joint located where the upper and lower jaws meet. The TMJ and its nearby muscles make up a complex, loosely connected system. Because of this, a problem in one part of the system can affect the other parts. This can cause you to have temporomandibular disorder (TMD).         How the temporomandibular joint works  You have 1 joint on each side of your mouth that together make up the TMJ. These joints are part of a large group of muscles, ligaments, and bones that work together as a system. When the system is healthy, you can talk, chew, and even yawn in comfort. Muscles contract and relax to open and close the joint. The disk is made of cartilage and is located between the condyle and the skull. It absorbs pressure in the joint and allows the jaws to open and close smoothly. Fluid (called synovial fluid) lubricates the joints. Ligaments connect the lower jaw bones to the skull. They also support the joint.  Common temporomandibular problems  When there is a problem with the TMJ and its  related system, you can develop TMD. Common TMD problems include tight muscles, inflamed joints, and damaged joints.  In some cases, symptoms may be related to the teeth or bite.    Tight muscles. The muscles surrounding the TMJ can go into spasm (tighten) and cause pain. Referred pain happens in a part of the body separate from the source of the problem. For example, pain in the face or teeth could be coming from a problem in the TMJ. Myofascial pain happens in soft tissues, like muscle. Trigger points in these pain areas often cause referred pain. You may feel jaw, neck, or shoulder pain.    Inflamed joints. Inflammation may include pain, redness, heat, swelling, or loss of function. Synovitis happens when certain tissues surrounding the TMJ become inflamed. It causes pain that increases with jaw movement. Inflamed ligamentscan be caused by strain or injury. When this happens, the ligaments are unable to support the joint. Rheumatoid arthritis is a joint disease. It leads to inflammation in the TMJ.    Damaged joints. Many people hear clicking when their jaw moves. If you feel pain along with the noise, the joint may be damaged. Impingement happens when the disk slips out of place (displacement). This causes the jaw to catch. As the disk slips, you may hear a clicking sound. Locked jaw happens when the disk gets stuck in one position. As a result, the jaw locks open or closed. Osteoarthritis is a joint disease. It causes the TMJ to wear away (degenerate). This leads to pain during movement.     Other problems  The parts of the jaw and mouth make up a single unit. That s why a problem in 1 area can cause symptoms elsewhere. Teeth or bite problems associated with TMD include:    Bruxism (grinding your teeth side to side)    Clenching (biting down on your teeth)    Malocclusion (when the teeth or bite is out of alignment)  Your health care provider will give you more information about these problems if needed.   Date  Last Reviewed: 7/13/2015 2000-2017 [employer]. [address] [place] [address], [place] [address]. All rights reserved. This information is not intended as a substitute for professional medical care. Always follow your healthcare professional's instructions.        TMJ Syndrome  The temporomandibular joint (TMJ) is the joint that connects your lower jaw to your head. You can feel it in front of your ears when you open and close your mouth. TMJ disorders involve chronic or recurrent pain in the joint. When treated, symptoms of TMJ disorders usually go away within a few months.  Causes  There is no widely agreed-on cause of TMJ disorders. They have been linked to injury, arthritis, chronic fatigue syndrome, and fibromyalgia. A definite connection has not been shown, though.  Symptoms    Pain in the face, jaw, or neck    Pain with jaw movement or chewing    Locking or catching sensation of the jaw    Clicking, popping, or grinding sounds with movement of the TMJ    Headache    Ear pain  Home care  Modest, nonsurgical treatments are a good first step toward relieving symptoms. Try the approaches described below.    Rest the jaw by avoiding crunchy or hard-to-chew foods. Do not eat hard or sticky candies. Soft foods and liquids are easier on the jaw.    Protect your jaw while yawning. If you need to yawn, put your fist under your chin to prevent your mouth from opening up too wide.    To help relieve pain, try applying hot or cold packs to the painful area. Try both hot and cold to find out which works best for you. If you use hot packs (small towels soaked in hot water), be careful not to burn yourself.    You may take acetaminophen or ibuprofen for pain, unless you were given a different pain medicine. (Note: If you have chronic liver or kidney disease or have ever had a stomach ulcer or gastrointestinal bleeding, talk with your healthcare provider before using these medicines. Also talk to your provider if  you are taking medicine to prevent blood clots.) Aspirin should never be given to anyone younger than 18 years of age who is ill with a viral infection or fever. It may cause severe liver or brain damage.  Reducing stress  If stress seems to be contributing to your symptoms, try to identify the sources of stress in your life. These aren t always obvious. Common stressors include:    Everyday hassles (which can add up), such as traffic jams, missed appointments, or car trouble    Major life changes, both good (such as a new baby or job promotion) and bad (loss of job or loss of a loved one)    Overload: The feeling that you have too many responsibilities and can't take care of everything at once    Helplessness: Feeling like your problems are more than you can solve  When possible, do something about your sources of stress. See if you can avoid hassles, limit the amount of change in your life at one time, and take breaks when you feel overloaded.  Unfortunately, many stressful situations cannot be avoided. So learning how to manage stress better is very important. Getting regular exercise, eating nutritious, balanced meals, and getting adequate rest all help to make everyday stress more manageable. Certain techniques are also helpful: relaxation and breathing exercises, visualization, biofeedback, meditation, or simply taking some time out to clear your mind. For more information, talk with your healthcare provider.  Follow-up care  Follow up with your healthcare provider, or as advised. Further testing and additional treatment may be required. If changes to your lifestyle do not improve your symptoms, talk with your healthcare provider about other available therapies. These include bite guards for help with teeth grinding, stress management techniques, and more. If stress is an important factor and does not respond to the above simple measures, talk to your doctor about a referral for stress management.    If X-rays  were done, they will be reviewed by a specialist. You will be notified of the results, especially if they affect treatment.  Call 911  Call emergency services right away if any of these occur:    Trouble breathing or swallowing, wheezing    Confusion    Extreme drowsiness or trouble awakening    Fainting or loss of consciousness    Rapid heart rate  When to seek medical advice  Call your healthcare provider right away if any of these occur:    Your face becomes swollen or red.    Your pain worsens.    You have increasing neck, mouth, tooth, or throat pain.    You develop a fever of 100.4F (38 C) or higher  Date Last Reviewed: 7/30/2015 2000-2017 [employer]. [address] [place] [address]. All rights reserved. This information is not intended as a substitute for professional medical care. Always follow your healthcare professional's instructions.        Self-Care for Temporomandibular Disorders (TMD)  You have temporomandibular disorder (TMD). This term describes a group of problems related to the temporomandibular joint (TMJ) and nearby muscles. The TMJ is located where the upper and lower jaws meet. Treatment will get your jaw back to normal function. But your care doesn t end there. Once you ve had TMD, it s important to avoid reinjury. Get in the habit of doing self-checks. This can make you aware of any symptoms that begin to return, so you can take action right away.    Doing self-checks  Make it a habit to assess your body a few times each day. Try writing yourself a reminder. Or set an alarm on your watch or computer. When doing a self-check, ask yourself:    Do I feel stressed?    Are my muscles tense?    Am I grinding or clenching my teeth?    Is my posture healthy for my body?    Is there anything I can do to make myself more comfortable?  If you answer  yes  to any of the questions above, you need to take action. Adjusting your posture or taking a short break can help prevent  or relieve TMD symptoms.  Listening to your body  Many people get used to ignoring pain. But pain is a signal that your body needs care. To maintain your TMJ health:    Avoid hard or chewy foods. Even if you feel fine, eating such foods can trigger symptoms again.    Be aware of your body. Don t ignore TMD symptoms. The nagging pain in your neck or jaw may be a sign that you need care.    Be sure to keep follow-up appointments with your health care team.  Managing stress  Stress is a key factor in TMD. Stress can cause you to clench your muscles or grind your teeth. It can also affect your sleep, reducing your body s ability to heal. Here are a few tips to manage stress:    Learn ways to relax. Try listening to music or gently stretching. Take a few slow deep breaths. Or, close your eyes and imagine a place or object that is calming.    Get plenty of rest and sleep.    Set goals you know you can attain.    Make time for people and things you enjoy.    Ask for help if you need it. Friends and family can run errands and cook meals for you.   Staying active  Activity helps the body in many ways. You stay looser and more relaxed. It also helps keep muscles and tissues conditioned. That way you can heal faster and make reinjury less likely. Here are some tips to get you started:    Talk to your health care provider before starting an exercise program.    Always warm up and stretch before each activity. This helps prevent injury.    Try walking or swimming. These activities are easy on your joints. They also benefit your heart and lungs.    Try yoga or valeriano chi. These are relaxing activities known for reducing stress.  Date Last Reviewed: 7/13/2015 2000-2017 [employer]. [address] [place] [address], [place] [address]. All rights reserved. This information is not intended as a substitute for professional medical care. Always follow your healthcare professional's instructions.                Follow-ups after your  visit        Your next 10 appointments already scheduled     Feb 06, 2018  8:20 AM CST   SHORT with Charbel Cloud PA-C   [facility] ([facility])    [address] [place] [id number] [phone]              Who to contact     If you have questions or need follow up information about today's clinic visit or your schedule please contact [facility] directly at [phone].  Normal or non-critical lab and imaging results will be communicated to you by [employer], letter or phone within 4 business days after the clinic has received the results. If you do not hear from us within 7 days, please contact the clinic through invinot or phone. If you have a critical or abnormal lab result, we will notify you by phone as soon as possible.  Submit refill requests through [email or web address] or call your pharmacy and they will forward the refill request to us. Please allow 3 business days for your refill to be completed.          Additional Information About Your Visit        [employer] Information     [email or web address] gives you secure access to your electronic health record. If you see a primary care provider, you can also send messages to your care team and make appointments. If you have questions, please call your primary care clinic.  If you do not have a primary care provider, please call [phone] and they will assist you.        Care EveryWhere ID     This is your Care EveryWhere ID. This could be used by other organizations to access your [place] medical records  [id number]        Your Vitals Were     Pulse Temperature Height BMI (Body Mass Index)          94 98.4  F (36.9  C) (Tympanic) 6' (1.829 m) 32.82 kg/m2         Blood Pressure from Last 3 Encounters:   02/05/18 (!) 148/100   10/11/17 (!) 142/92   10/09/17 136/87    Weight from Last 3 Encounters:   02/05/18 242 lb (109.8 kg)   10/07/17 229 lb 15 oz (104.3 kg)   10/06/17 226 lb 6.4 oz (102.7 kg)              Today,  you had the following     No orders found for display         Today's Medication Changes          These changes are accurate as of 2/5/18  2:45 PM.  If you have any questions, ask your nurse or doctor.               Start taking these medicines.        Dose/Directions    cyclobenzaprine 10 MG tablet   Commonly known as:  FLEXERIL   Used for:  Temporomandibular joint disorder   Started by:  Ina May APRN CNP        Dose:  5-10 mg   Take 0.5-1 tablets (5-10 mg) by mouth 3 times daily as needed for muscle spasms   Quantity:  30 tablet   Refills:  1            Where to get your medicines      These medications were sent to [facility], [place] [id number]     Phone:  [phone]     cyclobenzaprine 10 MG tablet                Primary Care Provider Office Phone # Fax #    [facility] [phone] [address] [place] [address]        Equal Access to Services     ANTONINA ALBA : Hadii teo barrios hadasho Soomaali, waaxda luqadaha, qaybta kaalmada adeegyada, mookie briggs . So [facility] [phone].    ATENCIÓN: Si kimberly blakely, tiene a marshall disposición servicios gratuitos de asistencia lingüística. Llame al [phone].    We comply with applicable federal civil rights laws and [place] laws. We do not discriminate on the basis of race, color, national origin, age, disability, sex, sexual orientation, or gender identity.            Thank you!     Thank you for choosing [facility]  for your care. Our goal is always to provide you with excellent care. Hearing back from our patients is one way we can continue to improve our services. Please take a few minutes to complete the written survey that you may receive in the mail after your visit with us. Thank you!             Your Updated Medication List - Protect others around you: Learn how to safely use,  store and throw away your medicines at www.disposemymeds.org.          This list is accurate as of 2/5/18  2:45 PM.  Always use your most recent med list.                   Brand Name Dispense Instructions for use Diagnosis    acetaminophen 500 MG tablet    TYLENOL    1 Bottle    Take 2 tablets (1,000 mg) by mouth every 6 hours as needed for mild pain or fever    Calculi, ureter       cyclobenzaprine 10 MG tablet    FLEXERIL    30 tablet    Take 0.5-1 tablets (5-10 mg) by mouth 3 times daily as needed for muscle spasms    Temporomandibular joint disorder       ketorolac 10 MG tablet    TORADOL    20 tablet    Take 1 tablet (10 mg) by mouth every 6 hours as needed for moderate pain    Calculi, ureter       oxyCODONE IR 5 MG tablet    ROXICODONE    10 tablet    Take 1 tablet (5 mg) by mouth every 6 hours as needed for pain maximum 4 tablet(s) per day    Kidney stone       promethazine 25 MG tablet    PHENERGAN    10 tablet    Take 1 tablet (25 mg) by mouth every 6 hours as needed for nausea

## 2018-02-05 NOTE — NURSING NOTE
No chief complaint on file.      Initial BP (!) 148/100  Pulse 94  Temp 98.4  F (36.9  C) (Tympanic)  Ht 6' (1.829 m)  Wt 242 lb (109.8 kg)  BMI 32.82 kg/m2 Estimated body mass index is 32.82 kg/(m^2) as calculated from the following:    Height as of this encounter: 6' (1.829 m).    Weight as of this encounter: 242 lb (109.8 kg).  Medication Reconciliation: complete   Charleen Wiggins, CMA

## 2018-02-05 NOTE — PATIENT INSTRUCTIONS
We will treat with a course of muscle relaxers Flexeril 5-10 mg 3 times a day as needed.  Continue to monitor if not improving contact me and we will put in a referral for ENT TMJ clinic.    Follow-up with your primary care provider next week and as needed.    Indications for emergent return to emergency department discussed with patient, who verbalized good understanding and agreement.  Patient understands the limitations of today's evaluation.         When You Have Temporomandibular Disorder (TMD)  The temporomandibular joint (TMJ) is a ball-and-socket joint located where the upper and lower jaws meet. The TMJ and its nearby muscles make up a complex, loosely connected system. Because of this, a problem in one part of the system can affect the other parts. This can cause you to have temporomandibular disorder (TMD).         How the temporomandibular joint works  You have 1 joint on each side of your mouth that together make up the TMJ. These joints are part of a large group of muscles, ligaments, and bones that work together as a system. When the system is healthy, you can talk, chew, and even yawn in comfort. Muscles contract and relax to open and close the joint. The disk is made of cartilage and is located between the condyle and the skull. It absorbs pressure in the joint and allows the jaws to open and close smoothly. Fluid (called synovial fluid) lubricates the joints. Ligaments connect the lower jaw bones to the skull. They also support the joint.  Common temporomandibular problems  When there is a problem with the TMJ and its related system, you can develop TMD. Common TMD problems include tight muscles, inflamed joints, and damaged joints.  In some cases, symptoms may be related to the teeth or bite.    Tight muscles. The muscles surrounding the TMJ can go into spasm (tighten) and cause pain. Referred pain happens in a part of the body separate from the source of the problem. For example, pain in the face  or teeth could be coming from a problem in the TMJ. Myofascial pain happens in soft tissues, like muscle. Trigger points in these pain areas often cause referred pain. You may feel jaw, neck, or shoulder pain.    Inflamed joints. Inflammation may include pain, redness, heat, swelling, or loss of function. Synovitis happens when certain tissues surrounding the TMJ become inflamed. It causes pain that increases with jaw movement. Inflamed ligamentscan be caused by strain or injury. When this happens, the ligaments are unable to support the joint. Rheumatoid arthritis is a joint disease. It leads to inflammation in the TMJ.    Damaged joints. Many people hear clicking when their jaw moves. If you feel pain along with the noise, the joint may be damaged. Impingement happens when the disk slips out of place (displacement). This causes the jaw to catch. As the disk slips, you may hear a clicking sound. Locked jaw happens when the disk gets stuck in one position. As a result, the jaw locks open or closed. Osteoarthritis is a joint disease. It causes the TMJ to wear away (degenerate). This leads to pain during movement.     Other problems  The parts of the jaw and mouth make up a single unit. That s why a problem in 1 area can cause symptoms elsewhere. Teeth or bite problems associated with TMD include:    Bruxism (grinding your teeth side to side)    Clenching (biting down on your teeth)    Malocclusion (when the teeth or bite is out of alignment)  Your health care provider will give you more information about these problems if needed.   Date Last Reviewed: 7/13/2015 2000-2017 [employer]. [address] [place] [id number]. All rights reserved. This information is not intended as a substitute for professional medical care. Always follow your healthcare professional's instructions.        TMJ Syndrome  The temporomandibular joint (TMJ) is the joint that connects your lower jaw to your head. You can  feel it in front of your ears when you open and close your mouth. TMJ disorders involve chronic or recurrent pain in the joint. When treated, symptoms of TMJ disorders usually go away within a few months.  Causes  There is no widely agreed-on cause of TMJ disorders. They have been linked to injury, arthritis, chronic fatigue syndrome, and fibromyalgia. A definite connection has not been shown, though.  Symptoms    Pain in the face, jaw, or neck    Pain with jaw movement or chewing    Locking or catching sensation of the jaw    Clicking, popping, or grinding sounds with movement of the TMJ    Headache    Ear pain  Home care  Modest, nonsurgical treatments are a good first step toward relieving symptoms. Try the approaches described below.    Rest the jaw by avoiding crunchy or hard-to-chew foods. Do not eat hard or sticky candies. Soft foods and liquids are easier on the jaw.    Protect your jaw while yawning. If you need to yawn, put your fist under your chin to prevent your mouth from opening up too wide.    To help relieve pain, try applying hot or cold packs to the painful area. Try both hot and cold to find out which works best for you. If you use hot packs (small towels soaked in hot water), be careful not to burn yourself.    You may take acetaminophen or ibuprofen for pain, unless you were given a different pain medicine. (Note: If you have chronic liver or kidney disease or have ever had a stomach ulcer or gastrointestinal bleeding, talk with your healthcare provider before using these medicines. Also talk to your provider if you are taking medicine to prevent blood clots.) Aspirin should never be given to anyone younger than 18 years of age who is ill with a viral infection or fever. It may cause severe liver or brain damage.  Reducing stress  If stress seems to be contributing to your symptoms, try to identify the sources of stress in your life. These aren t always obvious. Common stressors  include:    Everyday hassles (which can add up), such as traffic jams, missed appointments, or car trouble    Major life changes, both good (such as a new baby or job promotion) and bad (loss of job or loss of a loved one)    Overload: The feeling that you have too many responsibilities and can't take care of everything at once    Helplessness: Feeling like your problems are more than you can solve  When possible, do something about your sources of stress. See if you can avoid hassles, limit the amount of change in your life at one time, and take breaks when you feel overloaded.  Unfortunately, many stressful situations cannot be avoided. So learning how to manage stress better is very important. Getting regular exercise, eating nutritious, balanced meals, and getting adequate rest all help to make everyday stress more manageable. Certain techniques are also helpful: relaxation and breathing exercises, visualization, biofeedback, meditation, or simply taking some time out to clear your mind. For more information, talk with your healthcare provider.  Follow-up care  Follow up with your healthcare provider, or as advised. Further testing and additional treatment may be required. If changes to your lifestyle do not improve your symptoms, talk with your healthcare provider about other available therapies. These include bite guards for help with teeth grinding, stress management techniques, and more. If stress is an important factor and does not respond to the above simple measures, talk to your doctor about a referral for stress management.    If X-rays were done, they will be reviewed by a specialist. You will be notified of the results, especially if they affect treatment.  Call 911  Call emergency services right away if any of these occur:    Trouble breathing or swallowing, wheezing    Confusion    Extreme drowsiness or trouble awakening    Fainting or loss of consciousness    Rapid heart rate  When to seek medical  advice  Call your healthcare provider right away if any of these occur:    Your face becomes swollen or red.    Your pain worsens.    You have increasing neck, mouth, tooth, or throat pain.    You develop a fever of 100.4F (38 C) or higher  Date Last Reviewed: 7/30/2015 2000-2017 [employer]. [address] [place] [id number]. All rights reserved. This information is not intended as a substitute for professional medical care. Always follow your healthcare professional's instructions.        Self-Care for Temporomandibular Disorders (TMD)  You have temporomandibular disorder (TMD). This term describes a group of problems related to the temporomandibular joint (TMJ) and nearby muscles. The TMJ is located where the upper and lower jaws meet. Treatment will get your jaw back to normal function. But your care doesn t end there. Once you ve had TMD, it s important to avoid reinjury. Get in the habit of doing self-checks. This can make you aware of any symptoms that begin to return, so you can take action right away.    Doing self-checks  Make it a habit to assess your body a few times each day. Try writing yourself a reminder. Or set an alarm on your watch or computer. When doing a self-check, ask yourself:    Do I feel stressed?    Are my muscles tense?    Am I grinding or clenching my teeth?    Is my posture healthy for my body?    Is there anything I can do to make myself more comfortable?  If you answer  yes  to any of the questions above, you need to take action. Adjusting your posture or taking a short break can help prevent or relieve TMD symptoms.  Listening to your body  Many people get used to ignoring pain. But pain is a signal that your body needs care. To maintain your TMJ health:    Avoid hard or chewy foods. Even if you feel fine, eating such foods can trigger symptoms again.    Be aware of your body. Don t ignore TMD symptoms. The nagging pain in your neck or jaw may be a sign that  you need care.    Be sure to keep follow-up appointments with your health care team.  Managing stress  Stress is a key factor in TMD. Stress can cause you to clench your muscles or grind your teeth. It can also affect your sleep, reducing your body s ability to heal. Here are a few tips to manage stress:    Learn ways to relax. Try listening to music or gently stretching. Take a few slow deep breaths. Or, close your eyes and imagine a place or object that is calming.    Get plenty of rest and sleep.    Set goals you know you can attain.    Make time for people and things you enjoy.    Ask for help if you need it. Friends and family can run errands and cook meals for you.   Staying active  Activity helps the body in many ways. You stay looser and more relaxed. It also helps keep muscles and tissues conditioned. That way you can heal faster and make reinjury less likely. Here are some tips to get you started:    Talk to your health care provider before starting an exercise program.    Always warm up and stretch before each activity. This helps prevent injury.    Try walking or swimming. These activities are easy on your joints. They also benefit your heart and lungs.    Try yoga or valeriano chi. These are relaxing activities known for reducing stress.  Date Last Reviewed: 7/13/2015 2000-2017 [employer]. [address] [place] [address], [place] [address]. All rights reserved. This information is not intended as a substitute for professional medical care. Always follow your healthcare professional's instructions.

## 2018-02-05 NOTE — PROGRESS NOTES
SUBJECTIVE:   Santo Toure is a 37 year old male who presents to clinic today for the following health issues:    Concern - Jaw pain  Onset: 2 weeks ago    Description:   Right side    Intensity: 8/10    Progression of Symptoms:  worsening and constant    Accompanying Signs & Symptoms:  Started with some clicking.  Jaw does not fully close on right side but left side does    Previous history of similar problem:   none    Precipitating factors:   Worsened by: no known    Alleviating factors:  Improved by: none    Therapies Tried and outcome: ibuprofen did not help      Problem list and histories reviewed & adjusted, as indicated.  Additional history: as documented    Patient Active Problem List   Diagnosis     Kidney stone     Pyelonephritis     Complicated UTI (urinary tract infection)     Past Surgical History:   Procedure Laterality Date     CYSTOSCOPY, RETROGRADES, INSERT STENT URETER(S), COMBINED Right 9/14/2017    Procedure: COMBINED CYSTOSCOPY, RETROGRADES, INSERT STENT URETER(S);  Cysto , right stent placement;  Surgeon: Pavel Lundy MD;  Location: [facility]     LASER HOLMIUM LITHOTRIPSY URETER(S), INSERT STENT, COMBINED Right 10/5/2017    Procedure: COMBINED CYSTOSCOPY, URETEROSCOPY, LASER HOLMIUM LITHOTRIPSY URETER(S), INSERT STENT;  Cystoscopy, right ureteroscopy, laser lithotripsy, stent exchange;  Surgeon: Pavel Lundy MD;  Location: [facility]       Social History   Substance Use Topics     Smoking status: Never Smoker     Smokeless tobacco: Never Used     Alcohol use Yes      Comment: on occasion     Family History   Problem Relation Age of Onset     HEART DISEASE Mother      MI, also frequent kidney infections         Current Outpatient Prescriptions   Medication Sig Dispense Refill     cyclobenzaprine (FLEXERIL) 10 MG tablet Take 0.5-1 tablets (5-10 mg) by mouth 3 times daily as needed for muscle spasms 30 tablet 1     acetaminophen (TYLENOL) 500 MG tablet Take 2 tablets  (1,000 mg) by mouth every 6 hours as needed for mild pain or fever 1 Bottle 0     promethazine (PHENERGAN) 25 MG tablet Take 1 tablet (25 mg) by mouth every 6 hours as needed for nausea (Patient not taking: Reported on 2/5/2018) 10 tablet 0     oxyCODONE (ROXICODONE) 5 MG IR tablet Take 1 tablet (5 mg) by mouth every 6 hours as needed for pain maximum 4 tablet(s) per day (Patient not taking: Reported on 2/5/2018) 10 tablet 0     ketorolac (TORADOL) 10 MG tablet Take 1 tablet (10 mg) by mouth every 6 hours as needed for moderate pain (Patient not taking: Reported on 2/5/2018) 20 tablet 0     Allergies   Allergen Reactions     Percocet [Oxycodone-Acetaminophen] Nausea and Vomiting     Penicillins Other (See Comments) and Rash     Vomiting as a infant       Reviewed and updated as needed this visit by clinical staff  Tobacco  Allergies  Meds  Med Hx  Surg Hx  Fam Hx  Soc Hx      Reviewed and updated as needed this visit by Provider         ROS:  Constitutional, HEENT, cardiovascular, pulmonary, gi and gu systems are negative, except as otherwise noted.    OBJECTIVE:     BP (!) 148/100  Pulse 94  Temp 98.4  F (36.9  C) (Tympanic)  Ht 6' (1.829 m)  Wt 242 lb (109.8 kg)  BMI 32.82 kg/m2  Body mass index is 32.82 kg/(m^2).   GENERAL: healthy, alert and no distress  EYES: Eyes grossly normal to inspection, PERRL and conjunctivae and sclerae normal  HENT: ear canals and TM's normal, nose and mouth without ulcers or lesions  NECK: no adenopathy, no asymmetry, masses, or scars and thyroid normal to palpation Tenderness to right jaw   RESP: lungs clear to auscultation - no rales, rhonchi or wheezes  CV: regular rate and rhythm, normal S1 S2, no S3 or S4, no murmur, click or rub, no peripheral edema and peripheral pulses strong  MS: no gross musculoskeletal defects noted, no edema  SKIN: no suspicious lesions or rashes  NEURO: Normal strength and tone, mentation intact and speech normal  PSYCH: mentation appears  normal, affect normal/bright        ASSESSMENT:       ICD-10-CM    1. Temporomandibular joint disorder M26.609 cyclobenzaprine (FLEXERIL) 10 MG tablet         PLAN:     Patient Instructions     We will treat with a course of muscle relaxers Flexeril 5-10 mg 3 times a day as needed.  Continue to monitor if not improving contact me and we will put in a referral for ENT TMJ clinic.    Follow-up with your primary care provider next week and as needed.    Indications for emergent return to emergency department discussed with patient, who verbalized good understanding and agreement.  Patient understands the limitations of today's evaluation.         When You Have Temporomandibular Disorder (TMD)  The temporomandibular joint (TMJ) is a ball-and-socket joint located where the upper and lower jaws meet. The TMJ and its nearby muscles make up a complex, loosely connected system. Because of this, a problem in one part of the system can affect the other parts. This can cause you to have temporomandibular disorder (TMD).         How the temporomandibular joint works  You have 1 joint on each side of your mouth that together make up the TMJ. These joints are part of a large group of muscles, ligaments, and bones that work together as a system. When the system is healthy, you can talk, chew, and even yawn in comfort. Muscles contract and relax to open and close the joint. The disk is made of cartilage and is located between the condyle and the skull. It absorbs pressure in the joint and allows the jaws to open and close smoothly. Fluid (called synovial fluid) lubricates the joints. Ligaments connect the lower jaw bones to the skull. They also support the joint.  Common temporomandibular problems  When there is a problem with the TMJ and its related system, you can develop TMD. Common TMD problems include tight muscles, inflamed joints, and damaged joints.  In some cases, symptoms may be related to the teeth or bite.    Tight  muscles. The muscles surrounding the TMJ can go into spasm (tighten) and cause pain. Referred pain happens in a part of the body separate from the source of the problem. For example, pain in the face or teeth could be coming from a problem in the TMJ. Myofascial pain happens in soft tissues, like muscle. Trigger points in these pain areas often cause referred pain. You may feel jaw, neck, or shoulder pain.    Inflamed joints. Inflammation may include pain, redness, heat, swelling, or loss of function. Synovitis happens when certain tissues surrounding the TMJ become inflamed. It causes pain that increases with jaw movement. Inflamed ligamentscan be caused by strain or injury. When this happens, the ligaments are unable to support the joint. Rheumatoid arthritis is a joint disease. It leads to inflammation in the TMJ.    Damaged joints. Many people hear clicking when their jaw moves. If you feel pain along with the noise, the joint may be damaged. Impingement happens when the disk slips out of place (displacement). This causes the jaw to catch. As the disk slips, you may hear a clicking sound. Locked jaw happens when the disk gets stuck in one position. As a result, the jaw locks open or closed. Osteoarthritis is a joint disease. It causes the TMJ to wear away (degenerate). This leads to pain during movement.     Other problems  The parts of the jaw and mouth make up a single unit. That s why a problem in 1 area can cause symptoms elsewhere. Teeth or bite problems associated with TMD include:    Bruxism (grinding your teeth side to side)    Clenching (biting down on your teeth)    Malocclusion (when the teeth or bite is out of alignment)  Your health care provider will give you more information about these problems if needed.   Date Last Reviewed: 7/13/2015 2000-2017 [employer]. [address] [place] [address], [place] [address]. All rights reserved. This information is not intended as a substitute for  professional medical care. Always follow your healthcare professional's instructions.        TMJ Syndrome  The temporomandibular joint (TMJ) is the joint that connects your lower jaw to your head. You can feel it in front of your ears when you open and close your mouth. TMJ disorders involve chronic or recurrent pain in the joint. When treated, symptoms of TMJ disorders usually go away within a few months.  Causes  There is no widely agreed-on cause of TMJ disorders. They have been linked to injury, arthritis, chronic fatigue syndrome, and fibromyalgia. A definite connection has not been shown, though.  Symptoms    Pain in the face, jaw, or neck    Pain with jaw movement or chewing    Locking or catching sensation of the jaw    Clicking, popping, or grinding sounds with movement of the TMJ    Headache    Ear pain  Home care  Modest, nonsurgical treatments are a good first step toward relieving symptoms. Try the approaches described below.    Rest the jaw by avoiding crunchy or hard-to-chew foods. Do not eat hard or sticky candies. Soft foods and liquids are easier on the jaw.    Protect your jaw while yawning. If you need to yawn, put your fist under your chin to prevent your mouth from opening up too wide.    To help relieve pain, try applying hot or cold packs to the painful area. Try both hot and cold to find out which works best for you. If you use hot packs (small towels soaked in hot water), be careful not to burn yourself.    You may take acetaminophen or ibuprofen for pain, unless you were given a different pain medicine. (Note: If you have chronic liver or kidney disease or have ever had a stomach ulcer or gastrointestinal bleeding, talk with your healthcare provider before using these medicines. Also talk to your provider if you are taking medicine to prevent blood clots.) Aspirin should never be given to anyone younger than 18 years of age who is ill with a viral infection or fever. It may cause severe  liver or brain damage.  Reducing stress  If stress seems to be contributing to your symptoms, try to identify the sources of stress in your life. These aren t always obvious. Common stressors include:    Everyday hassles (which can add up), such as traffic jams, missed appointments, or car trouble    Major life changes, both good (such as a new baby or job promotion) and bad (loss of job or loss of a loved one)    Overload: The feeling that you have too many responsibilities and can't take care of everything at once    Helplessness: Feeling like your problems are more than you can solve  When possible, do something about your sources of stress. See if you can avoid hassles, limit the amount of change in your life at one time, and take breaks when you feel overloaded.  Unfortunately, many stressful situations cannot be avoided. So learning how to manage stress better is very important. Getting regular exercise, eating nutritious, balanced meals, and getting adequate rest all help to make everyday stress more manageable. Certain techniques are also helpful: relaxation and breathing exercises, visualization, biofeedback, meditation, or simply taking some time out to clear your mind. For more information, talk with your healthcare provider.  Follow-up care  Follow up with your healthcare provider, or as advised. Further testing and additional treatment may be required. If changes to your lifestyle do not improve your symptoms, talk with your healthcare provider about other available therapies. These include bite guards for help with teeth grinding, stress management techniques, and more. If stress is an important factor and does not respond to the above simple measures, talk to your doctor about a referral for stress management.    If X-rays were done, they will be reviewed by a specialist. You will be notified of the results, especially if they affect treatment.  Call 911  Call emergency services right away if any of  these occur:    Trouble breathing or swallowing, wheezing    Confusion    Extreme drowsiness or trouble awakening    Fainting or loss of consciousness    Rapid heart rate  When to seek medical advice  Call your healthcare provider right away if any of these occur:    Your face becomes swollen or red.    Your pain worsens.    You have increasing neck, mouth, tooth, or throat pain.    You develop a fever of 100.4F (38 C) or higher  Date Last Reviewed: 7/30/2015 2000-2017 [employer]. [address] [place] [address]. All rights reserved. This information is not intended as a substitute for professional medical care. Always follow your healthcare professional's instructions.        Self-Care for Temporomandibular Disorders (TMD)  You have temporomandibular disorder (TMD). This term describes a group of problems related to the temporomandibular joint (TMJ) and nearby muscles. The TMJ is located where the upper and lower jaws meet. Treatment will get your jaw back to normal function. But your care doesn t end there. Once you ve had TMD, it s important to avoid reinjury. Get in the habit of doing self-checks. This can make you aware of any symptoms that begin to return, so you can take action right away.    Doing self-checks  Make it a habit to assess your body a few times each day. Try writing yourself a reminder. Or set an alarm on your watch or computer. When doing a self-check, ask yourself:    Do I feel stressed?    Are my muscles tense?    Am I grinding or clenching my teeth?    Is my posture healthy for my body?    Is there anything I can do to make myself more comfortable?  If you answer  yes  to any of the questions above, you need to take action. Adjusting your posture or taking a short break can help prevent or relieve TMD symptoms.  Listening to your body  Many people get used to ignoring pain. But pain is a signal that your body needs care. To maintain your TMJ health:    Avoid hard  or chewy foods. Even if you feel fine, eating such foods can trigger symptoms again.    Be aware of your body. Don t ignore TMD symptoms. The nagging pain in your neck or jaw may be a sign that you need care.    Be sure to keep follow-up appointments with your health care team.  Managing stress  Stress is a key factor in TMD. Stress can cause you to clench your muscles or grind your teeth. It can also affect your sleep, reducing your body s ability to heal. Here are a few tips to manage stress:    Learn ways to relax. Try listening to music or gently stretching. Take a few slow deep breaths. Or, close your eyes and imagine a place or object that is calming.    Get plenty of rest and sleep.    Set goals you know you can attain.    Make time for people and things you enjoy.    Ask for help if you need it. Friends and family can run errands and cook meals for you.   Staying active  Activity helps the body in many ways. You stay looser and more relaxed. It also helps keep muscles and tissues conditioned. That way you can heal faster and make reinjury less likely. Here are some tips to get you started:    Talk to your health care provider before starting an exercise program.    Always warm up and stretch before each activity. This helps prevent injury.    Try walking or swimming. These activities are easy on your joints. They also benefit your heart and lungs.    Try yoga or valeriano chi. These are relaxing activities known for reducing stress.  Date Last Reviewed: 7/13/2015 2000-2017 [employer]. [address] [place] [address], [place] [id number]. All rights reserved. This information is not intended as a substitute for professional medical care. Always follow your healthcare professional's instructions.              ROSSY Wynne [facility]\

## 2018-02-06 ENCOUNTER — MYC MEDICAL ADVICE (OUTPATIENT)
Dept: FAMILY MEDICINE | Facility: CLINIC | Age: 38
End: 2018-02-06

## 2018-02-07 ENCOUNTER — OFFICE VISIT (OUTPATIENT)
Dept: FAMILY MEDICINE | Facility: CLINIC | Age: 38
End: 2018-02-07
Payer: COMMERCIAL

## 2018-02-07 ENCOUNTER — MYC MEDICAL ADVICE (OUTPATIENT)
Dept: FAMILY MEDICINE | Facility: CLINIC | Age: 38
End: 2018-02-07

## 2018-02-07 VITALS
BODY MASS INDEX: 33.46 KG/M2 | TEMPERATURE: 98.6 F | SYSTOLIC BLOOD PRESSURE: 130 MMHG | HEIGHT: 72 IN | DIASTOLIC BLOOD PRESSURE: 78 MMHG | HEART RATE: 76 BPM | WEIGHT: 247 LBS

## 2018-02-07 DIAGNOSIS — M26.609 TEMPOROMANDIBULAR JOINT DISORDER: Primary | ICD-10-CM

## 2018-02-07 DIAGNOSIS — H61.21 IMPACTED CERUMEN OF RIGHT EAR: ICD-10-CM

## 2018-02-07 PROCEDURE — 99213 OFFICE O/P EST LOW 20 MIN: CPT | Performed by: NURSE PRACTITIONER

## 2018-02-07 RX ORDER — KETOROLAC TROMETHAMINE 10 MG/1
10 TABLET, FILM COATED ORAL EVERY 6 HOURS PRN
Qty: 20 TABLET | Refills: 0 | Status: SHIPPED | OUTPATIENT
Start: 2018-02-07 | End: 2020-03-10

## 2018-02-07 NOTE — MR AVS SNAPSHOT
After Visit Summary   2/7/2018    Santo Toure    MRN: [id number]           Patient Information     Date Of Birth          1980        Visit Information        Provider Department      2/7/2018 1:40 PM Ina May APRN CNP; [facility]        Today's Diagnoses     Temporomandibular joint disorder    -  1      Care Instructions    Your provider has referred you to: [facility]: [place] [facility] ([facility] Only) - [phone]         TMJ Syndrome  The temporomandibular joint (TMJ) is the joint that connects your lower jaw to your head. You can feel it in front of your ears when you open and close your mouth. TMJ disorders involve chronic or recurrent pain in the joint. When treated, symptoms of TMJ disorders usually go away within a few months.  Causes  There is no widely agreed-on cause of TMJ disorders. They have been linked to injury, arthritis, chronic fatigue syndrome, and fibromyalgia. A definite connection has not been shown, though.  Symptoms    Pain in the face, jaw, or neck    Pain with jaw movement or chewing    Locking or catching sensation of the jaw    Clicking, popping, or grinding sounds with movement of the TMJ    Headache    Ear pain  Home care  Modest, nonsurgical treatments are a good first step toward relieving symptoms. Try the approaches described below.    Rest the jaw by avoiding crunchy or hard-to-chew foods. Do not eat hard or sticky candies. Soft foods and liquids are easier on the jaw.    Protect your jaw while yawning. If you need to yawn, put your fist under your chin to prevent your mouth from opening up too wide.    To help relieve pain, try applying hot or cold packs to the painful area. Try both hot and cold to find out which works best for you. If you use hot packs (small towels soaked in hot water), be careful not to burn yourself.    You may take acetaminophen or ibuprofen for pain, unless you were given a different pain  medicine. (Note: If you have chronic liver or kidney disease or have ever had a stomach ulcer or gastrointestinal bleeding, talk with your healthcare provider before using these medicines. Also talk to your provider if you are taking medicine to prevent blood clots.) Aspirin should never be given to anyone younger than 18 years of age who is ill with a viral infection or fever. It may cause severe liver or brain damage.  Reducing stress  If stress seems to be contributing to your symptoms, try to identify the sources of stress in your life. These aren t always obvious. Common stressors include:    Everyday hassles (which can add up), such as traffic jams, missed appointments, or car trouble    Major life changes, both good (such as a new baby or job promotion) and bad (loss of job or loss of a loved one)    Overload: The feeling that you have too many responsibilities and can't take care of everything at once    Helplessness: Feeling like your problems are more than you can solve  When possible, do something about your sources of stress. See if you can avoid hassles, limit the amount of change in your life at one time, and take breaks when you feel overloaded.  Unfortunately, many stressful situations cannot be avoided. So learning how to manage stress better is very important. Getting regular exercise, eating nutritious, balanced meals, and getting adequate rest all help to make everyday stress more manageable. Certain techniques are also helpful: relaxation and breathing exercises, visualization, biofeedback, meditation, or simply taking some time out to clear your mind. For more information, talk with your healthcare provider.  Follow-up care  Follow up with your healthcare provider, or as advised. Further testing and additional treatment may be required. If changes to your lifestyle do not improve your symptoms, talk with your healthcare provider about other available therapies. These include bite guards for  help with teeth grinding, stress management techniques, and more. If stress is an important factor and does not respond to the above simple measures, talk to your doctor about a referral for stress management.    If X-rays were done, they will be reviewed by a specialist. You will be notified of the results, especially if they affect treatment.  Call 911  Call emergency services right away if any of these occur:    Trouble breathing or swallowing, wheezing    Confusion    Extreme drowsiness or trouble awakening    Fainting or loss of consciousness    Rapid heart rate  When to seek medical advice  Call your healthcare provider right away if any of these occur:    Your face becomes swollen or red.    Your pain worsens.    You have increasing neck, mouth, tooth, or throat pain.    You develop a fever of 100.4F (38 C) or higher  Date Last Reviewed: 7/30/2015 2000-2017 The "[employer]. [address] [place] [address]. All rights reserved. This information is not intended as a substitute for professional medical care. Always follow your healthcare professional's instructions.        Dental Treatment for Temporomandibular Disorders (TMD)  You have been diagnosed with temporomandibular disorder (TMD).This term describes a group of problems related to the temporomandibular joint (TMJ) and nearby muscles. The TMJ is located where the upper and lower jaws meet and is part of a structural system that includes the teeth. Because the joint and teeth work together, a problem with your teeth and bite can be linked to TMD. If you grind your teeth or if you have a bad bite, your dentist may be able to help. If your teeth need to be realigned to improve your bite, you may be referred to an orthodontist.  If you grind or clench your teeth    Bruxism (teeth grinding) or clenching strains the TMJ and related muscles. If you have these habits during the day, doing self-checks can help you stop. But it s hard to  control these habits when you re asleep. That s when the use of a splint can often help:    How a splint works. A splint is an appliance that fits in the mouth. It may also be called an orthotic or . There are different kinds of splints for different kinds of needs. A splint can keep the upper and lower teeth apart. This helps protect tooth surfaces from grinding. A splint can also be made to reduce strain on the area.    Wearing and caring for your splint. To make a splint, your dentist or orthodontist may take impressions of your teeth. Then a splint will be made to fit your mouth. A splint:    May be worn during the day or only at night. Be sure to ask when and how often you should wear your splint.    Should be cleaned before you put it in and after you take it out. Ask your dentist or orthodontist how to clean the splint.    Should be kept in a protective case, away from the reach of children and pets. This helps keep the splint from getting dirty or broken.  If your bite is incorrect  Malocclusion means the jaws or teeth don t fit together properly. This can result in pain and problems with jaw function. If your jaws or teeth are out of alignment, orthodontic treatment may help. If your bad bite is due to missing or damaged teeth, you may receive restorative treatment.    Restorative treatment. A bad bite can be caused by missing or damaged teeth. A dentist can restore teeth in many ways:    A crown is made of ceramic, metal, or porcelain and metal. It is cemented in place to repair a broken or damaged tooth.    A bridge is a false tooth fused between 2 crowns.    A dental implant is an artificial tooth root. It is attached to the jawbone as a base for an artificial tooth.    Orthodontic treatment. Sometimes the upper and lower jaws are out of alignment. Or teeth are out of line, turned, crowded, or spaced too far apart. Your orthodontist can align teeth with braces and other devices. This helps  provide a more comfortable bite.  If surgery is needed  Surgery is rarely needed for TMD. However, if other treatments haven t worked, you may be referred to an oral and maxillofacial surgeon. Talk to your dentist about whether surgery might be right for you.   Date Last Reviewed: 7/13/2015 2000-2017 [employer]. [address] [place] [address], [place] [address]. All rights reserved. This information is not intended as a substitute for professional medical care. Always follow your healthcare professional's instructions.                Follow-ups after your visit        Additional Services     OTOLARYNGOLOGY REFERRAL       Your provider has referred you to: [facility]: [facility] ([facility] Only) - [phone]       Please be aware that coverage of these services is subject to the terms and limitations of your health insurance plan.  Call member services at your health plan with any benefit or coverage questions.      Please bring the following with you to your appointment:    (1) Any X-Rays, CTs or MRIs which have been performed.  Contact the facility where they were done to arrange for  prior to your scheduled appointment.   (2) List of current medications  (3) This referral request   (4) Any documents/labs given to you for this referral                  Who to contact     If you have questions or need follow up information about today's clinic visit or your schedule please contact [facility] directly at [phone].  Normal or non-critical lab and imaging results will be communicated to you by MyChart, letter or phone within 4 business days after the clinic has received the results. If you do not hear from us within 7 days, please contact the clinic through MyChart or phone. If you have a critical or abnormal lab result, we will notify you by phone as soon as possible.  Submit refill requests through [employer] or call your pharmacy and they will forward the refill  request to us. Please allow 3 business days for your refill to be completed.          Additional Information About Your Visit        [facility] Information     [employer] gives you secure access to your electronic health record. If you see a primary care provider, you can also send messages to your care team and make appointments. If you have questions, please call your primary care clinic.  If you do not have a primary care provider, please call [phone] and they will assist you.        Care EveryWhere ID     This is your Care EveryWhere ID. This could be used by other organizations to access your [facility] medical records  [id number]        Your Vitals Were     Pulse Temperature Height BMI (Body Mass Index)          76 98.6  F (37  C) (Tympanic) 6' (1.829 m) 33.5 kg/m2         Blood Pressure from Last 3 Encounters:   02/07/18 130/78   02/05/18 (!) 148/100   10/11/17 (!) 142/92    Weight from Last 3 Encounters:   02/07/18 247 lb (112 kg)   02/05/18 242 lb (109.8 kg)   10/07/17 229 lb 15 oz (104.3 kg)              We Performed the Following     OTOLARYNGOLOGY REFERRAL          Today's Medication Changes          These changes are accurate as of 2/7/18  2:18 PM.  If you have any questions, ask your nurse or doctor.               Start taking these medicines.        Dose/Directions    ketorolac 10 MG tablet   Commonly known as:  TORADOL   Used for:  Temporomandibular joint disorder   Started by:  Ina May APRN CNP        Dose:  10 mg   Take 1 tablet (10 mg) by mouth every 6 hours as needed for moderate pain   Quantity:  20 tablet   Refills:  0            Where to get your medicines      These medications were sent to [facility] [address] [address]     Phone:  [phone]     ketorolac 10 MG tablet                Primary Care Provider Office Phone # Fax #    [facility] [phone]       [address] [place] [address]  [facility] [address]        Equal Access to Services     ANTONINA ALBA : Hadii aad ku hadalexandrshannon Rondavalentin, wajudithda luqadaha, qaybta kaalmamookie almazan. So [facility] [phone].    ATENCIÓN: Si habla español, tiene a marshall disposición servicios gratuitos de asistencia lingüística. [facility] [phone].    We comply with applicable federal civil rights laws and [place] laws. We do not discriminate on the basis of race, color, national origin, age, disability, sex, sexual orientation, or gender identity.            Thank you!     Thank you for choosing [facility]  for your care. Our goal is always to provide you with excellent care. Hearing back from our patients is one way we can continue to improve our services. Please take a few minutes to complete the written survey that you may receive in the mail after your visit with us. Thank you!             Your Updated Medication List - Protect others around you: Learn how to safely use, store and throw away your medicines at www.disposemymeds.org.          This list is accurate as of 2/7/18  2:18 PM.  Always use your most recent med list.                   Brand Name Dispense Instructions for use Diagnosis    acetaminophen 500 MG tablet    TYLENOL    1 Bottle    Take 2 tablets (1,000 mg) by mouth every 6 hours as needed for mild pain or fever    Calculi, ureter       cyclobenzaprine 10 MG tablet    FLEXERIL    30 tablet    Take 0.5-1 tablets (5-10 mg) by mouth 3 times daily as needed for muscle spasms    Temporomandibular joint disorder       ketorolac 10 MG tablet    TORADOL    20 tablet    Take 1 tablet (10 mg) by mouth every 6 hours as needed for moderate pain    Temporomandibular joint disorder

## 2018-02-07 NOTE — PROGRESS NOTES
SUBJECTIVE:   Santo Toure is a 37 year old male who presents to clinic today for the following health issues:    Patient is here to discuss and complete short term disability forms. The cyclobenzaprine that he takes for his TMJ makes him feel dizzy and drowsy and does not allow him to work safely. He would also like to discuss different medication options.      Problem list and histories reviewed & adjusted, as indicated.  Additional history: as documented    Patient Active Problem List   Diagnosis     Kidney stone     Pyelonephritis     Complicated UTI (urinary tract infection)     Past Surgical History:   Procedure Laterality Date     CYSTOSCOPY, RETROGRADES, INSERT STENT URETER(S), COMBINED Right 9/14/2017    Procedure: COMBINED CYSTOSCOPY, RETROGRADES, INSERT STENT URETER(S);  Cysto , right stent placement;  Surgeon: Pavel Lundy MD;  Location: [facility]     LASER HOLMIUM LITHOTRIPSY URETER(S), INSERT STENT, COMBINED Right 10/5/2017    Procedure: COMBINED CYSTOSCOPY, URETEROSCOPY, LASER HOLMIUM LITHOTRIPSY URETER(S), INSERT STENT;  Cystoscopy, right ureteroscopy, laser lithotripsy, stent exchange;  Surgeon: Pavel Lundy MD;  Location: [facility]       Social History   Substance Use Topics     Smoking status: Never Smoker     Smokeless tobacco: Never Used     Alcohol use Yes      Comment: on occasion     Family History   Problem Relation Age of Onset     HEART DISEASE Mother      MI, also frequent kidney infections         Current Outpatient Prescriptions   Medication Sig Dispense Refill     ketorolac (TORADOL) 10 MG tablet Take 1 tablet (10 mg) by mouth every 6 hours as needed for moderate pain 20 tablet 0     cyclobenzaprine (FLEXERIL) 10 MG tablet Take 0.5-1 tablets (5-10 mg) by mouth 3 times daily as needed for muscle spasms 30 tablet 1     acetaminophen (TYLENOL) 500 MG tablet Take 2 tablets (1,000 mg) by mouth every 6 hours as needed for mild pain or fever 1 Bottle 0     Allergies    Allergen Reactions     Percocet [Oxycodone-Acetaminophen] Nausea and Vomiting     Penicillins Other (See Comments) and Rash     Vomiting as a infant       Reviewed and updated as needed this visit by clinical staff       Reviewed and updated as needed this visit by Provider         ROS:  Constitutional, HEENT, cardiovascular, pulmonary, gi and gu systems are negative, except as otherwise noted.    OBJECTIVE:     /78 (BP Location: Right arm, Cuff Size: Adult Large)  Pulse 76  Temp 98.6  F (37  C) (Tympanic)  Ht 6' (1.829 m)  Wt 247 lb (112 kg)  BMI 33.5 kg/m2  Body mass index is 33.5 kg/(m^2).   GENERAL: healthy, alert and no distress  EYES: Eyes grossly normal to inspection, PERRL and conjunctivae and sclerae normal  HENT: ear canals and TM's normal, nose and mouth without ulcers or lesions  HENT: right ear: occluded with wax  NECK: no adenopathy, no asymmetry, masses, or scars and thyroid normal to palpation  RESP: lungs clear to auscultation - no rales, rhonchi or wheezes  CV: regular rate and rhythm, normal S1 S2, no S3 or S4, no murmur, click or rub, no peripheral edema and peripheral pulses strong  ABDOMEN: soft, nontender, no hepatosplenomegaly, no masses and bowel sounds normal  MS: no gross musculoskeletal defects noted, no edema  SKIN: no suspicious lesions or rashes  NEURO: Normal strength and tone, mentation intact and speech normal  PSYCH: mentation appears normal, affect normal/bright    Right ear was irrigated out wax was removed to TM was normal no signs of infection.  Patient continues to have jaw pain and signs of TMJ Flexeril was not effective.  Will start on Toradol and referred to TMJ clinic.  Earwax was removed may be a contributing factor in the jaw pain will see if it improves after Toradol and the removal of the wax.    ASSESSMENT:       ICD-10-CM    1. Temporomandibular joint disorder M26.609 ketorolac (TORADOL) 10 MG tablet     OTOLARYNGOLOGY REFERRAL   2. Impacted cerumen of  right ear H61.21          PLAN:     Patient Instructions     Your provider has referred you to: [facility]: [facility] ([facility] Only) - [phone]         TMJ Syndrome  The temporomandibular joint (TMJ) is the joint that connects your lower jaw to your head. You can feel it in front of your ears when you open and close your mouth. TMJ disorders involve chronic or recurrent pain in the joint. When treated, symptoms of TMJ disorders usually go away within a few months.  Causes  There is no widely agreed-on cause of TMJ disorders. They have been linked to injury, arthritis, chronic fatigue syndrome, and fibromyalgia. A definite connection has not been shown, though.  Symptoms    Pain in the face, jaw, or neck    Pain with jaw movement or chewing    Locking or catching sensation of the jaw    Clicking, popping, or grinding sounds with movement of the TMJ    Headache    Ear pain  Home care  Modest, nonsurgical treatments are a good first step toward relieving symptoms. Try the approaches described below.    Rest the jaw by avoiding crunchy or hard-to-chew foods. Do not eat hard or sticky candies. Soft foods and liquids are easier on the jaw.    Protect your jaw while yawning. If you need to yawn, put your fist under your chin to prevent your mouth from opening up too wide.    To help relieve pain, try applying hot or cold packs to the painful area. Try both hot and cold to find out which works best for you. If you use hot packs (small towels soaked in hot water), be careful not to burn yourself.    You may take acetaminophen or ibuprofen for pain, unless you were given a different pain medicine. (Note: If you have chronic liver or kidney disease or have ever had a stomach ulcer or gastrointestinal bleeding, talk with your healthcare provider before using these medicines. Also talk to your provider if you are taking medicine to prevent blood clots.) Aspirin should never be given to anyone younger than 18  years of age who is ill with a viral infection or fever. It may cause severe liver or brain damage.  Reducing stress  If stress seems to be contributing to your symptoms, try to identify the sources of stress in your life. These aren t always obvious. Common stressors include:    Everyday hassles (which can add up), such as traffic jams, missed appointments, or car trouble    Major life changes, both good (such as a new baby or job promotion) and bad (loss of job or loss of a loved one)    Overload: The feeling that you have too many responsibilities and can't take care of everything at once    Helplessness: Feeling like your problems are more than you can solve  When possible, do something about your sources of stress. See if you can avoid hassles, limit the amount of change in your life at one time, and take breaks when you feel overloaded.  Unfortunately, many stressful situations cannot be avoided. So learning how to manage stress better is very important. Getting regular exercise, eating nutritious, balanced meals, and getting adequate rest all help to make everyday stress more manageable. Certain techniques are also helpful: relaxation and breathing exercises, visualization, biofeedback, meditation, or simply taking some time out to clear your mind. For more information, talk with your healthcare provider.  Follow-up care  Follow up with your healthcare provider, or as advised. Further testing and additional treatment may be required. If changes to your lifestyle do not improve your symptoms, talk with your healthcare provider about other available therapies. These include bite guards for help with teeth grinding, stress management techniques, and more. If stress is an important factor and does not respond to the above simple measures, talk to your doctor about a referral for stress management.    If X-rays were done, they will be reviewed by a specialist. You will be notified of the results, especially if  they affect treatment.  Call 911  Call emergency services right away if any of these occur:    Trouble breathing or swallowing, wheezing    Confusion    Extreme drowsiness or trouble awakening    Fainting or loss of consciousness    Rapid heart rate  When to seek medical advice  Call your healthcare provider right away if any of these occur:    Your face becomes swollen or red.    Your pain worsens.    You have increasing neck, mouth, tooth, or throat pain.    You develop a fever of 100.4F (38 C) or higher  Date Last Reviewed: 7/30/2015 2000-2017 [employer]. [address] [place] [address]. All rights reserved. This information is not intended as a substitute for professional medical care. Always follow your healthcare professional's instructions.        Dental Treatment for Temporomandibular Disorders (TMD)  You have been diagnosed with temporomandibular disorder (TMD).This term describes a group of problems related to the temporomandibular joint (TMJ) and nearby muscles. The TMJ is located where the upper and lower jaws meet and is part of a structural system that includes the teeth. Because the joint and teeth work together, a problem with your teeth and bite can be linked to TMD. If you grind your teeth or if you have a bad bite, your dentist may be able to help. If your teeth need to be realigned to improve your bite, you may be referred to an orthodontist.  If you grind or clench your teeth    Bruxism (teeth grinding) or clenching strains the TMJ and related muscles. If you have these habits during the day, doing self-checks can help you stop. But it s hard to control these habits when you re asleep. That s when the use of a splint can often help:    How a splint works. A splint is an appliance that fits in the mouth. It may also be called an orthotic or . There are different kinds of splints for different kinds of needs. A splint can keep the upper and lower teeth apart.  This helps protect tooth surfaces from grinding. A splint can also be made to reduce strain on the area.    Wearing and caring for your splint. To make a splint, your dentist or orthodontist may take impressions of your teeth. Then a splint will be made to fit your mouth. A splint:    May be worn during the day or only at night. Be sure to ask when and how often you should wear your splint.    Should be cleaned before you put it in and after you take it out. Ask your dentist or orthodontist how to clean the splint.    Should be kept in a protective case, away from the reach of children and pets. This helps keep the splint from getting dirty or broken.  If your bite is incorrect  Malocclusion means the jaws or teeth don t fit together properly. This can result in pain and problems with jaw function. If your jaws or teeth are out of alignment, orthodontic treatment may help. If your bad bite is due to missing or damaged teeth, you may receive restorative treatment.    Restorative treatment. A bad bite can be caused by missing or damaged teeth. A dentist can restore teeth in many ways:    A crown is made of ceramic, metal, or porcelain and metal. It is cemented in place to repair a broken or damaged tooth.    A bridge is a false tooth fused between 2 crowns.    A dental implant is an artificial tooth root. It is attached to the jawbone as a base for an artificial tooth.    Orthodontic treatment. Sometimes the upper and lower jaws are out of alignment. Or teeth are out of line, turned, crowded, or spaced too far apart. Your orthodontist can align teeth with braces and other devices. This helps provide a more comfortable bite.  If surgery is needed  Surgery is rarely needed for TMD. However, if other treatments haven t worked, you may be referred to an oral and maxillofacial surgeon. Talk to your dentist about whether surgery might be right for you.   Date Last Reviewed: 7/13/2015 2000-2017 [employer]  [address] [place] [address]. All rights reserved. This information is not intended as a substitute for professional medical care. Always follow your healthcare professional's instructions.            ROSSY Wynne [facility]

## 2018-02-07 NOTE — NURSING NOTE
Chief Complaint   Patient presents with     Disability     Forms       Initial /78 (BP Location: Right arm, Cuff Size: Adult Large)  Pulse 76  Temp 98.6  F (37  C) (Tympanic)  Ht 6' (1.829 m)  Wt 247 lb (112 kg)  BMI 33.5 kg/m2 Estimated body mass index is 33.5 kg/(m^2) as calculated from the following:    Height as of this encounter: 6' (1.829 m).    Weight as of this encounter: 247 lb (112 kg).      Health Maintenance that is potentially due pending provider review:  NONE    Is there anyone who you would like to be able to receive your results? No  If yes have patient fill out HENRRY

## 2018-02-07 NOTE — LETTER
[facility] [address] [facility]  [address] [place] [id number]  Phone: [phone]  Fax: [phone]    February 7, 2018        Santo Toure  [id number] [facility] [id number]          To whom it may concern:    RE: Santo Toure    Patient was seen and treated today at our clinic.  Patient may return to work 2/12/2018 with the following:  No working or lifting restrictions    Please contact me for questions or concerns.      Sincerely,        ROSSY Wynne CNP

## 2018-02-07 NOTE — PATIENT INSTRUCTIONS
Your provider has referred you to: N: [facility] ([facility] Only) - [phone]         TMJ Syndrome  The temporomandibular joint (TMJ) is the joint that connects your lower jaw to your head. You can feel it in front of your ears when you open and close your mouth. TMJ disorders involve chronic or recurrent pain in the joint. When treated, symptoms of TMJ disorders usually go away within a few months.  Causes  There is no widely agreed-on cause of TMJ disorders. They have been linked to injury, arthritis, chronic fatigue syndrome, and fibromyalgia. A definite connection has not been shown, though.  Symptoms    Pain in the face, jaw, or neck    Pain with jaw movement or chewing    Locking or catching sensation of the jaw    Clicking, popping, or grinding sounds with movement of the TMJ    Headache    Ear pain  Home care  Modest, nonsurgical treatments are a good first step toward relieving symptoms. Try the approaches described below.    Rest the jaw by avoiding crunchy or hard-to-chew foods. Do not eat hard or sticky candies. Soft foods and liquids are easier on the jaw.    Protect your jaw while yawning. If you need to yawn, put your fist under your chin to prevent your mouth from opening up too wide.    To help relieve pain, try applying hot or cold packs to the painful area. Try both hot and cold to find out which works best for you. If you use hot packs (small towels soaked in hot water), be careful not to burn yourself.    You may take acetaminophen or ibuprofen for pain, unless you were given a different pain medicine. (Note: If you have chronic liver or kidney disease or have ever had a stomach ulcer or gastrointestinal bleeding, talk with your healthcare provider before using these medicines. Also talk to your provider if you are taking medicine to prevent blood clots.) Aspirin should never be given to anyone younger than 18 years of age who is ill with a viral infection or fever. It may  cause severe liver or brain damage.  Reducing stress  If stress seems to be contributing to your symptoms, try to identify the sources of stress in your life. These aren t always obvious. Common stressors include:    Everyday hassles (which can add up), such as traffic jams, missed appointments, or car trouble    Major life changes, both good (such as a new baby or job promotion) and bad (loss of job or loss of a loved one)    Overload: The feeling that you have too many responsibilities and can't take care of everything at once    Helplessness: Feeling like your problems are more than you can solve  When possible, do something about your sources of stress. See if you can avoid hassles, limit the amount of change in your life at one time, and take breaks when you feel overloaded.  Unfortunately, many stressful situations cannot be avoided. So learning how to manage stress better is very important. Getting regular exercise, eating nutritious, balanced meals, and getting adequate rest all help to make everyday stress more manageable. Certain techniques are also helpful: relaxation and breathing exercises, visualization, biofeedback, meditation, or simply taking some time out to clear your mind. For more information, talk with your healthcare provider.  Follow-up care  Follow up with your healthcare provider, or as advised. Further testing and additional treatment may be required. If changes to your lifestyle do not improve your symptoms, talk with your healthcare provider about other available therapies. These include bite guards for help with teeth grinding, stress management techniques, and more. If stress is an important factor and does not respond to the above simple measures, talk to your doctor about a referral for stress management.    If X-rays were done, they will be reviewed by a specialist. You will be notified of the results, especially if they affect treatment.  Call 911  Call emergency services right  away if any of these occur:    Trouble breathing or swallowing, wheezing    Confusion    Extreme drowsiness or trouble awakening    Fainting or loss of consciousness    Rapid heart rate  When to seek medical advice  Call your healthcare provider right away if any of these occur:    Your face becomes swollen or red.    Your pain worsens.    You have increasing neck, mouth, tooth, or throat pain.    You develop a fever of 100.4F (38 C) or higher  Date Last Reviewed: 7/30/2015 2000-2017 [employer]. [address] [place] [address]. All rights reserved. This information is not intended as a substitute for professional medical care. Always follow your healthcare professional's instructions.        Dental Treatment for Temporomandibular Disorders (TMD)  You have been diagnosed with temporomandibular disorder (TMD).This term describes a group of problems related to the temporomandibular joint (TMJ) and nearby muscles. The TMJ is located where the upper and lower jaws meet and is part of a structural system that includes the teeth. Because the joint and teeth work together, a problem with your teeth and bite can be linked to TMD. If you grind your teeth or if you have a bad bite, your dentist may be able to help. If your teeth need to be realigned to improve your bite, you may be referred to an orthodontist.  If you grind or clench your teeth    Bruxism (teeth grinding) or clenching strains the TMJ and related muscles. If you have these habits during the day, doing self-checks can help you stop. But it s hard to control these habits when you re asleep. That s when the use of a splint can often help:    How a splint works. A splint is an appliance that fits in the mouth. It may also be called an orthotic or . There are different kinds of splints for different kinds of needs. A splint can keep the upper and lower teeth apart. This helps protect tooth surfaces from grinding. A splint can  also be made to reduce strain on the area.    Wearing and caring for your splint. To make a splint, your dentist or orthodontist may take impressions of your teeth. Then a splint will be made to fit your mouth. A splint:    May be worn during the day or only at night. Be sure to ask when and how often you should wear your splint.    Should be cleaned before you put it in and after you take it out. Ask your dentist or orthodontist how to clean the splint.    Should be kept in a protective case, away from the reach of children and pets. This helps keep the splint from getting dirty or broken.  If your bite is incorrect  Malocclusion means the jaws or teeth don t fit together properly. This can result in pain and problems with jaw function. If your jaws or teeth are out of alignment, orthodontic treatment may help. If your bad bite is due to missing or damaged teeth, you may receive restorative treatment.    Restorative treatment. A bad bite can be caused by missing or damaged teeth. A dentist can restore teeth in many ways:    A crown is made of ceramic, metal, or porcelain and metal. It is cemented in place to repair a broken or damaged tooth.    A bridge is a false tooth fused between 2 crowns.    A dental implant is an artificial tooth root. It is attached to the jawbone as a base for an artificial tooth.    Orthodontic treatment. Sometimes the upper and lower jaws are out of alignment. Or teeth are out of line, turned, crowded, or spaced too far apart. Your orthodontist can align teeth with braces and other devices. This helps provide a more comfortable bite.  If surgery is needed  Surgery is rarely needed for TMD. However, if other treatments haven t worked, you may be referred to an oral and maxillofacial surgeon. Talk to your dentist about whether surgery might be right for you.   Date Last Reviewed: 7/13/2015 2000-2017 [employer]. [address] [place] [address], [place] [address]. All rights  reserved. This information is not intended as a substitute for professional medical care. Always follow your healthcare professional's instructions.

## 2018-02-08 ENCOUNTER — MYC MEDICAL ADVICE (OUTPATIENT)
Dept: FAMILY MEDICINE | Facility: CLINIC | Age: 38
End: 2018-02-08

## 2018-02-08 NOTE — TELEPHONE ENCOUNTER
Patient called and will bring his paperwork in today. He does not have a fax number so will just need to be notified so he can pick it up.    Tianna [facility] [place]

## 2018-02-08 NOTE — TELEPHONE ENCOUNTER
Ina,  Please review my chart question, are you ok if patient drops off the paperwork?     DARRIUS Gerardo

## 2018-02-08 NOTE — TELEPHONE ENCOUNTER
Have him drop off the paper work.  He does not need to see me.  He can even fax the paper work in.  Please obtain a fax number from him so I can fax it back for him so he will not need to come back for the paper work.     Thanks Ina  (Routing comment)       Left message to call back. See instruction from Ina above, CSS, if patient returns call please let him know the message and get fax number so Ina can fax it back to him, thank you.    DARRIUS Gerardo

## 2018-02-08 NOTE — TELEPHONE ENCOUNTER
Routing to provider to update has no fax number and will bring in paper work.    DARRIUS Gerardo

## 2018-02-09 ENCOUNTER — TELEPHONE (OUTPATIENT)
Dept: FAMILY MEDICINE | Facility: CLINIC | Age: 38
End: 2018-02-09

## 2018-02-09 NOTE — TELEPHONE ENCOUNTER
Form completed by Ina from the  Clinic. She faxed the paperwork back. Pt would like to  at  . Paperwork is there available for .  Copy to scan.

## 2018-02-09 NOTE — TELEPHONE ENCOUNTER
[employer], Application for Disability Benefits and FMLA form form to Ina May. (she is out of office until 2/12)

## 2018-02-20 ENCOUNTER — APPOINTMENT (OUTPATIENT)
Dept: GENERAL RADIOLOGY | Facility: CLINIC | Age: 38
End: 2018-02-20
Attending: EMERGENCY MEDICINE
Payer: COMMERCIAL

## 2018-02-20 ENCOUNTER — HOSPITAL ENCOUNTER (EMERGENCY)
Facility: CLINIC | Age: 38
Discharge: HOME | End: 2018-02-20
Attending: EMERGENCY MEDICINE | Admitting: EMERGENCY MEDICINE
Payer: COMMERCIAL

## 2018-02-20 VITALS
HEART RATE: 80 BPM | RESPIRATION RATE: 7 BRPM | OXYGEN SATURATION: 96 % | BODY MASS INDEX: 31.83 KG/M2 | WEIGHT: 235 LBS | HEIGHT: 72 IN | DIASTOLIC BLOOD PRESSURE: 89 MMHG | TEMPERATURE: 97.8 F | SYSTOLIC BLOOD PRESSURE: 126 MMHG

## 2018-02-20 DIAGNOSIS — R07.9 ACUTE CHEST PAIN: ICD-10-CM

## 2018-02-20 LAB
ALBUMIN SERPL-MCNC: 4 G/DL (ref 3.4–5)
ALP SERPL-CCNC: 81 U/L (ref 40–150)
ALT SERPL W P-5'-P-CCNC: 30 U/L (ref 0–70)
ANION GAP SERPL CALCULATED.3IONS-SCNC: 4 MMOL/L (ref 3–14)
AST SERPL W P-5'-P-CCNC: 16 U/L (ref 0–45)
BASOPHILS # BLD AUTO: 0.1 10E9/L (ref 0–0.2)
BASOPHILS NFR BLD AUTO: 0.5 %
BILIRUB SERPL-MCNC: 0.2 MG/DL (ref 0.2–1.3)
BUN SERPL-MCNC: 9 MG/DL (ref 7–30)
CALCIUM SERPL-MCNC: 9.1 MG/DL (ref 8.5–10.1)
CHLORIDE SERPL-SCNC: 104 MMOL/L (ref 94–109)
CO2 SERPL-SCNC: 32 MMOL/L (ref 20–32)
CREAT SERPL-MCNC: 0.69 MG/DL (ref 0.66–1.25)
DIFFERENTIAL METHOD BLD: NORMAL
EOSINOPHIL # BLD AUTO: 0.3 10E9/L (ref 0–0.7)
EOSINOPHIL NFR BLD AUTO: 2.9 %
ERYTHROCYTE [DISTWIDTH] IN BLOOD BY AUTOMATED COUNT: 13 % (ref 10–15)
GFR SERPL CREATININE-BSD FRML MDRD: >90 ML/MIN/1.7M2
GLUCOSE SERPL-MCNC: 74 MG/DL (ref 70–99)
HCT VFR BLD AUTO: 43.9 % (ref 40–53)
HGB BLD-MCNC: 14.9 G/DL (ref 13.3–17.7)
IMM GRANULOCYTES # BLD: 0 10E9/L (ref 0–0.4)
IMM GRANULOCYTES NFR BLD: 0.2 %
LIPASE SERPL-CCNC: 137 U/L (ref 73–393)
LYMPHOCYTES # BLD AUTO: 2.7 10E9/L (ref 0.8–5.3)
LYMPHOCYTES NFR BLD AUTO: 27.1 %
MCH RBC QN AUTO: 27.2 PG (ref 26.5–33)
MCHC RBC AUTO-ENTMCNC: 33.9 G/DL (ref 31.5–36.5)
MCV RBC AUTO: 80 FL (ref 78–100)
MONOCYTES # BLD AUTO: 0.7 10E9/L (ref 0–1.3)
MONOCYTES NFR BLD AUTO: 6.8 %
NEUTROPHILS # BLD AUTO: 6.2 10E9/L (ref 1.6–8.3)
NEUTROPHILS NFR BLD AUTO: 62.5 %
PLATELET # BLD AUTO: 191 10E9/L (ref 150–450)
POTASSIUM SERPL-SCNC: 3.5 MMOL/L (ref 3.4–5.3)
PROT SERPL-MCNC: 7.8 G/DL (ref 6.8–8.8)
RBC # BLD AUTO: 5.47 10E12/L (ref 4.4–5.9)
SODIUM SERPL-SCNC: 140 MMOL/L (ref 133–144)
TROPONIN I SERPL-MCNC: <0.015 UG/L (ref 0–0.04)
WBC # BLD AUTO: 10 10E9/L (ref 4–11)

## 2018-02-20 PROCEDURE — 99285 EMERGENCY DEPT VISIT HI MDM: CPT | Mod: 25 | Performed by: EMERGENCY MEDICINE

## 2018-02-20 PROCEDURE — 80053 COMPREHEN METABOLIC PANEL: CPT | Performed by: EMERGENCY MEDICINE

## 2018-02-20 PROCEDURE — 93010 ELECTROCARDIOGRAM REPORT: CPT | Mod: Z6 | Performed by: EMERGENCY MEDICINE

## 2018-02-20 PROCEDURE — 93005 ELECTROCARDIOGRAM TRACING: CPT | Performed by: EMERGENCY MEDICINE

## 2018-02-20 PROCEDURE — 83690 ASSAY OF LIPASE: CPT | Performed by: EMERGENCY MEDICINE

## 2018-02-20 PROCEDURE — 84484 ASSAY OF TROPONIN QUANT: CPT | Performed by: EMERGENCY MEDICINE

## 2018-02-20 PROCEDURE — 71046 X-RAY EXAM CHEST 2 VIEWS: CPT

## 2018-02-20 PROCEDURE — 85025 COMPLETE CBC W/AUTO DIFF WBC: CPT | Performed by: EMERGENCY MEDICINE

## 2018-02-20 NOTE — ED AVS SNAPSHOT
[facility]    [address] [facility] [address]    Phone:  [phone]    Fax:  [phone]                                       Santo Toure   MRN: [id number]    Department:  [facility]   Date of Visit:  2/20/2018           After Visit Summary Signature Page     I have received my discharge instructions, and my questions have been answered. I have discussed any challenges I see with this plan with the nurse or doctor.    ..........................................................................................................................................  Patient/Patient Representative Signature      ..........................................................................................................................................  Patient Representative Print Name and Relationship to Patient    ..................................................               ................................................  Date                                            Time    ..........................................................................................................................................  Reviewed by Signature/Title    ...................................................              ..............................................  Date                                                            Time

## 2018-02-21 NOTE — ED PROVIDER NOTES
History   Chief complaint: Chest pain    The history is limited by a language barrier. A  was used ( was used.).      Patient is deaf.  A  was used.  Santo Toure is a 37 year old male who developed sudden onset of sharp central chest pain just right of midline in the central chest beginning at approximately noon this afternoon while shoveling.  Pain is constant, nonradiating, moderate in severity, exacerbated by deep inspiration, change in position and palpation.  He has tried nothing for pain relief.  No prior history of similar symptoms or exertional chest pains.  No shortness of breath, nausea or sweating/diaphoresis.  No acute cough, hemoptysis or leg pain or leg swelling.  Cardiac risk factors: Mother with premature coronary artery disease (MI at age 46)  PE/DVT risk factors: Denies    Problem List:    Patient Active Problem List    Diagnosis Date Noted     Pyelonephritis 10/07/2017     Priority: Medium     Complicated UTI (urinary tract infection) 10/07/2017     Priority: Medium     Kidney stone 09/13/2017     Priority: Medium        Past Medical History:    Past Medical History:   Diagnosis Date     Asthma      Deaf      Kidney stone 09/14/2017     PONV (postoperative nausea and vomiting)        Past Surgical History:    Past Surgical History:   Procedure Laterality Date     CYSTOSCOPY, RETROGRADES, INSERT STENT URETER(S), COMBINED Right 9/14/2017    Procedure: COMBINED CYSTOSCOPY, RETROGRADES, INSERT STENT URETER(S);  Cysto , right stent placement;  Surgeon: Pavel Lundy MD;  Location: [facility]     LASER HOLMIUM LITHOTRIPSY URETER(S), INSERT STENT, COMBINED Right 10/5/2017    Procedure: COMBINED CYSTOSCOPY, URETEROSCOPY, LASER HOLMIUM LITHOTRIPSY URETER(S), INSERT STENT;  Cystoscopy, right ureteroscopy, laser lithotripsy, stent exchange;  Surgeon: Pavel Lundy MD;  Location: [facility]       Family History:     Family History   Problem Relation Age of Onset     HEART DISEASE Mother      MI, also frequent kidney infections       Social History:  Marital Status:  Single [1]  Social History   Substance Use Topics     Smoking status: Never Smoker     Smokeless tobacco: Never Used     Alcohol use Yes      Comment: on occasion        Medications:      IBUPROFEN PO   ketorolac (TORADOL) 10 MG tablet   cyclobenzaprine (FLEXERIL) 10 MG tablet   acetaminophen (TYLENOL) 500 MG tablet       Review of Systems  As mentioned above in the history present illness.  All other systems were reviewed and are negative.    Physical Exam   BP: 132/89  Pulse: 80  Heart Rate: 68  Temp: 97.8  F (36.6  C)  Resp: 18  Height: 182.9 cm (6')  Weight: 106.6 kg (235 lb)  SpO2: 99 %      Physical Exam   Constitutional: He is oriented to person, place, and time. He appears well-developed and well-nourished. No distress.   HENT:   Head: Normocephalic and atraumatic.   Mouth/Throat: Oropharynx is clear and moist.   Eyes: Conjunctivae and EOM are normal. No scleral icterus.   Neck: Normal range of motion. Neck supple. No JVD present. No tracheal deviation present. No thyromegaly present.   Cardiovascular: Normal rate, regular rhythm, normal heart sounds and intact distal pulses.  Exam reveals no gallop and no friction rub.    No murmur heard.  Pulmonary/Chest: Effort normal and breath sounds normal. No respiratory distress. He has no decreased breath sounds. He has no wheezes. He has no rhonchi. He has no rales. He exhibits tenderness ( Chest wall tenderness as diagrammed.) and bony tenderness. He exhibits no crepitus, no edema, no deformity, no swelling and no retraction.       Abdominal: Soft. Bowel sounds are normal. He exhibits no distension. There is no tenderness.   Musculoskeletal: Normal range of motion. He exhibits no edema or tenderness.   No palpable lower extremity edema or tenderness.   Neurological: He is alert and oriented to person, place, and  time.   Skin: Skin is warm and dry. No rash noted. He is not diaphoretic. No erythema. No pallor.   Psychiatric: He has a normal mood and affect. His behavior is normal.   Nursing note and vitals reviewed.      ED Course     ED Course     Procedures         EKG Interpretation:      Interpreted by Julio Teague MD  Time reviewed: 1915 p.m.  Symptoms at time of EKG: Chest pain  Rhythm: Normal sinus   Rate: Normal  Axis: Normal  Ectopy: None  Conduction: Normal  ST Segments/ T Waves: No ST-T wave changes. No acute ischemic changes.  Q Waves: None  Comparison to prior: Unchanged  Clinical Impression: normal EKG            Results for orders placed or performed during the hospital encounter of 02/20/18   XR Chest 2 Views    Narrative    CHEST TWO VIEWS   2/20/2018 7:40 PM     HISTORY: Pleuritic anterior chest pain.    COMPARISON: 1/12/2017.      Impression    IMPRESSION: Lung volumes are slightly low. There is a streaky opacity  at the left lung base only seen on the frontal view that may represent  atelectasis. No significant pleural effusion or pneumothorax. Cardiac  silhouette within normal limits.    PHILOMENA QUINTANILLA MD   CBC with platelets, differential   Result Value Ref Range    WBC 10.0 4.0 - 11.0 10e9/L    RBC Count 5.47 4.4 - 5.9 10e12/L    Hemoglobin 14.9 13.3 - 17.7 g/dL    Hematocrit 43.9 40.0 - 53.0 %    MCV 80 78 - 100 fl    MCH 27.2 26.5 - 33.0 pg    MCHC 33.9 31.5 - 36.5 g/dL    RDW 13.0 10.0 - 15.0 %    Platelet Count 191 150 - 450 10e9/L    Diff Method Automated Method     % Neutrophils 62.5 %    % Lymphocytes 27.1 %    % Monocytes 6.8 %    % Eosinophils 2.9 %    % Basophils 0.5 %    % Immature Granulocytes 0.2 %    Absolute Neutrophil 6.2 1.6 - 8.3 10e9/L    Absolute Lymphocytes 2.7 0.8 - 5.3 10e9/L    Absolute Monocytes 0.7 0.0 - 1.3 10e9/L    Absolute Eosinophils 0.3 0.0 - 0.7 10e9/L    Absolute Basophils 0.1 0.0 - 0.2 10e9/L    Abs Immature Granulocytes 0.0 0 - 0.4 10e9/L   Comprehensive metabolic  panel   Result Value Ref Range    Sodium 140 133 - 144 mmol/L    Potassium 3.5 3.4 - 5.3 mmol/L    Chloride 104 94 - 109 mmol/L    Carbon Dioxide 32 20 - 32 mmol/L    Anion Gap 4 3 - 14 mmol/L    Glucose 74 70 - 99 mg/dL    Urea Nitrogen 9 7 - 30 mg/dL    Creatinine 0.69 0.66 - 1.25 mg/dL    GFR Estimate >90 >60 mL/min/1.7m2    GFR Estimate If Black >90 >60 mL/min/1.7m2    Calcium 9.1 8.5 - 10.1 mg/dL    Bilirubin Total 0.2 0.2 - 1.3 mg/dL    Albumin 4.0 3.4 - 5.0 g/dL    Protein Total 7.8 6.8 - 8.8 g/dL    Alkaline Phosphatase 81 40 - 150 U/L    ALT 30 0 - 70 U/L    AST 16 0 - 45 U/L   Lipase   Result Value Ref Range    Lipase 137 73 - 393 U/L   Troponin I   Result Value Ref Range    Troponin I ES <0.015 0.000 - 0.045 ug/L        Medications - No data to display  Patient declined analgesic.    Assessments & Plan (with Medical Decision Making)   37-year-old male who has approximately 7 hours of chest pain just right of center in the mid chest which occurred while shoveling snow at approximately noon today.  He has a reproducible chest wall pain with normal EKG, troponin, LFTs and lipase, and chest x-ray.  Doubt atypical angina/ACS, aneurysm/dissection, PE/DVT, pneumothorax or emergent cardiovascular, pulmonary or GI or  disease process.  He declined an opiate analgesic and will use NSAID and was instructed in supportive care.  Recommended clinic recheck later this week if not improving.  Return for new or worsening symptoms, or for new problems or concerns.    I have reviewed the nursing notes.    I have reviewed the findings, diagnosis, plan and need for follow up with the patient.    New Prescriptions    Ibuprofen 600 mg po q 6 hrs prn (OTC)           Final diagnoses:   Acute chest pain - Probable musculoskeletal chest wall pain       2/20/2018   [facility] EMERGENCY DEPARTMENT     Julio Teague MD  02/20/18 0214

## 2018-02-21 NOTE — DISCHARGE INSTRUCTIONS
*CHEST PAIN, NONCARDIAC    Based on your visit today, the exact cause of your chest pain is not certain. Your condition does not seem serious and your pain does not appear to be coming from your heart. However, sometimes the signs of a serious problem take more time to appear. Therefore, please watch for the warning signs listed below.  HOME CARE:  1. Rest today and avoid strenuous activity.  2. Take any prescribed medicine as directed.  FOLLOW UP with your doctor in 1-3 days.   GET PROMPT MEDICAL ATTENTION if any of the following occur:    A change in the type of pain: if it feels different, becomes more severe, lasts longer, or begins to spread into your shoulder, arm, neck, jaw or back    Shortness of breath or increased pain with breathing    Cough with blood or dark colored sputum (phlegm)    Weakness, dizziness, or fainting    Fever over 101  F (38.3  C)    Swelling, pain or redness in one leg    0685-5387 [employer]. [address] [place] [address]. All rights reserved. This information is not intended as a substitute for professional medical care. Always follow your healthcare professional's instructions.  This information has been modified by your health care provider with permission from the publisher.

## 2018-02-21 NOTE — ED NOTES
Pt arrives complaining of 9/10 chest pain, steady, onset at noon, nausea no emesis, no cardiac history,  has been called will arrive at approx 19:30 initiated IPAD  services, Dr Teague at bedside

## 2018-08-04 ENCOUNTER — APPOINTMENT (OUTPATIENT)
Dept: ULTRASOUND IMAGING | Facility: CLINIC | Age: 38
End: 2018-08-04
Attending: EMERGENCY MEDICINE
Payer: COMMERCIAL

## 2018-08-04 ENCOUNTER — HOSPITAL ENCOUNTER (EMERGENCY)
Facility: CLINIC | Age: 38
Discharge: HOME | End: 2018-08-04
Attending: EMERGENCY MEDICINE | Admitting: EMERGENCY MEDICINE
Payer: COMMERCIAL

## 2018-08-04 VITALS
DIASTOLIC BLOOD PRESSURE: 87 MMHG | HEIGHT: 72 IN | RESPIRATION RATE: 18 BRPM | OXYGEN SATURATION: 98 % | BODY MASS INDEX: 31.15 KG/M2 | WEIGHT: 230 LBS | SYSTOLIC BLOOD PRESSURE: 142 MMHG | HEART RATE: 69 BPM | TEMPERATURE: 97.8 F

## 2018-08-04 DIAGNOSIS — T80.1XXA PHLEBITIS AFTER INFUSION, INITIAL ENCOUNTER: ICD-10-CM

## 2018-08-04 DIAGNOSIS — I80.9 PHLEBITIS AFTER INFUSION, INITIAL ENCOUNTER: ICD-10-CM

## 2018-08-04 PROCEDURE — 99284 EMERGENCY DEPT VISIT MOD MDM: CPT | Mod: 25

## 2018-08-04 PROCEDURE — 93971 EXTREMITY STUDY: CPT | Mod: RT

## 2018-08-04 PROCEDURE — 99284 EMERGENCY DEPT VISIT MOD MDM: CPT | Mod: Z6 | Performed by: EMERGENCY MEDICINE

## 2018-08-04 NOTE — ED AVS SNAPSHOT
[facility] Emergency Department    [address] [address]    Phone:  [phone]    Fax:  [phone]                                       Santo Toure   MRN: [id number]    Department:  [facility] Emergency Department   Date of Visit:  8/4/2018           Patient Information     Date Of Birth          1980        Your diagnoses for this visit were:     Phlebitis after infusion, initial encounter- right forearm/elbow        You were seen by Adam Apple DO.        Discharge Instructions       Comfort should slowly dissipate and resolve the next 5-7 days.  Using ibuprofen and applying heat will help resolve symptoms.    24 Hour Appointment Hotline       To make an appointment at any [facility] clinic, call [phone] ([phone]). If you don't have a family doctor or clinic, we will help you find one. [place] [facility] clinics are conveniently located to serve the needs of you and your family.             Review of your medicines      Our records show that you are taking the medicines listed below. If these are incorrect, please call your family doctor or clinic.        Dose / Directions Last dose taken    acetaminophen 500 MG tablet   Commonly known as:  TYLENOL   Dose:  1000 mg   Quantity:  1 Bottle        Take 2 tablets (1,000 mg) by mouth every 6 hours as needed for mild pain or fever   Refills:  0        cyclobenzaprine 10 MG tablet   Commonly known as:  FLEXERIL   Dose:  5-10 mg   Quantity:  30 tablet        Take 0.5-1 tablets (5-10 mg) by mouth 3 times daily as needed for muscle spasms   Refills:  1        IBUPROFEN PO   Dose:  400-600 mg        Take 400-600 mg by mouth every 6 hours as needed for moderate pain   Refills:  0        ketorolac 10 MG tablet   Commonly known as:  TORADOL   Dose:  10 mg   Quantity:  20 tablet        Take 1 tablet (10 mg) by mouth every 6 hours as needed for moderate pain   Refills:  0                Procedures and tests performed  during your visit     US Upper Extremity Venous Duplex Right      Orders Needing Specimen Collection     None      Pending Results     Date and Time Order Name Status Description    8/4/2018 2149 US Upper Extremity Venous Duplex Right In process             Pending Culture Results     No orders found from 8/2/2018 to 8/5/2018.            Pending Results Instructions     If you had any lab results that were not finalized at the time of your Discharge, you can call the ED Lab Result RN at [phone]. You will be contacted by this team for any positive Lab results or changes in treatment. The nurses are available 7 days a week from 10A to 6:30P.  You can leave a message 24 hours per day and they will return your call.        Test Results From Your Hospital Stay        8/4/2018 10:30 PM      Result not yet available     Exam Ended                Thank you for choosing [facility]       Thank you for choosing [facility] for your care. Our goal is always to provide you with excellent care. Hearing back from our patients is one way we can continue to improve our services. Please take a few minutes to complete the written survey that you may receive in the mail after you visit with us. Thank you!        [employer] Information     [employer] gives you secure access to your electronic health record. If you see a primary care provider, you can also send messages to your care team and make appointments. If you have questions, please call your primary care clinic.  If you do not have a primary care provider, please call [phone] and they will assist you.        Care EveryWhere ID     This is your Care EveryWhere ID. This could be used by other organizations to access your [facility] medical records  [id number]        Equal Access to Services     ANTONINA ALBA : Drew Victoria, krista magaña, qaybta mookie morris. So [facility] [phone].    ATENCIÓN: nette Obando  a marshall disposición servicios gratuitos de asistencia lingüística. Llmauri al [phone].    We comply with applicable federal civil rights laws and [place] laws. We do not discriminate on the basis of race, color, national origin, age, disability, sex, sexual orientation, or gender identity.            After Visit Summary       This is your record. Keep this with you and show to your community pharmacist(s) and doctor(s) at your next visit.

## 2018-08-04 NOTE — ED AVS SNAPSHOT
[facility] Emergency Department    [address] [place] [address]    Phone:  [phone]    Fax:  [phone]                                       Santo Toure   MRN: [id number]    Department:  [facility] Emergency Department   Date of Visit:  8/4/2018           After Visit Summary Signature Page     I have received my discharge instructions, and my questions have been answered. I have discussed any challenges I see with this plan with the nurse or doctor.    ..........................................................................................................................................  Patient/Patient Representative Signature      ..........................................................................................................................................  Patient Representative Print Name and Relationship to Patient    ..................................................               ................................................  Date                                            Time    ..........................................................................................................................................  Reviewed by Signature/Title    ...................................................              ..............................................  Date                                                            Time

## 2018-08-05 NOTE — ED PROVIDER NOTES
History     Chief Complaint   Patient presents with     Arm Pain     R      HPI     21:30  Patient is hearing impaired/deaf.  Requesting a live .  Does not want to use remote virtual/video  nor does he want to use his wife who communicated with him through sign language.  He is aware that this could take many hours on a Saturday night to find a live  who is willing to drive up to the emergency department.    Care will be significantly delayed due to the patient's insistence on a live .      Santo Toure is a 38 year old male who presented complaints of right forearm and elbow pain.  States he had a IV placed this past Sunday at outside healthcare facility([facility]) where he received IV fluids and Zofran and Compazine.  Complaining of discomfort scribed as an ache.  It is not worse with movement or use.  He has had no fever chills.  Has noted no warmth or redness over the elbow.        Problem List:    Patient Active Problem List    Diagnosis Date Noted     Pyelonephritis 10/07/2017     Priority: Medium     Complicated UTI (urinary tract infection) 10/07/2017     Priority: Medium     Kidney stone 09/13/2017     Priority: Medium        Past Medical History:    Past Medical History:   Diagnosis Date     Asthma      Deaf      Kidney stone 09/14/2017     PONV (postoperative nausea and vomiting)        Past Surgical History:    Past Surgical History:   Procedure Laterality Date     CYSTOSCOPY, RETROGRADES, INSERT STENT URETER(S), COMBINED Right 9/14/2017    Procedure: COMBINED CYSTOSCOPY, RETROGRADES, INSERT STENT URETER(S);  Cysto , right stent placement;  Surgeon: Pavel Lundy MD;  Location: [facility] OR     LASER HOLMIUM LITHOTRIPSY URETER(S), INSERT STENT, COMBINED Right 10/5/2017    Procedure: COMBINED CYSTOSCOPY, URETEROSCOPY, LASER HOLMIUM LITHOTRIPSY URETER(S), INSERT STENT;  Cystoscopy, right ureteroscopy, laser lithotripsy,  stent exchange;  Surgeon: Pavel Lundy MD;  Location: [facility] OR       Family History:    Family History   Problem Relation Age of Onset     HEART DISEASE Mother      MI, also frequent kidney infections       Social History:  Marital Status:  Single [1]  Social History   Substance Use Topics     Smoking status: Never Smoker     Smokeless tobacco: Never Used     Alcohol use Yes      Comment: on occasion        Medications:      acetaminophen (TYLENOL) 500 MG tablet   cyclobenzaprine (FLEXERIL) 10 MG tablet   IBUPROFEN PO   ketorolac (TORADOL) 10 MG tablet         Review of Systems  All pertinent positives and negatives are documented in the HPI.  All others reviewed and are negative .  Denies any chest pain or shortness of breath.  Physical Exam   BP: 142/87  Pulse: 69  Temp: 97.8  F (36.6  C)  Resp: 18  Height: 182.9 cm (6')  Weight: 104.3 kg (230 lb)  SpO2: 98 %      Physical Exam  Vital signs reviewed  Lungs are clear to auscultation  Heart is regular without ectopy or murmur  Right upper extremity shows previous phlebotomy site on the volar side of the left forearm approximately 8 cm distal to the elbow crease.  There is no warmth or erythema.  There is demarcated by just small red dot with some adhesive tape still adherent to the skin.  Patient subjectively complaining of pain right in the antecubital space of the elbow.  His elbow shows normal range of motion for flexion, extension, supination and pronation.  The forearm elbow and upper arm did not show any warmth, erythema, soft tissue swelling.  There is no ropiness palpating the antecubital area along the soft tissue tracts of the cephalic or basilic vein.  No cellulitic process.  Hesham's test was normal.  Normal reflexes.  ED Course     ED Course     Procedures          Results for orders placed or performed during the hospital encounter of 08/04/18   US Upper Extremity Venous Duplex Right    Narrative    US UPPER EXTREMITY VENOUS DUPLEX RIGHT    8/4/2018 11:08 PM     HISTORY: Pain; antecubital soft tissue swelling after placement of IV  in right forearm 6 days ago.     COMPARISON: None.    FINDINGS: Gray-scale, color and Doppler spectral analysis ultrasound  was performed of the right arm. Compression and augmentation imaging  was performed.    There is no evidence for deep venous thrombosis. The veins compress  and augment normally.      Impression    IMPRESSION: No deep venous thrombosis.         Assessments & Plan (with Medical Decision Making)  30 atrial male presents with right forearm and elbow pain.  Post phlebotomy for IV start this past Sunday.  Infused was saline, Zofran and Compazine.  There was no extravasation.  No difficulty cannulating the vein.  Complaining of discomfort in the antecubital area.  Examination noted no soft tissue swelling, warmth to the tissue or erythema.  Nothing to support infectious concerns.  There is no disruption to arterial flow with normal Hesham's test all the way down to the palmar arch.  I did not feel any inflammation directly over the antecubital area or palpating along the pathways of the cephalic and basilic veins.  Ultrasound showed no concerns for any soft tissue injury fluid collections or DVT.  Patient discharged home with instructions for ibuprofen, use as tolerated, warm compresses.     I have reviewed the nursing notes.    I have reviewed the findings, diagnosis, plan and need for follow up with the patient.      New Prescriptions    No medications on file       Final diagnoses:   Phlebitis after infusion, initial encounter- right forearm/elbow       8/4/2018   [facility] EMERGENCY DEPARTMENT     Adam Apple DO  08/04/18 1090

## 2018-08-05 NOTE — DISCHARGE INSTRUCTIONS
Comfort should slowly dissipate and resolve the next 5-7 days.  Using ibuprofen and applying heat will help resolve symptoms.

## 2018-08-05 NOTE — ED NOTES
Pt updated that we are still awaiting notification for a time for the in person .  Pt is now agreeable to the video .  MD is aware.  Pt offers no needs at this time.

## 2018-08-05 NOTE — ED NOTES
Pt presents with wife interpreting for him.  He has complaints of pain to his left arm. He is concerned as he had a recent IV in that arm.  He is declining the video  at this time and is requesting an in person .  He is ok with his wife interpreting until they arrive but declines to have her interpret the entire visit. Call was placed by charge RN for an in person .  Pt is aware of this.

## 2018-08-05 NOTE — ED TRIAGE NOTES
"Was seen in [facility] ER on Sunday had IV in R AC Monday began to develop pain has progressively worsened   Is concerned IV \"hit something in there\"  Requesting live sign language interp  "

## 2018-08-08 ENCOUNTER — HOSPITAL ENCOUNTER (EMERGENCY)
Facility: CLINIC | Age: 38
Discharge: HOME | End: 2018-08-08
Attending: NURSE PRACTITIONER | Admitting: NURSE PRACTITIONER
Payer: COMMERCIAL

## 2018-08-08 ENCOUNTER — APPOINTMENT (OUTPATIENT)
Dept: GENERAL RADIOLOGY | Facility: CLINIC | Age: 38
End: 2018-08-08
Attending: NURSE PRACTITIONER
Payer: COMMERCIAL

## 2018-08-08 VITALS
OXYGEN SATURATION: 97 % | HEIGHT: 72 IN | HEART RATE: 73 BPM | TEMPERATURE: 97.9 F | RESPIRATION RATE: 16 BRPM | DIASTOLIC BLOOD PRESSURE: 83 MMHG | SYSTOLIC BLOOD PRESSURE: 137 MMHG

## 2018-08-08 DIAGNOSIS — M25.521 RIGHT ELBOW PAIN: Primary | ICD-10-CM

## 2018-08-08 PROCEDURE — 99213 OFFICE O/P EST LOW 20 MIN: CPT | Performed by: NURSE PRACTITIONER

## 2018-08-08 PROCEDURE — 73070 X-RAY EXAM OF ELBOW: CPT | Mod: RT

## 2018-08-08 PROCEDURE — G0463 HOSPITAL OUTPT CLINIC VISIT: HCPCS

## 2018-08-08 PROCEDURE — 25000132 ZZH RX MED GY IP 250 OP 250 PS 637: Performed by: NURSE PRACTITIONER

## 2018-08-08 RX ORDER — IBUPROFEN 200 MG
800 TABLET ORAL ONCE
Status: COMPLETED | OUTPATIENT
Start: 2018-08-08 | End: 2018-08-08

## 2018-08-08 RX ORDER — HYDROCODONE BITARTRATE AND ACETAMINOPHEN 5; 325 MG/1; MG/1
1 TABLET ORAL EVERY 4 HOURS PRN
Qty: 18 TABLET | Refills: 0 | Status: SHIPPED | OUTPATIENT
Start: 2018-08-08 | End: 2020-03-10

## 2018-08-08 RX ADMIN — IBUPROFEN 800 MG: 200 TABLET, FILM COATED ORAL at 18:59

## 2018-08-08 ASSESSMENT — ENCOUNTER SYMPTOMS
ABDOMINAL PAIN: 0
SORE THROAT: 0
WHEEZING: 0
DIFFICULTY URINATING: 0
FEVER: 0
VOMITING: 0
ARTHRALGIAS: 1
DIARRHEA: 0
COUGH: 0
EYE PAIN: 0
CHILLS: 0
NAUSEA: 0
CONSTIPATION: 0
SHORTNESS OF BREATH: 0
SINUS PAIN: 0
DYSURIA: 0
WOUND: 0
DIAPHORESIS: 0
LIGHT-HEADEDNESS: 0

## 2018-08-08 NOTE — ED PROVIDER NOTES
History     Chief Complaint   Patient presents with     Arm Pain     was seen on saturday, was told if it wasn't getting better to return. thinks he slept on it wrong     HPI  Santo Toure is a 38 year old male who admits to past medical history of asthma, deafness, Ménière's disease who presents to the urgent care with ongoing persistent elbow pain that is now radiated into his shoulder.  Patient was seen on August 4 for similar symptoms in the emergency department.  He had been seen prior to that on July 31 for Ménière's disease problems in [place] and was given an IV with Compazine and fluids.  It was thought on August 4 that his pain may have been related to the IV and he was advised to do warm packs and this did not help his pain.  Patient reports that he has pain now in his forearm as well and up into his shoulder as well.  He denies any trauma.  He denies any swelling, fever, aches, chills.  He does report that intermittently his fingers are tingly but they are not numb.  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  He describes the pain as sharp and a 6-7 out of 10 with rest and a 10 out of 10 with movement.  He has been taking ibuprofen 600 mg 3 times a day and this has not been helpful and then he has also tried taking 1000 mg of Tylenol twice daily and he did not find this helpful either.  He denies any history of bleeding or clotting disorders and denies any history of GI bleeds.  He denies any previous history of similar problems.    Problem List:    Patient Active Problem List    Diagnosis Date Noted     Pyelonephritis 10/07/2017     Priority: Medium     Complicated UTI (urinary tract infection) 10/07/2017     Priority: Medium     Kidney stone 09/13/2017     Priority: Medium        Past Medical History:    Past Medical History:   Diagnosis Date     Asthma      Deaf      Kidney  stone 09/14/2017     PONV (postoperative nausea and vomiting)        Past Surgical History:    Past Surgical History:   Procedure Laterality Date     CYSTOSCOPY, RETROGRADES, INSERT STENT URETER(S), COMBINED Right 9/14/2017    Procedure: COMBINED CYSTOSCOPY, RETROGRADES, INSERT STENT URETER(S);  Cysto , right stent placement;  Surgeon: Pavel Lundy MD;  Location: [facility]     LASER HOLMIUM LITHOTRIPSY URETER(S), INSERT STENT, COMBINED Right 10/5/2017    Procedure: COMBINED CYSTOSCOPY, URETEROSCOPY, LASER HOLMIUM LITHOTRIPSY URETER(S), INSERT STENT;  Cystoscopy, right ureteroscopy, laser lithotripsy, stent exchange;  Surgeon: Pavel Lundy MD;  Location: [facility] OR       Family History:    Family History   Problem Relation Age of Onset     HEART DISEASE Mother      MI, also frequent kidney infections       Social History:  Marital Status:  Single [1]  Social History   Substance Use Topics     Smoking status: Never Smoker     Smokeless tobacco: Never Used     Alcohol use Yes      Comment: on occasion        Medications:      HYDROcodone-acetaminophen (NORCO) 5-325 MG per tablet   acetaminophen (TYLENOL) 500 MG tablet   cyclobenzaprine (FLEXERIL) 10 MG tablet   IBUPROFEN PO   ketorolac (TORADOL) 10 MG tablet         Review of Systems   Constitutional: Negative for chills, diaphoresis and fever.   HENT: Negative for ear pain, sinus pain and sore throat.    Eyes: Negative for pain.   Respiratory: Negative for cough, shortness of breath and wheezing.    Cardiovascular: Negative for chest pain.   Gastrointestinal: Negative for abdominal pain, constipation, diarrhea, nausea and vomiting.   Genitourinary: Negative for difficulty urinating and dysuria.   Musculoskeletal: Positive for arthralgias (right elbow pain).   Skin: Negative for rash and wound.   Neurological: Negative for light-headedness.   All other systems reviewed and are negative.      Physical Exam   BP: 137/83  Pulse: 73  Temp: 97.9  F  (36.6  C)  Resp: 16  Height: 182.9 cm (6')  SpO2: 97 %      Physical Exam   Constitutional: He appears well-developed and well-nourished. No distress.   HENT:   Head: Normocephalic and atraumatic.   Eyes: Conjunctivae are normal.   Cardiovascular: Normal rate, regular rhythm and normal heart sounds.  Exam reveals no gallop and no friction rub.    No murmur heard.  Pulmonary/Chest: Effort normal and breath sounds normal. No respiratory distress. He has no wheezes. He has no rales. He exhibits no tenderness.   Musculoskeletal:        Right forearm: He exhibits tenderness (noted over medial mid forearm and radial/ulnar head region ). He exhibits no bony tenderness, no swelling, no edema, no deformity and no laceration.   Skin: Skin is warm and dry. No rash noted. He is not diaphoretic. No erythema. No pallor.   Psychiatric: He has a normal mood and affect.   Nursing note and vitals reviewed.      ED Course     ED Course     Procedures      Results for orders placed or performed during the hospital encounter of 08/08/18 (from the past 24 hour(s))   Elbow XR, 2 views, right    Narrative    ELBOW RIGHT TWO VIEWS  8/8/2018 7:07 PM     HISTORY: Right elbow pain persistent and radiating pain to right  shoulder.     COMPARISON: None.      Impression    IMPRESSION: No bony or soft tissue abnormality. No joint space  effusion.       Medications   ibuprofen (ADVIL/MOTRIN) tablet 800 mg (800 mg Oral Given 8/8/18 1859)       Assessments & Plan (with Medical Decision Making)     I have reviewed the nursing notes.    I have reviewed the findings, diagnosis, plan and need for follow up with the patient.  This a 38-year-old male who presents to urgent care with right elbow pain with radiation down his arm and up into his shoulder without any acute trauma.  Patient has been unsuccessfully treating it with Tylenol and ibuprofen at home.  He reports that he has some finger tip tingling but denies any numbness.  X-ray completed to rule  out fracture or dislocation.  On exam there is no obvious deformities; there is no obvious olecranon bursitis.   Tinel's is negative and Phalen's is negative.  There is no obvious swelling.  Patient has tenderness noted on the mid medial forearm and also noted over the generalized elbow region region.  Suspect musculoskeletal and likely read due to some repetitive motion unable to define at this point in time her origin is at the elbow or in the shoulder.  Cannot completely exclude a neuromuscular related etiology.  Advised patient to take Tylenol and ibuprofen at 6 dosages and if pain is still unmanageable then can take hydrocodone.  Did caution patient on risks and benefits of hydrocodone.  Referral made to orthopedics in follow-up.  Also recommend patient seek primary care provider establishment.  Patient verbalizes understanding and was discharged in stable condition.  Work note made to be off work until next Thursday.  Discharge Medication List as of 8/8/2018  8:26 PM      START taking these medications    Details   HYDROcodone-acetaminophen (NORCO) 5-325 MG per tablet Take 1 tablet by mouth every 4 hours as needed for pain, Disp-18 tablet, R-0, Local Print             Final diagnoses:   Right elbow pain       8/8/2018   [facility] EMERGENCY DEPARTMENT     Corrine Kee, ROSSY CNP  08/08/18 5338

## 2018-08-08 NOTE — ED AVS SNAPSHOT
[facility] Emergency Department    [address] [address]    Phone:  [phone]    Fax:  [phone]                                       Santo Toure   MRN: [id number]    Department:  [facility] Emergency Department   Date of Visit:  8/8/2018           After Visit Summary Signature Page     I have received my discharge instructions, and my questions have been answered. I have discussed any challenges I see with this plan with the nurse or doctor.    ..........................................................................................................................................  Patient/Patient Representative Signature      ..........................................................................................................................................  Patient Representative Print Name and Relationship to Patient    ..................................................               ................................................  Date                                            Time    ..........................................................................................................................................  Reviewed by Signature/Title    ...................................................              ..............................................  Date                                                            Time

## 2018-08-08 NOTE — ED NOTES
Return to  with concerns of ongoing pain in his right arm. Was told to apply heat and take meds for 5 days, reeval if no improvement. Pt returns because he was at work and the pain was getting really bad. Pain is worse when closing his hand. Pt states the pain started 2 days after having an IV, the pain traveled up the arm, now it is all through the arm and back down to his hand.

## 2018-08-08 NOTE — ED AVS SNAPSHOT
[facility]    [address]    [facility] [address]    Phone:  [phone]    Fax:  [phone]                                       Santo Toure   MRN: [id number]    Department:  [facility]   Date of Visit:  8/8/2018           Patient Information     Date Of Birth          1980        Your diagnoses for this visit were:     Right elbow pain        You were seen by Corrine Kee APRN CNP.      Follow-up Information     Follow up with Clinic, [facility] In 3 days.    Contact information:    [address] [place] [address] [id number] [phone]          Discharge Instructions         I want you to take ibuprofen 600 mg +2 extra strength Tylenol every 6-8 hours as needed for pain.  If you are still having severe pain substitute 1 hydrocodone in place of 1 or 2 of the extra strength Tylenol.  When taking hydrocodone this is a narcotic and you should not operate heavy equipment or drive or work.  It may cause drowsiness dizziness or upset stomach.  It might also cause constipation.  You should take a stool softener when taking any narcotic.  I want you to take time off of work and rest your arm as your work may be aggravating your elbow and shoulder pain.  I also want you to follow-up with orthopedics prior to returning to work.  You also need to establish care with a primary care provider.    Arthralgia    Arthralgia is the term for pain in or around the joint. It is a symptom, not a disease. This pain may involve one or more joints. In some cases, the pain moves from joint to joint.  There are many causes for joint pain. These include:    Injury    Osteoarthritis (wearing out of the joint surface)    Gout (inflammation of the joint due to crystals in the joint fluid)    Infection inside the joint      Bursitis (inflammation of the fluid-filled sacs around the joint)    Autoimmune disorders such as rheumatoid arthritis or  lupus    Tendonitis (inflammation of chords that attach muscle to bone)  Home care    Rest the involved joint(s) until your symptoms improve.     You may be prescribed pain medicine. If none is prescribed, you may use acetaminophen or ibuprofen to control pain and inflammation.  Follow-up care  Follow up with your healthcare provider or as advised.  When to seek medical advice  Contact your healthcare provider right away if any of the following occurs:    Pain, swelling, or redness of joint increases    Pain worsens or recurs after a period of improvement    Pain moves to other joints    You cannot bear weight on the affected joint     You cannot move the affected joint    Joint appears deformed    New rash appears    Fever of 100.4 F (38 C) or higher, or as directed by your healthcare provider  Date Last Reviewed: 3/1/2017    3581-6036 [employer]. [address] [place] [address]. All rights reserved. This information is not intended as a substitute for professional medical care. Always follow your healthcare professional's instructions.          24 Hour Appointment Hotline       To make an appointment at any [facility] clinic, call [phone] ([phone]). If you don't have a family doctor or clinic, we will help you find one. [facility] clinics are conveniently located to serve the needs of you and your family.          ED Discharge Orders     ORTHO  REFERRAL       [facility] is referring you to the Orthopedic  Services at [facility].       The  Representative will assist you in the coordination of your Orthopedic and Musculoskeletal Care as prescribed by your physician.    The  Representative will call you within 1 business day to help schedule your appointment, or you may contact the  Representative at:    All areas ~ [phone]     Type of Referral : Non Surgical       Timeframe requested: 3 - 5  days    Coverage of these services is subject to the terms and limitations of your health insurance plan.  Please call member services at your health plan with any benefit or coverage questions.      If X-rays, CT or MRI's have been performed, please contact the facility where they were done to arrange for , prior to your scheduled appointment.  Please bring this referral request to your appointment and present it to your specialist.                     Review of your medicines      START taking        Dose / Directions Last dose taken    HYDROcodone-acetaminophen 5-325 MG per tablet   Commonly known as:  NORCO   Dose:  1 tablet   Quantity:  18 tablet        Take 1 tablet by mouth every 4 hours as needed for pain   Refills:  0          Our records show that you are taking the medicines listed below. If these are incorrect, please call your family doctor or clinic.        Dose / Directions Last dose taken    acetaminophen 500 MG tablet   Commonly known as:  TYLENOL   Dose:  1000 mg   Quantity:  1 Bottle        Take 2 tablets (1,000 mg) by mouth every 6 hours as needed for mild pain or fever   Refills:  0        cyclobenzaprine 10 MG tablet   Commonly known as:  FLEXERIL   Dose:  5-10 mg   Quantity:  30 tablet        Take 0.5-1 tablets (5-10 mg) by mouth 3 times daily as needed for muscle spasms   Refills:  1        IBUPROFEN PO   Dose:  400-600 mg        Take 400-600 mg by mouth every 6 hours as needed for moderate pain   Refills:  0        ketorolac 10 MG tablet   Commonly known as:  TORADOL   Dose:  10 mg   Quantity:  20 tablet        Take 1 tablet (10 mg) by mouth every 6 hours as needed for moderate pain   Refills:  0                Information about OPIOIDS     PRESCRIPTION OPIOIDS: WHAT YOU NEED TO KNOW   We gave you an opioid (narcotic) pain medicine. It is important to manage your pain, but opioids are not always the best choice. You should first try all the other options your care team gave you. Take  this medicine for as short a time (and as few doses) as possible.    Some activities can increase your pain, such as bandage changes or therapy sessions. It may help to take your pain medicine 30 to 60 minutes before these activities. Reduce your stress by getting enough sleep, working on hobbies you enjoy and practicing relaxation or meditation. Talk to your care team about ways to manage your pain beyond prescription opioids.    These medicines have risks:    DO NOT drive when on new or higher doses of pain medicine. These medicines can affect your alertness and reaction times, and you could be arrested for driving under the influence (DUI). If you need to use opioids long-term, talk to your care team about driving.    DO NOT operate heavy machinery    DO NOT do any other dangerous activities while taking these medicines.    DO NOT drink any alcohol while taking these medicines.     If the opioid prescribed includes acetaminophen, DO NOT take with any other medicines that contain acetaminophen. Read all labels carefully. Look for the word  acetaminophen  or  Tylenol.  Ask your pharmacist if you have questions or are unsure.    You can get addicted to pain medicines, especially if you have a history of addiction (chemical, alcohol or substance dependence). Talk to your care team about ways to reduce this risk.    All opioids tend to cause constipation. Drink plenty of water and eat foods that have a lot of fiber, such as fruits, vegetables, prune juice, apple juice and high-fiber cereal. Take a laxative (Miralax, milk of magnesia, Colace, Senna) if you don t move your bowels at least every other day. Other side effects include upset stomach, sleepiness, dizziness, throwing up, tolerance (needing more of the medicine to have the same effect), physical dependence and slowed breathing.    Store your pills in a secure place, locked if possible. We will not replace any lost or stolen medicine. If you don t finish your  medicine, please throw away (dispose) as directed by your pharmacist. The [place] [employer]ution [employer] has more information about safe disposal: https://www.pca.state.[place]/living-green/managing-unwanted-medications        Prescriptions were sent or printed at these locations (1 Prescription)                   Other Prescriptions                Printed at Department/Unit printer (1 of 1)         HYDROcodone-acetaminophen (NORCO) 5-325 MG per tablet                Procedures and tests performed during your visit     Elbow XR, 2 views, right      Orders Needing Specimen Collection     None      Pending Results     Date and Time Order Name Status Description    8/8/2018 1852 Elbow XR, 2 views, right Preliminary             Pending Culture Results     No orders found from 8/6/2018 to 8/9/2018.            Pending Results Instructions     If you had any lab results that were not finalized at the time of your Discharge, you can call the ED Lab Result RN at [phone]. You will be contacted by this team for any positive Lab results or changes in treatment. The nurses are available 7 days a week from 10A to 6:30P.  You can leave a message 24 hours per day and they will return your call.        Test Results From Your Hospital Stay        8/8/2018  8:12 PM      Narrative     ELBOW RIGHT TWO VIEWS  8/8/2018 7:07 PM     HISTORY: Right elbow pain persistent and radiating pain to right  shoulder.     COMPARISON: None.        Impression     IMPRESSION: No bony or soft tissue abnormality. No joint space  effusion.                Thank you for choosing [facility]       Thank you for choosing [facility] for your care. Our goal is always to provide you with excellent care. Hearing back from our patients is one way we can continue to improve our services. Please take a few minutes to complete the written survey that you may receive in the mail after you visit with us. Thank you!        MyChart Information     [email or web address] gives you  secure access to your electronic health record. If you see a primary care provider, you can also send messages to your care team and make appointments. If you have questions, please call your primary care clinic.  If you do not have a primary care provider, please call [phone] and they will assist you.        Care EveryWhere ID     This is your Care EveryWhere ID. This could be used by other organizations to access your [facility] medical records  [id number]        Equal Access to Services     ANTONINA ALBA : Drew warnero Sovalentin, wamichael lujulia, qamoota kaalmajasper reza, mookie rich. [facility] [phone].    ATENCIÓN: Si habla español, tiene a marshall disposición servicios gratuitos de asistencia lingüística. Llame al [phone].    We comply with applicable federal civil rights laws and [place] laws. We do not discriminate on the basis of race, color, national origin, age, disability, sex, sexual orientation, or gender identity.            After Visit Summary       This is your record. Keep this with you and show to your community pharmacist(s) and doctor(s) at your next visit.

## 2018-08-08 NOTE — LETTER
August 8, 2018      To Whom It May Concern:      Santo Toure was seen in our Emergency Department today, 08/08/18.  I expect his condition to improve over the next 7 days.  He may return to work/school when improved.    Sincerely,        ROSSY Dueñas CNP

## 2018-08-09 ENCOUNTER — MYC MEDICAL ADVICE (OUTPATIENT)
Dept: FAMILY MEDICINE | Facility: CLINIC | Age: 38
End: 2018-08-09

## 2018-08-09 NOTE — DISCHARGE INSTRUCTIONS
I want you to take ibuprofen 600 mg +2 extra strength Tylenol every 6-8 hours as needed for pain.  If you are still having severe pain substitute 1 hydrocodone in place of 1 or 2 of the extra strength Tylenol.  When taking hydrocodone this is a narcotic and you should not operate heavy equipment or drive or work.  It may cause drowsiness dizziness or upset stomach.  It might also cause constipation.  You should take a stool softener when taking any narcotic.  I want you to take time off of work and rest your arm as your work may be aggravating your elbow and shoulder pain.  I also want you to follow-up with orthopedics prior to returning to work.  You also need to establish care with a primary care provider.    Arthralgia    Arthralgia is the term for pain in or around the joint. It is a symptom, not a disease. This pain may involve one or more joints. In some cases, the pain moves from joint to joint.  There are many causes for joint pain. These include:    Injury    Osteoarthritis (wearing out of the joint surface)    Gout (inflammation of the joint due to crystals in the joint fluid)    Infection inside the joint      Bursitis (inflammation of the fluid-filled sacs around the joint)    Autoimmune disorders such as rheumatoid arthritis or lupus    Tendonitis (inflammation of chords that attach muscle to bone)  Home care    Rest the involved joint(s) until your symptoms improve.     You may be prescribed pain medicine. If none is prescribed, you may use acetaminophen or ibuprofen to control pain and inflammation.  Follow-up care  Follow up with your healthcare provider or as advised.  When to seek medical advice  Contact your healthcare provider right away if any of the following occurs:    Pain, swelling, or redness of joint increases    Pain worsens or recurs after a period of improvement    Pain moves to other joints    You cannot bear weight on the affected joint     You cannot move the affected  joint    Joint appears deformed    New rash appears    Fever of 100.4 F (38 C) or higher, or as directed by your healthcare provider  Date Last Reviewed: 3/1/2017    5826-8652 [employer]. [address] [place] [address], [place] [address]. All rights reserved. This information is not intended as a substitute for professional medical care. Always follow your healthcare professional's instructions.

## 2018-08-10 ENCOUNTER — OFFICE VISIT (OUTPATIENT)
Dept: FAMILY MEDICINE | Facility: CLINIC | Age: 38
End: 2018-08-10
Payer: COMMERCIAL

## 2018-08-10 VITALS
WEIGHT: 242 LBS | TEMPERATURE: 96.8 F | BODY MASS INDEX: 32.82 KG/M2 | SYSTOLIC BLOOD PRESSURE: 124 MMHG | HEART RATE: 70 BPM | DIASTOLIC BLOOD PRESSURE: 82 MMHG

## 2018-08-10 DIAGNOSIS — M70.22 OLECRANON BURSITIS OF LEFT ELBOW: Primary | ICD-10-CM

## 2018-08-10 PROCEDURE — 99213 OFFICE O/P EST LOW 20 MIN: CPT | Performed by: NURSE PRACTITIONER

## 2018-08-10 ASSESSMENT — PAIN SCALES - GENERAL: PAINLEVEL: EXTREME PAIN (9)

## 2018-08-10 NOTE — PROGRESS NOTES
SUBJECTIVE:   Santo Toure is a 38 year old male who presents to clinic today for the following health issues:    Forms-Short term disability      Joint or Musculoskeletal Pain  Duration of complaint: Elbow pain     Description:   Location: right elbow  Character: Sharp  Intensity: moderate  Progression of Symptoms: better  Accompanying Signs & Symptoms: Other symptoms: none  History: Previous similar pain: YES    Precipitating factors: Trauma or overuse: YES  Alleviating factors: Improved by: nothing  Therapies Tried and outcome: See in the Emergency Department         Problem list and histories reviewed & adjusted, as indicated.  Additional history: as documented    Patient Active Problem List   Diagnosis     Kidney stone     Pyelonephritis     Complicated UTI (urinary tract infection)     Past Surgical History:   Procedure Laterality Date     CYSTOSCOPY, RETROGRADES, INSERT STENT URETER(S), COMBINED Right 9/14/2017    Procedure: COMBINED CYSTOSCOPY, RETROGRADES, INSERT STENT URETER(S);  Cysto , right stent placement;  Surgeon: Pavel Lundy MD;  Location: [facility] OR     LASER HOLMIUM LITHOTRIPSY URETER(S), INSERT STENT, COMBINED Right 10/5/2017    Procedure: COMBINED CYSTOSCOPY, URETEROSCOPY, LASER HOLMIUM LITHOTRIPSY URETER(S), INSERT STENT;  Cystoscopy, right ureteroscopy, laser lithotripsy, stent exchange;  Surgeon: Pavel Lundy MD;  Location: [facility] OR       Social History   Substance Use Topics     Smoking status: Never Smoker     Smokeless tobacco: Never Used     Alcohol use Yes      Comment: on occasion     Family History   Problem Relation Age of Onset     HEART DISEASE Mother      MI, also frequent kidney infections         Current Outpatient Prescriptions   Medication Sig Dispense Refill     acetaminophen (TYLENOL) 500 MG tablet Take 2 tablets (1,000 mg) by mouth every 6 hours as needed for mild pain or fever 1 Bottle 0     IBUPROFEN PO Take 400-600 mg by mouth every 6 hours as  needed for moderate pain       order for DME Elbow brace 1 Units 0     cyclobenzaprine (FLEXERIL) 10 MG tablet Take 0.5-1 tablets (5-10 mg) by mouth 3 times daily as needed for muscle spasms (Patient not taking: Reported on 8/10/2018) 30 tablet 1     HYDROcodone-acetaminophen (NORCO) 5-325 MG per tablet Take 1 tablet by mouth every 4 hours as needed for pain (Patient not taking: Reported on 8/10/2018) 18 tablet 0     ketorolac (TORADOL) 10 MG tablet Take 1 tablet (10 mg) by mouth every 6 hours as needed for moderate pain (Patient not taking: Reported on 8/10/2018) 20 tablet 0     Allergies   Allergen Reactions     Percocet [Oxycodone-Acetaminophen] Nausea and Vomiting     Penicillins Other (See Comments) and Rash     Vomiting as a infant       Reviewed and updated as needed this visit by clinical staff       Reviewed and updated as needed this visit by Provider         ROS:  Constitutional, HEENT, cardiovascular, pulmonary, gi and gu systems are negative, except as otherwise noted.    OBJECTIVE:     /82 (BP Location: Right arm, Patient Position: Sitting, Cuff Size: Adult Large)  Pulse 70  Temp 96.8  F (36  C) (Tympanic)  Wt 242 lb (109.8 kg)  BMI 32.82 kg/m2  Body mass index is 32.82 kg/(m^2).   GENERAL: healthy, alert and no distress  EYES: Eyes grossly normal to inspection, PERRL and conjunctivae and sclerae normal  HENT: ear canals and TM's normal, nose and mouth without ulcers or lesions  NECK: no adenopathy, no asymmetry, masses, or scars and thyroid normal to palpation  RESP: lungs clear to auscultation - no rales, rhonchi or wheezes  CV: regular rate and rhythm, normal S1 S2, no S3 or S4, no murmur, click or rub, no peripheral edema and peripheral pulses strong  MS: no gross musculoskeletal defects noted, no edema  SKIN: no suspicious lesions or rashes  NEURO: Normal strength and tone, mentation intact and speech normal  PSYCH: mentation appears normal, affect normal/bright    Inspection: no  swelling, no ecchymosis, no olecranon bursa swelling  Tender: extensor muscle of forearm, flexor muscle of forearm and olecranon bursa  Non-tender: lateral epicondyle, common extensor tendon, medial epicondyle, distal bicep tendon and radial head/neck  Range of Motion: all normal  Strength: elbow strength full  Special tests: normal stability, normal valgus stress:     ELBOW RIGHT TWO VIEWS  8/8/2018 7:07 PM      HISTORY: Right elbow pain persistent and radiating pain to right  shoulder.      COMPARISON: None.         IMPRESSION: No bony or soft tissue abnormality. No joint space  effusion.     BINA DE LA CRUZ MD    ASSESSMENT:       ICD-10-CM    1. Olecranon bursitis of left elbow M70.22 order for DME         PLAN:   Patient has patient bursitis of the left elbow will be seen by orthopedics needs at work papers filled out until he can be seen by orthopedics.  Elbow brace provided today.       ROSSY [facility]

## 2018-08-10 NOTE — MR AVS SNAPSHOT
After Visit Summary   8/10/2018    Santo Toure    MRN: [id number]           Patient Information     Date Of Birth          1980        Visit Information        Provider Department      8/10/2018 10:00 AM Ina May APRN CNP; [facility]        Today's Diagnoses     Olecranon bursitis of left elbow    -  1      Care Instructions    Follow-up with orthopaedics           Bursitis of the Elbow (Olecranon)  Your elbow joint contains a small fluid-filled sac called a bursa. The bursa helps the muscles and tendons move smoothly over the bone. It also cushions and protects your elbow. Bursitis is when the bursa is inflamed or swollen. This is most often due to overuse of or injury to the elbow. Symptoms include swelling and pain. If the elbow is red and feels warm to the touch, the bursa itself may be infected.  In most cases, elbow bursitis resolves with medicine and self-care at home. It may take several weeks for the bursa to heal and the swelling to go away. In some cases, your healthcare provider may drain excess fluid from the bursa. Or, he or she may inject medicine directly into the bursa to help relieve symptoms. In severe cases, you may need surgery to remove the bursa may. If there is concern that the bursa is infected, your healthcare provider may prescribe antibiotics to treat the infection.    Home care  Your healthcare provider may prescribe medicine to help relieve pain and swelling. This may be an over-the-counter pain reliever or prescription pain medicine. Take all medicines as directed. To help treat or prevent infection, your provider may prescribe antibiotics. If these are prescribed, take them as directed until they are gone.  The following are general care guidelines:    Apply an ice pack or bag of frozen peas wrapped in a thin towel to your elbow for 15 to 20 minutes at a time. Do this 3 to 4 times a day until pain and swelling improve.    Keep  your elbow raised above the level of your heart whenever possible. This helps reduce swelling. When sitting or lying down, place your arm on a pillow that rests on your chest or on a pillow at your side.    Use an elastic wrap around the elbow joint to compress the area while it is healing. Make the wrap snug but not tight to the point of causing pain.    Rest your elbow to give it time to heal. You may need to wear an elbow pad to help protect and limit the movement of your elbow. During and after healing, avoid leaning on your elbows.  Follow-up care  Follow up with your healthcare provider, or as advised. If you have been referred to a specialist, make that appointment promptly.  When to seek medical advice  Call your healthcare provider right away if any of these occur:    Fever of 100.4 F (38 C) or higher, or as advised    Chills    Increased pain, swelling, warmth, redness, or drainage from the joint    Trouble moving the elbow joint    Numbness or tingling in the hand    Severe pain or swelling in forearm or hand    Loss of pink color and slow return of color after squeezing fingertip or hand  Date Last Reviewed: 6/1/2016 2000-2017 [employer]. [address] [place] [address]. All rights reserved. This information is not intended as a substitute for professional medical care. Always follow your healthcare professional's instructions.                Follow-ups after your visit        Your next 10 appointments already scheduled     Aug 13, 2018 11:00 AM CDT   New Visit with Krunal Rose DO   [facility] [place] ([facility] [place])    [address] [address]  [place] [address] [phone]              Who to contact     If you have questions or need follow up information about today's clinic visit or your schedule please contact [facility] directly at [phone].  Normal or non-critical lab and imaging  results will be communicated to you by [employer], letter or phone within 4 business days after the clinic has received the results. If you do not hear from us within 7 days, please contact the clinic through [employer] or phone. If you have a critical or abnormal lab result, we will notify you by phone as soon as possible.  Submit refill requests through [employer] or call your pharmacy and they will forward the refill request to us. Please allow 3 business days for your refill to be completed.          Additional Information About Your Visit        [employer] Information     [employer] gives you secure access to your electronic health record. If you see a primary care provider, you can also send messages to your care team and make appointments. If you have questions, please call your primary care clinic.  If you do not have a primary care provider, please call [phone] and they will assist you.        Care EveryWhere ID     This is your Care EveryWhere ID. This could be used by other organizations to access your [facility] medical records  [id number]        Your Vitals Were     Pulse Temperature BMI (Body Mass Index)             70 96.8  F (36  C) (Tympanic) 32.82 kg/m2          Blood Pressure from Last 3 Encounters:   08/10/18 124/82   08/08/18 137/83   08/04/18 142/87    Weight from Last 3 Encounters:   08/10/18 242 lb (109.8 kg)   08/04/18 230 lb (104.3 kg)   02/20/18 235 lb (106.6 kg)              Today, you had the following     No orders found for display         Today's Medication Changes          These changes are accurate as of 8/10/18 10:31 AM.  If you have any questions, ask your nurse or doctor.               Start taking these medicines.        Dose/Directions    order for DME   Used for:  Olecranon bursitis of left elbow   Started by:  Ina May APRN CNP        Elbow brace   Quantity:  1 Units   Refills:  0            Where to get your medicines      Some of these will need a paper prescription and  others can be bought over the counter.  Ask your nurse if you have questions.     Bring a paper prescription for each of these medications     order for DME                Primary Care Provider Office Phone # Fax #    [facility] [phone] [address] [place] [address]        Equal Access to Services     ANTONINA ALBA : Hadii aad ku hadasho Soomaali, waaxda luqadaha, qaybta kaalmada adeegyada, omokie colinanamikasushila rich. So [facility] [phone].    ATENCIÓN: Si habla español, tiene a marshall disposición servicios gratuitos de asistencia lingüística. Sarahi al [phone].    We comply with applicable federal civil rights laws and [place] laws. We do not discriminate on the basis of race, color, national origin, age, disability, sex, sexual orientation, or gender identity.            Thank you!     Thank you for choosing [facility]  for your care. Our goal is always to provide you with excellent care. Hearing back from our patients is one way we can continue to improve our services. Please take a few minutes to complete the written survey that you may receive in the mail after your visit with us. Thank you!             Your Updated Medication List - Protect others around you: Learn how to safely use, store and throw away your medicines at www.disposemymeds.org.          This list is accurate as of 8/10/18 10:31 AM.  Always use your most recent med list.                   Brand Name Dispense Instructions for use Diagnosis    acetaminophen 500 MG tablet    TYLENOL    1 Bottle    Take 2 tablets (1,000 mg) by mouth every 6 hours as needed for mild pain or fever    Calculi, ureter       cyclobenzaprine 10 MG tablet    FLEXERIL    30 tablet    Take 0.5-1 tablets (5-10 mg) by mouth 3 times daily as needed for muscle spasms    Temporomandibular joint disorder       HYDROcodone-acetaminophen 5-325 MG per tablet    NORCO    18 tablet    Take 1 tablet  by mouth every 4 hours as needed for pain    Right elbow pain       IBUPROFEN PO      Take 400-600 mg by mouth every 6 hours as needed for moderate pain        ketorolac 10 MG tablet    TORADOL    20 tablet    Take 1 tablet (10 mg) by mouth every 6 hours as needed for moderate pain    Temporomandibular joint disorder       order for DME     1 Units    Elbow brace    Olecranon bursitis of left elbow

## 2018-08-10 NOTE — PATIENT INSTRUCTIONS
Follow-up with orthopaedics           Bursitis of the Elbow (Olecranon)  Your elbow joint contains a small fluid-filled sac called a bursa. The bursa helps the muscles and tendons move smoothly over the bone. It also cushions and protects your elbow. Bursitis is when the bursa is inflamed or swollen. This is most often due to overuse of or injury to the elbow. Symptoms include swelling and pain. If the elbow is red and feels warm to the touch, the bursa itself may be infected.  In most cases, elbow bursitis resolves with medicine and self-care at home. It may take several weeks for the bursa to heal and the swelling to go away. In some cases, your healthcare provider may drain excess fluid from the bursa. Or, he or she may inject medicine directly into the bursa to help relieve symptoms. In severe cases, you may need surgery to remove the bursa may. If there is concern that the bursa is infected, your healthcare provider may prescribe antibiotics to treat the infection.    Home care  Your healthcare provider may prescribe medicine to help relieve pain and swelling. This may be an over-the-counter pain reliever or prescription pain medicine. Take all medicines as directed. To help treat or prevent infection, your provider may prescribe antibiotics. If these are prescribed, take them as directed until they are gone.  The following are general care guidelines:    Apply an ice pack or bag of frozen peas wrapped in a thin towel to your elbow for 15 to 20 minutes at a time. Do this 3 to 4 times a day until pain and swelling improve.    Keep your elbow raised above the level of your heart whenever possible. This helps reduce swelling. When sitting or lying down, place your arm on a pillow that rests on your chest or on a pillow at your side.    Use an elastic wrap around the elbow joint to compress the area while it is healing. Make the wrap snug but not tight to the point of causing pain.    Rest your elbow to give it  time to heal. You may need to wear an elbow pad to help protect and limit the movement of your elbow. During and after healing, avoid leaning on your elbows.  Follow-up care  Follow up with your healthcare provider, or as advised. If you have been referred to a specialist, make that appointment promptly.  When to seek medical advice  Call your healthcare provider right away if any of these occur:    Fever of 100.4 F (38 C) or higher, or as advised    Chills    Increased pain, swelling, warmth, redness, or drainage from the joint    Trouble moving the elbow joint    Numbness or tingling in the hand    Severe pain or swelling in forearm or hand    Loss of pink color and slow return of color after squeezing fingertip or hand  Date Last Reviewed: 6/1/2016 2000-2017 [employer]. [address] [place] [address], [place] [address]. All rights reserved. This information is not intended as a substitute for professional medical care. Always follow your healthcare professional's instructions.

## 2018-08-10 NOTE — NURSING NOTE
Chief Complaint   Patient presents with     Forms     Short term disability- Due to injury        Initial /82 (BP Location: Right arm, Patient Position: Sitting, Cuff Size: Adult Large)  Pulse 70  Temp 96.8  F (36  C) (Tympanic)  Wt 242 lb (109.8 kg)  BMI 32.82 kg/m2 Estimated body mass index is 32.82 kg/(m^2) as calculated from the following:    Height as of 8/8/18: 6' (1.829 m).    Weight as of this encounter: 242 lb (109.8 kg).      Health Maintenance that is potentially due pending provider review:  NONE    n/a    Is there anyone who you would like to be able to receive your results? No  If yes have patient fill out HENRRY VELASQUEZ CMA

## 2018-08-13 ENCOUNTER — OFFICE VISIT (OUTPATIENT)
Dept: ORTHOPEDICS | Facility: CLINIC | Age: 38
End: 2018-08-13
Attending: NURSE PRACTITIONER
Payer: COMMERCIAL

## 2018-08-13 VITALS
SYSTOLIC BLOOD PRESSURE: 108 MMHG | WEIGHT: 242 LBS | BODY MASS INDEX: 32.07 KG/M2 | DIASTOLIC BLOOD PRESSURE: 72 MMHG | HEIGHT: 73 IN

## 2018-08-13 DIAGNOSIS — M79.601 RIGHT ARM PAIN: Primary | ICD-10-CM

## 2018-08-13 PROCEDURE — 99244 OFF/OP CNSLTJ NEW/EST MOD 40: CPT | Performed by: PEDIATRICS

## 2018-08-13 RX ORDER — PREDNISONE 20 MG/1
20 TABLET ORAL 2 TIMES DAILY
Qty: 10 TABLET | Refills: 0 | Status: SHIPPED | OUTPATIENT
Start: 2018-08-13 | End: 2020-03-10

## 2018-08-13 ASSESSMENT — PAIN SCALES - GENERAL: PAINLEVEL: EXTREME PAIN (8)

## 2018-08-13 NOTE — LETTER
8/13/2018         RE: Santo Toure  [address] [facility] [address]        Dear Colleague,    Thank you for referring your patient, Santo Toure, to the [facility]. Please see a copy of my visit note below.    Sports Medicine Clinic Visit    PCP: Clinic, [facility]    Santo Toure is a 38 year old male who is seen in consultation as referral from Corrine Kee with right elbow pain.  Right hand dominant.    Injury: None, possible start from IV a few weeks ago, Pt states he feels that he slept on it wrong. Patient states that pain did not start until 2 days after IV. Patient states that when he woke with pain, he woke with arm behind back.    No neck pain. No prior neck issues.  Tingling sensation, and when pain comes is more throbbing.  Sensation change more in ulnar hand and ulnar 2 digits, some in dorsal radial hand.    Location of Pain: right antecubital area radiating to shoulder and hand.  Duration of Pain: 2 week(s)  Rating of Pain at worst: 10/10  Rating of Pain Currently: 8/10  Symptoms are better with: None  Symptoms are worse with: Making a fist, any use of arm, lifting items, reaching  Additional Features:   Positive: swelling and paresthesias   Negative: bruising, popping, grinding, catching, locking, instability, numbness, weakness, pain in other joints and systemic symptoms  Other evaluation and/or treatments so far consists of: Ice, heat, bracing, Tylenol with  IBU. Xray and ultrasound.   Prior History of related problems: Pinched nerve in elbow in the past, pain only lasted one day.    Social History: , has not been to work since 8/4/18.    Review of Systems  Musculoskeletal: as above  Remainder of review of systems is negative including constitutional, CV, pulmonary, GI, Skin and Neurologic except as noted in HPI or medical history.    Past Medical History:   Diagnosis Date     Asthma     seasonal, spring and fall      "Deaf      Kidney stone 09/14/2017    had R side stent placed and then on 10/5/2017 had lithitripsy     PONV (postoperative nausea and vomiting)      Past Surgical History:   Procedure Laterality Date     CYSTOSCOPY, RETROGRADES, INSERT STENT URETER(S), COMBINED Right 9/14/2017    Procedure: COMBINED CYSTOSCOPY, RETROGRADES, INSERT STENT URETER(S);  Cysto , right stent placement;  Surgeon: Pavel Lundy MD;  Location: [facility]     LASER HOLMIUM LITHOTRIPSY URETER(S), INSERT STENT, COMBINED Right 10/5/2017    Procedure: COMBINED CYSTOSCOPY, URETEROSCOPY, LASER HOLMIUM LITHOTRIPSY URETER(S), INSERT STENT;  Cystoscopy, right ureteroscopy, laser lithotripsy, stent exchange;  Surgeon: Pavel Lundy MD;  Location: [facility] OR     Family History   Problem Relation Age of Onset     HEART DISEASE Mother      MI, also frequent kidney infections     Social History     Social History     Marital status: Single     Spouse name: N/A     Number of children: N/A     Years of education: N/A     Occupational History     Not on file.     Social History Main Topics     Smoking status: Never Smoker     Smokeless tobacco: Never Used     Alcohol use Yes      Comment: on occasion     Drug use: No     Sexual activity: Not on file     Other Topics Concern     Not on file     Social History Narrative    , works as        Objective  /72  Ht 6' 1\" (1.854 m)  Wt 242 lb (109.8 kg)  BMI 31.93 kg/m2    GENERAL APPEARANCE: healthy, alert and no distress   GAIT: NORMAL  SKIN: no suspicious lesions or rashes  NEURO: Normal strength and tone and mentation intact  PSYCH:  mentation appears normal and affect normal/bright  HEENT: no scleral icterus  CV: no extremity edema  RESP: nonlabored breathing      Cervical spine exam:  Range of Motion: forward flexion , extension , lateral rotation , lateral bending grossly full; min right base of neck pain with rightward bending   Inspection: No visible deformity.  Normal " lordotic curvature is maintained.  Palpation: Midline nontender.  Paraspinal muscles nontender.  Upper border of trapezius nontender.  Strength: C4 (shoulder shrug) symmetric     C5 (shoulder abduction) symmetric     C6 (elbow flexion) symmetric     C7 (elbow extension) symmetric     C8 (finger abduction, thumb flexion) symmetric   Reflexes: C5 (biceps) symmetric 2+    C6 (supinator) symmetric 2+    C7 (triceps) symmetric 2+  Inciting exams:  Spurling negative    Thoracic outlet (Adson's, costoclavicular, hyperabduction maneuver) no change in symptoms  Skin: well perfused, capillary refill brisk.  Lymphatics: No edema noted in the upper extremities.        Shoulder exam:  Right   Range of Motion: Forward flexion:       Abduction:       Internal rotation:      External rotation:    Grossly full; mild discomfort end range flexion, abduction (at elbow)  Inspection:  No visible deformity noted.  Palpation:   Sternoclavicular joint nontender     Acromioclavicular joint nontender     Subacromial space nontender     Posterior shoulder and periscapular region nontender   Strength: Abduction (arm at side)     Internal rotation (arm at side)     External rotation (arm at side)     Grossly full, symmetric  Skin: No visible abnormalities  Lymphatics: No edema      Right Elbow exam:    Inspection:       no ecchymosis       no edema or effusion    ROM:       full with flexion, extension, forearm supination and pronation.  Pain anterior elbow with full extension    Strength:       flexion 5/5       extension 5/5       forearm supination 5/5       forearm pronation 5/5  Pain with resisted flexion, supination    Tender:       antecubital space--distal biceps, and tendon       Cubital tunnel  Proximal to lateral epicondyle    Sensation:       intact throughout elbow, forearm light touch    Skin:       well perfused       capillary refill less than 2 seconds    Tinel positive cubital tunnel--radiates symptoms to forearm, ulnar  hand        Hand/wrist (right):    Inspection:  No deformity noted.  No swelling. No ecchymosis.    Motion:  Forearm pronation , forearm supination .  Wrist flexion   Wrist extension   Wrist radial deviation   Wrist ulnar deviation   Digit motion   Grossly full, no change in symptoms    Strength:  , intrinsic grossly symmetric    Sensation:  Decreased light touch ulnar 2 digits    Radial pulses normal, +2/4, capillary refill brisk.    Palpation:  Tender volar wrist, distal volar and dorsal forearm    Tinel negative volar wrist. Phalen no change.          Radiology:  Visualized radiographs of right elbow 8/8/18, and reviewed the images with the patient.  Impression: no acute osseous abnormality.  Reports reviewed:    US Upper Extremity Venous Duplex Right    Narrative    US UPPER EXTREMITY VENOUS DUPLEX RIGHT   8/4/2018 11:08 PM     HISTORY: Pain; antecubital soft tissue swelling after placement of IV  in right forearm 6 days ago.     COMPARISON: None.    FINDINGS: Gray-scale, color and Doppler spectral analysis ultrasound  was performed of the right arm. Compression and augmentation imaging  was performed.    There is no evidence for deep venous thrombosis. The veins compress  and augment normally.      Impression    IMPRESSION: No deep venous thrombosis.    NY LOMBARDI MD   Elbow XR, 2 views, right    Narrative    ELBOW RIGHT TWO VIEWS  8/8/2018 7:07 PM     HISTORY: Right elbow pain persistent and radiating pain to right  shoulder.     COMPARISON: None.      Impression    IMPRESSION: No bony or soft tissue abnormality. No joint space  effusion.    BINA DE LA CRUZ MD         Assessment:  1. Right arm pain        Plan:  Discussed the assessment with the patient through . I think this is more musculoskeletal, with pain more focal at the biceps; this fits with his occupation. Additional consideration is peripheral neuropathy such as ulnar nerve at elbow; strong consideration for this, but others  include radial nerve, less likely median at wrist. Does not appear cervical source. Also not clearly related to his prior IV placement.  We discussed the following: symptom treatment, activity modification/rest, imaging, rehab, medication, potential for improvement with time, support for the affected area and electrodiagnostic testing. Following discussion, plan:  Topical Treatments: Ice, Heat or Topical Analgesics for comfort  Over the counter medication: Patient's preferred OTC medication prn  Prescription Medication as directed: Prednisone   We discussed potential benefit of oral steroid, related to inflammatory condition, nerve compression. He has tolerated prednisone in past, desired to try for this. rx placed.   Pertinent potential adverse effect profile of prescribed medication(s) was discussed with the patient, who expressed understanding.  Plain films of the elbow reviewed. Also had right UE US as noted above. No clear indication for additional imaging currently.  Activity Modification: discussed  Work Restrictions letter provided; off work for now. We discussed potential for light duty as well. Also completed form related to work.  Rehab: Home Exercise Program for stretching reviewed.  Medical Equipment: discussed elbow support; may use compression prn. Has a wrap around support. Tried spider pad, but too tight.  Discussed consideration of Edx testing. Given duration of symptoms ~2 weeks, hold for now. If persistent, reconsider this.  Follow up: or call/MyChart 1 week if not improving with above, sooner prn. May have pt f/u in clinic if not improving.  Questions answered. The patient indicates understanding of these issues and agrees with the plan.    Krunal Rose, DO, CAQ    CC: Corrine Kee          Disclaimer: This note consists of symbols derived from keyboarding, dictation and/or voice recognition software. As a result, there may be errors in the script that have gone undetected. Please  consider this when interpreting information found in this chart.        Again, thank you for allowing me to participate in the care of your patient.        Sincerely,        Krunal Rose, DO

## 2018-08-13 NOTE — NURSING NOTE
"Initial /72  Ht 6' 1\" (1.854 m)  Wt 242 lb (109.8 kg)  BMI 31.93 kg/m2 Estimated body mass index is 31.93 kg/(m^2) as calculated from the following:    Height as of this encounter: 6' 1\" (1.854 m).    Weight as of this encounter: 242 lb (109.8 kg). .      "

## 2018-08-13 NOTE — PROGRESS NOTES
Sports Medicine Clinic Visit    PCP: Clinic, [facility]    Santo Toure is a 38 year old male who is seen in consultation as referral from Corrine Kee with right elbow pain.  Right hand dominant.    Injury: None, possible start from IV a few weeks ago, Pt states he feels that he slept on it wrong. Patient states that pain did not start until 2 days after IV. Patient states that when he woke with pain, he woke with arm behind back.    No neck pain. No prior neck issues.  Tingling sensation, and when pain comes is more throbbing.  Sensation change more in ulnar hand and ulnar 2 digits, some in dorsal radial hand.    Location of Pain: right antecubital area radiating to shoulder and hand.  Duration of Pain: 2 week(s)  Rating of Pain at worst: 10/10  Rating of Pain Currently: 8/10  Symptoms are better with: None  Symptoms are worse with: Making a fist, any use of arm, lifting items, reaching  Additional Features:   Positive: swelling and paresthesias   Negative: bruising, popping, grinding, catching, locking, instability, numbness, weakness, pain in other joints and systemic symptoms  Other evaluation and/or treatments so far consists of: Ice, heat, bracing, Tylenol with  IBU. Xray and ultrasound.   Prior History of related problems: Pinched nerve in elbow in the past, pain only lasted one day.    Social History: , has not been to work since 8/4/18.    Review of Systems  Musculoskeletal: as above  Remainder of review of systems is negative including constitutional, CV, pulmonary, GI, Skin and Neurologic except as noted in HPI or medical history.    Past Medical History:   Diagnosis Date     Asthma     seasonal, spring and fall     Deaf      Kidney stone 09/14/2017    had R side stent placed and then on 10/5/2017 had lithitripsy     PONV (postoperative nausea and vomiting)      Past Surgical History:   Procedure Laterality Date     CYSTOSCOPY, RETROGRADES, INSERT STENT URETER(S), COMBINED Right  "9/14/2017    Procedure: COMBINED CYSTOSCOPY, RETROGRADES, INSERT STENT URETER(S);  Cysto , right stent placement;  Surgeon: Pavel Lundy MD;  Location: [facility]     LASER HOLMIUM LITHOTRIPSY URETER(S), INSERT STENT, COMBINED Right 10/5/2017    Procedure: COMBINED CYSTOSCOPY, URETEROSCOPY, LASER HOLMIUM LITHOTRIPSY URETER(S), INSERT STENT;  Cystoscopy, right ureteroscopy, laser lithotripsy, stent exchange;  Surgeon: Pavel Lundy MD;  Location: [facility] OR     Family History   Problem Relation Age of Onset     HEART DISEASE Mother      MI, also frequent kidney infections     Social History     Social History     Marital status: Single     Spouse name: N/A     Number of children: N/A     Years of education: N/A     Occupational History     Not on file.     Social History Main Topics     Smoking status: Never Smoker     Smokeless tobacco: Never Used     Alcohol use Yes      Comment: on occasion     Drug use: No     Sexual activity: Not on file     Other Topics Concern     Not on file     Social History Narrative    , works as        Objective  /72  Ht 6' 1\" (1.854 m)  Wt 242 lb (109.8 kg)  BMI 31.93 kg/m2    GENERAL APPEARANCE: healthy, alert and no distress   GAIT: NORMAL  SKIN: no suspicious lesions or rashes  NEURO: Normal strength and tone and mentation intact  PSYCH:  mentation appears normal and affect normal/bright  HEENT: no scleral icterus  CV: no extremity edema  RESP: nonlabored breathing      Cervical spine exam:  Range of Motion: forward flexion , extension , lateral rotation , lateral bending grossly full; min right base of neck pain with rightward bending   Inspection: No visible deformity.  Normal lordotic curvature is maintained.  Palpation: Midline nontender.  Paraspinal muscles nontender.  Upper border of trapezius nontender.  Strength: C4 (shoulder shrug) symmetric     C5 (shoulder abduction) symmetric     C6 (elbow flexion) symmetric     C7 (elbow " extension) symmetric     C8 (finger abduction, thumb flexion) symmetric   Reflexes: C5 (biceps) symmetric 2+    C6 (supinator) symmetric 2+    C7 (triceps) symmetric 2+  Inciting exams:  Spurling negative    Thoracic outlet (Adson's, costoclavicular, hyperabduction maneuver) no change in symptoms  Skin: well perfused, capillary refill brisk.  Lymphatics: No edema noted in the upper extremities.        Shoulder exam:  Right   Range of Motion: Forward flexion:       Abduction:       Internal rotation:      External rotation:    Grossly full; mild discomfort end range flexion, abduction (at elbow)  Inspection:  No visible deformity noted.  Palpation:   Sternoclavicular joint nontender     Acromioclavicular joint nontender     Subacromial space nontender     Posterior shoulder and periscapular region nontender   Strength: Abduction (arm at side)     Internal rotation (arm at side)     External rotation (arm at side)     Grossly full, symmetric  Skin: No visible abnormalities  Lymphatics: No edema      Right Elbow exam:    Inspection:       no ecchymosis       no edema or effusion    ROM:       full with flexion, extension, forearm supination and pronation.  Pain anterior elbow with full extension    Strength:       flexion 5/5       extension 5/5       forearm supination 5/5       forearm pronation 5/5  Pain with resisted flexion, supination    Tender:       antecubital space--distal biceps, and tendon       Cubital tunnel  Proximal to lateral epicondyle    Sensation:       intact throughout elbow, forearm light touch    Skin:       well perfused       capillary refill less than 2 seconds    Tinel positive cubital tunnel--radiates symptoms to forearm, ulnar hand        Hand/wrist (right):    Inspection:  No deformity noted.  No swelling. No ecchymosis.    Motion:  Forearm pronation , forearm supination .  Wrist flexion   Wrist extension   Wrist radial deviation   Wrist ulnar deviation   Digit motion   Grossly full, no  change in symptoms    Strength:  , intrinsic grossly symmetric    Sensation:  Decreased light touch ulnar 2 digits    Radial pulses normal, +2/4, capillary refill brisk.    Palpation:  Tender volar wrist, distal volar and dorsal forearm    Tinel negative volar wrist. Phalen no change.          Radiology:  Visualized radiographs of right elbow 8/8/18, and reviewed the images with the patient.  Impression: no acute osseous abnormality.  Reports reviewed:    US Upper Extremity Venous Duplex Right    Narrative    US UPPER EXTREMITY VENOUS DUPLEX RIGHT   8/4/2018 11:08 PM     HISTORY: Pain; antecubital soft tissue swelling after placement of IV  in right forearm 6 days ago.     COMPARISON: None.    FINDINGS: Gray-scale, color and Doppler spectral analysis ultrasound  was performed of the right arm. Compression and augmentation imaging  was performed.    There is no evidence for deep venous thrombosis. The veins compress  and augment normally.      Impression    IMPRESSION: No deep venous thrombosis.    NY LOMBARDI MD   Elbow XR, 2 views, right    Narrative    ELBOW RIGHT TWO VIEWS  8/8/2018 7:07 PM     HISTORY: Right elbow pain persistent and radiating pain to right  shoulder.     COMPARISON: None.      Impression    IMPRESSION: No bony or soft tissue abnormality. No joint space  effusion.    BINA DE LA CRUZ MD         Assessment:  1. Right arm pain        Plan:  Discussed the assessment with the patient through . I think this is more musculoskeletal, with pain more focal at the biceps; this fits with his occupation. Additional consideration is peripheral neuropathy such as ulnar nerve at elbow; strong consideration for this, but others include radial nerve, less likely median at wrist. Does not appear cervical source. Also not clearly related to his prior IV placement.  We discussed the following: symptom treatment, activity modification/rest, imaging, rehab, medication, potential for improvement  with time, support for the affected area and electrodiagnostic testing. Following discussion, plan:  Topical Treatments: Ice, Heat or Topical Analgesics for comfort  Over the counter medication: Patient's preferred OTC medication prn  Prescription Medication as directed: Prednisone   We discussed potential benefit of oral steroid, related to inflammatory condition, nerve compression. He has tolerated prednisone in past, desired to try for this. rx placed.   Pertinent potential adverse effect profile of prescribed medication(s) was discussed with the patient, who expressed understanding.  Plain films of the elbow reviewed. Also had right UE US as noted above. No clear indication for additional imaging currently.  Activity Modification: discussed  Work Restrictions letter provided; off work for now. We discussed potential for light duty as well. Also completed form related to work.  Rehab: Home Exercise Program for stretching reviewed.  Medical Equipment: discussed elbow support; may use compression prn. Has a wrap around support. Tried spider pad, but too tight.  Discussed consideration of Edx testing. Given duration of symptoms ~2 weeks, hold for now. If persistent, reconsider this.  Follow up: or call/MyChart 1 week if not improving with above, sooner prn. May have pt f/u in clinic if not improving.  Questions answered. The patient indicates understanding of these issues and agrees with the plan.    Krunal Rose, DO, CAQ    CC: Corrine Kee          Disclaimer: This note consists of symbols derived from keyboarding, dictation and/or voice recognition software. As a result, there may be errors in the script that have gone undetected. Please consider this when interpreting information found in this chart.

## 2018-08-13 NOTE — MR AVS SNAPSHOT
"              After Visit Summary   8/13/2018    Santo Toure    MRN: [id number]           Patient Information     Date Of Birth          1980        Visit Information        Provider Department      8/13/2018 11:00 AM Krunal Rose DO; [facility] [place]        Today's Diagnoses     Right arm pain    -  1       Follow-ups after your visit        Who to contact     If you have questions or need follow up information about today's clinic visit or your schedule please contact [facility] [place] directly at [phone].  Normal or non-critical lab and imaging results will be communicated to you by [employer], letter or phone within 4 business days after the clinic has received the results. If you do not hear from us within 7 days, please contact the clinic through [employer] or phone. If you have a critical or abnormal lab result, we will notify you by phone as soon as possible.  Submit refill requests through [employer] or call your pharmacy and they will forward the refill request to us. Please allow 3 business days for your refill to be completed.          Additional Information About Your Visit        MyChart Information     [employer] gives you secure access to your electronic health record. If you see a primary care provider, you can also send messages to your care team and make appointments. If you have questions, please call your primary care clinic.  If you do not have a primary care provider, please call [phone] and they will assist you.        Care EveryWhere ID     This is your Care EveryWhere ID. This could be used by other organizations to access your [facility] medical records  [id number]        Your Vitals Were     Height BMI (Body Mass Index)                6' 1\" (1.854 m) 31.93 kg/m2           Blood Pressure from Last 3 Encounters:   08/13/18 108/72   08/10/18 124/82   08/08/18 137/83    Weight from Last 3 Encounters:   08/13/18 242 lb " (109.8 kg)   08/10/18 242 lb (109.8 kg)   08/04/18 230 lb (104.3 kg)              Today, you had the following     No orders found for display         Today's Medication Changes          These changes are accurate as of 8/13/18 11:47 AM.  If you have any questions, ask your nurse or doctor.               Start taking these medicines.        Dose/Directions    predniSONE 20 MG tablet   Commonly known as:  DELTASONE   Used for:  Right arm pain   Started by:  Krunal Rose,         Dose:  20 mg   Take 1 tablet (20 mg) by mouth 2 times daily   Quantity:  10 tablet   Refills:  0            Where to get your medicines      These medications were sent to [facility] [place]  [facility] - [address] [address] [facility] [place]  [address] [facility] [place] [address]     Phone:  [phone]     predniSONE 20 MG tablet                Primary Care Provider Office Phone # Fax #    [facility] [phone] [address] [place] [address]        Equal Access to Services     [facility] AH: Hadii aad ku hadasho Soomaali, waaxda luqadaha, qaybta kaalmada adeegyada, waxay kareenin hayluis briggs . So [facility] [phone].    ATENCIÓN: Si habla español, tiene a marshall disposición servicios gratuitos de asistencia lingüística. [facility] [phone].    We comply with applicable federal civil rights laws and [place] laws. We do not discriminate on the basis of race, color, national origin, age, disability, sex, sexual orientation, or gender identity.            Thank you!     Thank you for choosing [facility] [place]  for your care. Our goal is always to provide you with excellent care. Hearing back from our patients is one way we can continue to improve our services. Please take a few minutes to complete the written survey that you may receive in the mail after your visit with us. Thank you!             Your Updated Medication List - Protect others around  you: Learn how to safely use, store and throw away your medicines at www.disposemymeds.org.          This list is accurate as of 8/13/18 11:47 AM.  Always use your most recent med list.                   Brand Name Dispense Instructions for use Diagnosis    acetaminophen 500 MG tablet    TYLENOL    1 Bottle    Take 2 tablets (1,000 mg) by mouth every 6 hours as needed for mild pain or fever    Calculi, ureter       cyclobenzaprine 10 MG tablet    FLEXERIL    30 tablet    Take 0.5-1 tablets (5-10 mg) by mouth 3 times daily as needed for muscle spasms    Temporomandibular joint disorder       HYDROcodone-acetaminophen 5-325 MG per tablet    NORCO    18 tablet    Take 1 tablet by mouth every 4 hours as needed for pain    Right elbow pain       IBUPROFEN PO      Take 400-600 mg by mouth every 6 hours as needed for moderate pain        ketorolac 10 MG tablet    TORADOL    20 tablet    Take 1 tablet (10 mg) by mouth every 6 hours as needed for moderate pain    Temporomandibular joint disorder       order for DME     1 Units    Elbow brace    Olecranon bursitis of left elbow       predniSONE 20 MG tablet    DELTASONE    10 tablet    Take 1 tablet (20 mg) by mouth 2 times daily    Right arm pain

## 2018-08-13 NOTE — LETTER
[facility] [place]  [address]  [place] [address]  Phone: [phone]  Fax: [phone]      August 13, 2018      RE: Santo Toure  [address] [address] [address]        To whom it may concern:    Santo Toure was seen in clinic today. The employee is UNABLE to return to work until Monday, 8/20/18.    When the patient returns to work, currently there are no anticipated work restrictions.          Sincerely,                Krunal QUINONES.

## 2018-12-20 ENCOUNTER — HOSPITAL ENCOUNTER (EMERGENCY)
Facility: CLINIC | Age: 38
Discharge: HOME | End: 2018-12-21
Attending: EMERGENCY MEDICINE | Admitting: EMERGENCY MEDICINE
Payer: COMMERCIAL

## 2018-12-20 VITALS
WEIGHT: 220 LBS | OXYGEN SATURATION: 97 % | SYSTOLIC BLOOD PRESSURE: 131 MMHG | DIASTOLIC BLOOD PRESSURE: 92 MMHG | HEIGHT: 72 IN | BODY MASS INDEX: 29.8 KG/M2 | RESPIRATION RATE: 18 BRPM

## 2018-12-20 DIAGNOSIS — H61.23 BILATERAL IMPACTED CERUMEN: ICD-10-CM

## 2018-12-20 DIAGNOSIS — K04.7 DENTAL INFECTION: ICD-10-CM

## 2018-12-20 DIAGNOSIS — R68.84 JAW PAIN: ICD-10-CM

## 2018-12-20 PROCEDURE — 99283 EMERGENCY DEPT VISIT LOW MDM: CPT | Performed by: EMERGENCY MEDICINE

## 2018-12-20 PROCEDURE — 99284 EMERGENCY DEPT VISIT MOD MDM: CPT | Mod: Z6 | Performed by: EMERGENCY MEDICINE

## 2018-12-20 ASSESSMENT — MIFFLIN-ST. JEOR: SCORE: 1955.91

## 2018-12-20 NOTE — ED AVS SNAPSHOT
[facility]  [address] [address]  Phone:  [phone]  Fax:  [phone]                                    Santo Toure   MRN: [id number]    Department:  [facility]   Date of Visit:  12/20/2018           After Visit Summary Signature Page    I have received my discharge instructions, and my questions have been answered. I have discussed any challenges I see with this plan with the nurse or doctor.    ..........................................................................................................................................  Patient/Patient Representative Signature      ..........................................................................................................................................  Patient Representative Print Name and Relationship to Patient    ..................................................               ................................................  Date                                   Time    ..........................................................................................................................................  Reviewed by Signature/Title    ...................................................              ..............................................  Date                                               Time          22EPIC Rev 08/18

## 2018-12-21 PROCEDURE — 25000132 ZZH RX MED GY IP 250 OP 250 PS 637: Performed by: EMERGENCY MEDICINE

## 2018-12-21 RX ORDER — CLINDAMYCIN HCL 150 MG
300 CAPSULE ORAL ONCE
Status: COMPLETED | OUTPATIENT
Start: 2018-12-21 | End: 2018-12-21

## 2018-12-21 RX ORDER — CLINDAMYCIN HCL 300 MG
300 CAPSULE ORAL 4 TIMES DAILY
Qty: 40 CAPSULE | Refills: 0 | Status: SHIPPED | OUTPATIENT
Start: 2018-12-21 | End: 2018-12-31

## 2018-12-21 RX ADMIN — CLINDAMYCIN HYDROCHLORIDE 300 MG: 150 CAPSULE ORAL at 00:38

## 2018-12-21 ASSESSMENT — ENCOUNTER SYMPTOMS
SHORTNESS OF BREATH: 0
ABDOMINAL PAIN: 0
FEVER: 0

## 2018-12-21 NOTE — ED PROVIDER NOTES
History     Chief Complaint   Patient presents with     Jaw Pain     Low jaw pain but upper pain as well. He has a bad tooth in the lowerjaw/ this started a week ago and is gradually increasing. No other pain     HPI   History and exam was accomplished with the use of video .    Santo Toure is a 38 year old male who is deaf, with video  utilized, presenting to the emergency department with approximately 1 week history of right-sided jaw pain.  Pain begins just inferior to the right ear, and extends to approximately the mid region of the lower jaw.  Denies any significant sore throat.  Denies earache.  No fever.  No left-sided jaw pain.  Has not noticed significant swelling of the face, or jaw.  No redness.  No recent dental visits.  Has been taking occasional Tylenol, and ibuprofen for pain.    Problem List:    Patient Active Problem List    Diagnosis Date Noted     Pyelonephritis 10/07/2017     Priority: Medium     Complicated UTI (urinary tract infection) 10/07/2017     Priority: Medium     Kidney stone 09/13/2017     Priority: Medium        Past Medical History:    Past Medical History:   Diagnosis Date     Asthma      Deaf      Kidney stone 09/14/2017     PONV (postoperative nausea and vomiting)        Past Surgical History:    Past Surgical History:   Procedure Laterality Date     CYSTOSCOPY, RETROGRADES, INSERT STENT URETER(S), COMBINED Right 9/14/2017    Procedure: COMBINED CYSTOSCOPY, RETROGRADES, INSERT STENT URETER(S);  Cysto , right stent placement;  Surgeon: Pavel Lundy MD;  Location: [facility] OR     LASER HOLMIUM LITHOTRIPSY URETER(S), INSERT STENT, COMBINED Right 10/5/2017    Procedure: COMBINED CYSTOSCOPY, URETEROSCOPY, LASER HOLMIUM LITHOTRIPSY URETER(S), INSERT STENT;  Cystoscopy, right ureteroscopy, laser lithotripsy, stent exchange;  Surgeon: Pavel Lundy MD;  Location: [facility] OR       Family History:    Family History    Problem Relation Age of Onset     Heart Disease Mother         MI, also frequent kidney infections       Social History:  Marital Status:  Single [1]  Social History     Tobacco Use     Smoking status: Never Smoker     Smokeless tobacco: Never Used   Substance Use Topics     Alcohol use: Yes     Comment: on occasion     Drug use: No        Medications:      carbamide peroxide (DEBROX) 6.5 % otic solution   clindamycin (CLEOCIN) 300 MG capsule   acetaminophen (TYLENOL) 500 MG tablet   cyclobenzaprine (FLEXERIL) 10 MG tablet   HYDROcodone-acetaminophen (NORCO) 5-325 MG per tablet   IBUPROFEN PO   ketorolac (TORADOL) 10 MG tablet   order for DME   order for DME   predniSONE (DELTASONE) 20 MG tablet         Review of Systems   Constitutional: Negative for fever.   HENT:        See HPI   Respiratory: Negative for shortness of breath.    Cardiovascular: Negative for chest pain.   Gastrointestinal: Negative for abdominal pain.   All other systems reviewed and are negative.      Physical Exam   BP: (!) 131/92  Heart Rate: 70  Resp: 18  Height: 182.9 cm (6')  Weight: 99.8 kg (220 lb)  SpO2: 97 %      Physical Exam  BP (!) 131/92   Resp 18   Ht 1.829 m (6')   Wt 99.8 kg (220 lb)   SpO2 97%   BMI 29.84 kg/m    General: alert and in no acute distress  Head: atraumatic, normocephalic  Oropharynx: Poor dentition, with multiple cavities present.  No visible abscess.  No significant tenderness to the right jaw/mandible.  No significant lymphadenopathy is appreciated.  Bilateral ears show significant amounts of cerumen.  Left ear with impacted cerumen and unable to visualize tympanic membrane.  Right ear did have significant amounts of cerumen which was removed with ear curette, and subsequently irrigated.  Abd: Soft, nontender, nondistended, no peritoneal signs  Musculoskel/Extremities: normal extremities, no edema, erythema, tenderness and full AROM of major joints without tenderness  Skin: no rashes, no diaphoresis and  skin color normal  Neuro: Patient awake, alert, oriented, speech is fluent, gait is normal  Psychiatric: affect/mood normal, cooperative, normal judgement/insight and memory intact      ED Course        Procedures               Critical Care time:  none               No results found for this or any previous visit (from the past 24 hour(s)).    Medications   clindamycin (CLEOCIN) capsule 300 mg (300 mg Oral Given 12/21/18 0038)       Assessments & Plan (with Medical Decision Making)  38 year old male, presenting with right-sided jaw pain over the past 1 week, however worsened over the past couple of days.  Patient is well-appearing, with no significant swelling, inflammation which is noted.  No visible abscess.  However, does have poor dentition, and possible occult abscess, or other dental infection contributing to patient's pain.  Additionally, patient has had significant cerumen impaction, does have mild erythema that was noted of the tympanic membrane, however this was after cerumen removal, and irrigation.  Patient will be treated with clindamycin for possible dental infection.  Allergy to amoxicillin.  Encourage frequent Tylenol, and ibuprofen.  Follow-up as needed with primary care provider.     I have reviewed the nursing notes.    I have reviewed the findings, diagnosis, plan and need for follow up with the patient.          Medication List      Started    carbamide peroxide 6.5 % otic solution  Commonly known as:  DEBROX  5 drops, Otic, 2 TIMES DAILY     clindamycin 300 MG capsule  Commonly known as:  CLEOCIN  300 mg, Oral, 4 TIMES DAILY            Final diagnoses:   Jaw pain   Dental infection   Bilateral impacted cerumen       12/20/2018   [facility] EMERGENCY DEPARTMENT     Pernell Barger MD  12/21/18 0100

## 2019-10-01 ENCOUNTER — HEALTH MAINTENANCE LETTER (OUTPATIENT)
Age: 39
End: 2019-10-01

## 2020-01-09 ENCOUNTER — OFFICE VISIT (OUTPATIENT)
Dept: FAMILY MEDICINE | Facility: CLINIC | Age: 40
End: 2020-01-09
Payer: COMMERCIAL

## 2020-01-09 VITALS
DIASTOLIC BLOOD PRESSURE: 88 MMHG | OXYGEN SATURATION: 97 % | RESPIRATION RATE: 18 BRPM | WEIGHT: 262 LBS | SYSTOLIC BLOOD PRESSURE: 130 MMHG | HEART RATE: 60 BPM | TEMPERATURE: 97.6 F | BODY MASS INDEX: 35.53 KG/M2

## 2020-01-09 DIAGNOSIS — H61.23 BILATERAL IMPACTED CERUMEN: ICD-10-CM

## 2020-01-09 DIAGNOSIS — J20.9 ACUTE BRONCHITIS, UNSPECIFIED ORGANISM: Primary | ICD-10-CM

## 2020-01-09 DIAGNOSIS — J45.21 MILD INTERMITTENT ASTHMA WITH ACUTE EXACERBATION: ICD-10-CM

## 2020-01-09 PROCEDURE — 99214 OFFICE O/P EST MOD 30 MIN: CPT | Mod: 25 | Performed by: NURSE PRACTITIONER

## 2020-01-09 PROCEDURE — 69209 REMOVE IMPACTED EAR WAX UNI: CPT | Mod: 50 | Performed by: NURSE PRACTITIONER

## 2020-01-09 RX ORDER — PREDNISONE 20 MG/1
40 TABLET ORAL DAILY
Qty: 10 TABLET | Refills: 0 | Status: SHIPPED | OUTPATIENT
Start: 2020-01-09 | End: 2020-03-10

## 2020-01-09 RX ORDER — ALBUTEROL SULFATE 90 UG/1
2 AEROSOL, METERED RESPIRATORY (INHALATION) EVERY 6 HOURS
Qty: 1 INHALER | Refills: 11 | Status: SHIPPED | OUTPATIENT
Start: 2020-01-09 | End: 2020-03-20

## 2020-01-09 ASSESSMENT — ENCOUNTER SYMPTOMS
SORE THROAT: 1
WHEEZING: 0
VOMITING: 0
ARTHRALGIAS: 0
SINUS PRESSURE: 1
NAUSEA: 0
SINUS PAIN: 1
FEVER: 0
ABDOMINAL PAIN: 0
DIARRHEA: 1
COUGH: 1
SHORTNESS OF BREATH: 1
FATIGUE: 1
CHEST TIGHTNESS: 1

## 2020-01-09 NOTE — PATIENT INSTRUCTIONS
You have a viral Bronchitis. Your cough can last up to 3 weeks.  1. Antibiotics are not recommended at this time.  2. Continue to use OTC antitussives, expectorants, antihistamines, and decongestants (do not use decongestants if you have high blood pressure) for symptom management.  3. Continue to use NSAIDs (such as ibuprofen) and/or Tylenol for pain and/or fevers.  4. Push fluids and rest.  5. Use steroid as prescribed.   6. If symptoms do not improve or worsen over the next week, please follow-up with your PCP.    Your ears had wax in them.  1. Wax was flushed out today.  2. You may use debrox OTC drops to soften the wax in the future. Place drops in effected ear and let sit for 15-30 minutes. Flush with water bulb syringe or warm water in the shower. You may repeat if needed.   3. You can do this a couple times a week as needed.  4. Avoid using Q-tips to clean our ears as they can push the wax further back in your ear.    Asthma:  1. Albuterol inhaler script sent over. Use as directed.        Patient Education     Viral Bronchitis (Adult)    You have a viral bronchitis. Bronchitis is inflammation and swelling of the lining of the lungs. This is often caused by an infection. Symptoms include a dry, hacking cough that is worse at night. The cough may bring up yellow-green mucus. You may also feel short of breath or wheeze. Other symptoms may include tiredness, chest discomfort, and chills.  Bronchitis that is caused by a virus is not treated with antibiotics. Instead, medicines may be given to help relieve symptoms. Symptoms can last up to 2 weeks, although the cough may last much longer.  This illness is contagious during the first few days and is spread through the air by coughing and sneezing, or by direct contact (touching the sick person and then touching your own eyes, nose, or mouth).  Most viral illnesses resolve within 10 to 14 days with rest and simple home remedies, although they may sometimes last for  several weeks.  Home care    If symptoms are severe, rest at home for the first 2 to 3 days. When you go back to your usual activities, don't let yourself get too tired.    Do not smoke. Also avoid being exposed to secondhand smoke.    You may use over-the-counter medicine to control fever or pain, unless another pain medicine was prescribed. If you have chronic liver or kidney disease or have ever had a stomach ulcer or gastrointestinal bleeding, talk with your healthcare provider before using these medicines. Also talk to your provider if you are taking medicine to prevent blood clots. Aspirin should never be given to anyone younger than 18 years of age who is ill with a viral infection or fever. It may cause severe liver or brain damage.    Your appetite may be poor, so a light diet is fine. Avoid dehydration by drinking 6 to 8 glasses of fluids per day (such as water, soft drinks, sports drinks, juices, tea, or soup). Extra fluids will help loosen secretions in the nose and lungs.    Over-the-counter cough, cold, and sore-throat medicines will not shorten the length of the illness, but they may help to reduce symptoms. Don't use decongestants if you have high blood pressure.  Follow-up care  Follow up with your healthcare provider, or as advised. If you had an X-ray or ECG (electrocardiogram), a specialist will review it. You will be notified of any new findings that may affect your care.  If you are age 65 or older, or if you have a chronic lung disease or condition that affects your immune system, or you smoke, ask your healthcare provider about getting a pneumococcal vaccine and a yearly flu shot (influenza vaccine).  When to seek medical advice  Call your healthcare provider right away if any of these occur:    Fever of 100.4 F (38 C) or higher, or as directed by your healthcare provider    Coughing up increased amounts of colored sputum    Weakness, drowsiness, headache, facial pain, ear pain, or a stiff  neck  Call 911  Call 911 if any of these occur:    Coughing up blood    Worsening weakness, drowsiness, headache, or stiff neck    Trouble breathing, wheezing, or pain with breathing  Date Last Reviewed: 6/1/2018 2000-2019 [employer]. [address] [place] [address]. All rights reserved. This information is not intended as a substitute for professional medical care. Always follow your healthcare professional's instructions.

## 2020-01-09 NOTE — NURSING NOTE
Chief Complaint   Patient presents with     Cough     x 1 week       Initial /88 (BP Location: Right arm, Patient Position: Chair, Cuff Size: Adult Large)   Pulse 60   Temp 97.6  F (36.4  C) (Tympanic)   Resp 18   Wt 118.8 kg (262 lb)   SpO2 97%   BMI 35.53 kg/m   Estimated body mass index is 35.53 kg/m  as calculated from the following:    Height as of 12/20/18: 1.829 m (6').    Weight as of this encounter: 118.8 kg (262 lb).    Patient presents to the clinic using No DME    Health Maintenance that is potentially due pending provider review:  NONE    n/a    Is there anyone who you would like to be able to receive your results? No  If yes have patient fill out HENRRY

## 2020-01-09 NOTE — PROGRESS NOTES
Subjective     Santo Toure is a 39 year old male who presents to clinic today for the following health issues:    HPI   RESPIRATORY SYMPTOMS      Duration: 1 week    Description  sore throat, facial pain/pressure and cough    Severity: moderate    Accompanying signs and symptoms: None    History (predisposing factors):  asthma    Precipitating or alleviating factors: None    Therapies tried and outcome:  OTC NSAID, guaifenesin- no relief        Patient Active Problem List   Diagnosis     Kidney stone     Pyelonephritis     Complicated UTI (urinary tract infection)     Mild intermittent asthma with acute exacerbation     Past Surgical History:   Procedure Laterality Date     CYSTOSCOPY, RETROGRADES, INSERT STENT URETER(S), COMBINED Right 9/14/2017    Procedure: COMBINED CYSTOSCOPY, RETROGRADES, INSERT STENT URETER(S);  Cysto , right stent placement;  Surgeon: Pavel Lundy MD;  Location: [facility]     LASER HOLMIUM LITHOTRIPSY URETER(S), INSERT STENT, COMBINED Right 10/5/2017    Procedure: COMBINED CYSTOSCOPY, URETEROSCOPY, LASER HOLMIUM LITHOTRIPSY URETER(S), INSERT STENT;  Cystoscopy, right ureteroscopy, laser lithotripsy, stent exchange;  Surgeon: Pavel Lunyd MD;  Location: [facility]       Social History     Tobacco Use     Smoking status: Never Smoker     Smokeless tobacco: Never Used   Substance Use Topics     Alcohol use: Yes     Comment: on occasion     Family History   Problem Relation Age of Onset     Heart Disease Mother         MI, also frequent kidney infections         Current Outpatient Medications   Medication Sig Dispense Refill     acetaminophen (TYLENOL) 500 MG tablet Take 2 tablets (1,000 mg) by mouth every 6 hours as needed for mild pain or fever 1 Bottle 0     albuterol (PROAIR HFA/PROVENTIL HFA/VENTOLIN HFA) 108 (90 Base) MCG/ACT inhaler Inhale 2 puffs into the lungs every 6 hours 1 Inhaler 11     IBUPROFEN PO Take 400-600 mg by mouth every 6 hours as needed for  moderate pain       predniSONE (DELTASONE) 20 MG tablet Take 2 tablets (40 mg) by mouth daily for 5 days 10 tablet 0     cyclobenzaprine (FLEXERIL) 10 MG tablet Take 0.5-1 tablets (5-10 mg) by mouth 3 times daily as needed for muscle spasms (Patient not taking: Reported on 8/10/2018) 30 tablet 1     HYDROcodone-acetaminophen (NORCO) 5-325 MG per tablet Take 1 tablet by mouth every 4 hours as needed for pain (Patient not taking: Reported on 8/10/2018) 18 tablet 0     ketorolac (TORADOL) 10 MG tablet Take 1 tablet (10 mg) by mouth every 6 hours as needed for moderate pain (Patient not taking: Reported on 8/10/2018) 20 tablet 0     order for DME Equipment being ordered: Spider elbow pad 1 Units 0     order for DME Elbow brace 1 Units 0     predniSONE (DELTASONE) 20 MG tablet Take 1 tablet (20 mg) by mouth 2 times daily (Patient not taking: Reported on 1/9/2020) 10 tablet 0     Allergies   Allergen Reactions     Percocet [Oxycodone-Acetaminophen] Nausea and Vomiting     Penicillins Other (See Comments) and Rash     Vomiting as a infant     BP Readings from Last 3 Encounters:   01/09/20 130/88   12/20/18 (!) 131/92   08/13/18 108/72    Wt Readings from Last 3 Encounters:   01/09/20 118.8 kg (262 lb)   12/20/18 99.8 kg (220 lb)   08/13/18 109.8 kg (242 lb)              Reviewed and updated as needed this visit by Provider  Lara Agrawal, ANDREINA, NP-C 1/9/2020 9:47 AM     Tobacco  Allergies  Meds  Problems  Med Hx  Surg Hx  Fam Hx         Review of Systems   Constitutional: Positive for fatigue. Negative for fever.   HENT: Positive for congestion, postnasal drip, sinus pressure (frontal), sinus pain and sore throat.    Respiratory: Positive for cough, chest tightness and shortness of breath. Negative for wheezing.    Gastrointestinal: Positive for diarrhea. Negative for abdominal pain, nausea and vomiting.   Musculoskeletal: Negative for arthralgias.            Objective    /88 (BP Location: Right arm, Patient  Position: Chair, Cuff Size: Adult Large)   Pulse 60   Temp 97.6  F (36.4  C) (Tympanic)   Resp 18   Wt 118.8 kg (262 lb)   SpO2 97%   BMI 35.53 kg/m    Body mass index is 35.53 kg/m .  Physical Exam  Vitals signs and nursing note reviewed.   Constitutional:       Appearance: Normal appearance. He is not toxic-appearing.   HENT:      Head: Normocephalic and atraumatic.      Right Ear: Tympanic membrane and external ear normal. There is impacted cerumen (still with small amount of cerumen after flush).      Left Ear: Tympanic membrane, ear canal and external ear normal. There is impacted cerumen (canal cleared after flush).      Ears:      Comments: Deaf,  present     Nose: Congestion and rhinorrhea present.      Mouth/Throat:      Mouth: Mucous membranes are moist.      Pharynx: Oropharynx is clear. Posterior oropharyngeal erythema (posterior) present.   Neck:      Musculoskeletal: Normal range of motion and neck supple.   Cardiovascular:      Rate and Rhythm: Normal rate and regular rhythm.      Pulses: Normal pulses.      Heart sounds: Normal heart sounds.   Pulmonary:      Effort: Pulmonary effort is normal.      Breath sounds: Normal breath sounds.   Musculoskeletal: Normal range of motion.   Skin:     General: Skin is warm and dry.   Neurological:      Mental Status: He is alert and oriented to person, place, and time.   Psychiatric:         Mood and Affect: Mood normal.         Behavior: Behavior normal.              Assessment & Plan     1. Acute bronchitis, unspecified organism  1. Antibiotics are not recommended at this time.  2. Continue to use OTC antitussives, expectorants, antihistamines, and decongestants (do not use decongestants if you have high blood pressure) for symptom management.  3. Continue to use NSAIDs (such as ibuprofen) and/or Tylenol for pain and/or fevers.  4. Push fluids and rest.  5. Use steroid as prescribed.   6. If symptoms do not improve or worsen over the next  week, please follow-up with your PCP.    - predniSONE (DELTASONE) 20 MG tablet; Take 2 tablets (40 mg) by mouth daily for 5 days  Dispense: 10 tablet; Refill: 0    2. Bilateral impacted cerumen  1. Wax was flushed out today.  2. You may use debrox OTC drops to soften the wax in the future. Place drops in effected ear and let sit for 15-30 minutes. Flush with water bulb syringe or warm water in the shower. You may repeat if needed.   3. You can do this a couple times a week as needed.  4. Avoid using Q-tips to clean our ears as they can push the wax further back in your ear.    - REMOVE IMPACTED CERUMEN    3. Mild intermittent asthma with acute exacerbation  Exacerbation with current bronchitis. Has not had to use albuterol inhaler in a couple months. Requesting refill.  1. Albuterol inhaler script sent over. Use as directed.    - albuterol (PROAIR HFA/PROVENTIL HFA/VENTOLIN HFA) 108 (90 Base) MCG/ACT inhaler; Inhale 2 puffs into the lungs every 6 hours  Dispense: 1 Inhaler; Refill: 11       Return in about 1 week (around 1/16/2020), or if symptoms worsen or fail to improve.    ROSSY Greco [facility]

## 2020-01-10 ASSESSMENT — ASTHMA QUESTIONNAIRES: ACT_TOTALSCORE: 15

## 2020-02-10 ENCOUNTER — TELEPHONE (OUTPATIENT)
Dept: FAMILY MEDICINE | Facility: CLINIC | Age: 40
End: 2020-02-10

## 2020-02-10 NOTE — TELEPHONE ENCOUNTER
Panel Management Review      Patient has the following on his problem list:     Asthma review     ACT Total Scores 1/9/2020   ACT TOTAL SCORE (Goal Greater than or Equal to 20) 15   In the past 12 months, how many times did you visit the emergency room for your asthma without being admitted to the hospital? 0   In the past 12 months, how many times were you hospitalized overnight because of your asthma? 0      1. Is Asthma diagnosis on the Problem List? Yes    2. Is Asthma listed on Health Maintenance? Yes    3. Patient is due for:  ACT and AAP      Composite cancer screening  Chart review shows that this patient is due/due soon for the following None  Summary:    Patient is due/failing the following:   AAP and ACT    Action needed:   Patient needs to do ACT.    Type of outreach:    Copy of ACT mailed to patient, will reach out in 5 days.    Questions for provider review:    None                                                                                                                                    Cate Guo MA      Chart routed to Care Team .

## 2020-02-10 NOTE — LETTER
[facility]  [address] [place] [id number]  [phone]      February 10, 2020    Santo Toure                                                                                                                     [address] [address] [facility] [address]            Dear Santo,    At [facility] we care about your health and well-being. A review of your chart has indicated that you are due for an Asthma Control Test. For copyright reasons we have attached a copy of the questionnaire. Please complete the questions and mail them back to the clinic in the provided envelope.    You may contact the clinic at [phone] if you have any questions or concerns about this request.        Sincerely,       [facility] Care Staff/ ss

## 2020-02-10 NOTE — LETTER
My Asthma Action Plan    Name: Santo Toure   YOB: 1980  Date: 2/10/2020   My doctor: ROSSY Wynne CNP   My clinic: [facility]        My Rescue Medicine:   Albuterol inhaler (Proair/Ventolin/Proventil HFA)  2-4 puffs EVERY 4 HOURS as needed. Use a spacer if recommended by your provider.   My Asthma Severity:   Intermittent / Exercise Induced  Know your asthma triggers: Patient is unaware of triggers             GREEN ZONE   Good Control    I feel good    No cough or wheeze    Can work, sleep and play without asthma symptoms       Take your asthma control medicine every day.     1. If exercise triggers your asthma, take your rescue medication    15 minutes before exercise or sports, and    During exercise if you have asthma symptoms  2. Spacer to use with inhaler: If you have a spacer, make sure to use it with your inhaler             YELLOW ZONE Getting Worse  I have ANY of these:    I do not feel good    Cough or wheeze    Chest feels tight    Wake up at night   1. Keep taking your Green Zone medications  2. Start taking your rescue medicine:    every 20 minutes for up to 1 hour. Then every 4 hours for 24-48 hours.  3. If you stay in the Yellow Zone for more than 12-24 hours, contact your doctor.  4. If you do not return to the Green Zone in 12-24 hours or you get worse, start taking your oral steroid medicine if prescribed by your provider.           RED ZONE Medical Alert - Get Help  I have ANY of these:    I feel awful    Medicine is not helping    Breathing getting harder    Trouble walking or talking    Nose opens wide to breathe       1. Take your rescue medicine NOW  2. If your provider has prescribed an oral steroid medicine, start taking it NOW  3. Call your doctor NOW  4. If you are still in the Red Zone after 20 minutes and you have not reached your doctor:    Take your rescue medicine again and    Call 911 or go to the emergency room right away    See your  regular doctor within 2 weeks of an Emergency Room or Urgent Care visit for follow-up treatment.          Annual Reminders:  Meet with Asthma Educator,  Flu Shot in the Fall, consider Pneumonia Vaccination for patients with asthma (aged 19 and older).    Pharmacy:    [facility], [place] - [address] [place] [address] PHARMACY [facility][id number] [facility] [place] - [address]    Electronically signed by ROSSY Wynne CNP   Date: 02/10/20                    Asthma Triggers  How To Control Things That Make Your Asthma Worse    Triggers are things that make your asthma worse.  Look at the list below to help you find your triggers and   what you can do about them. You can help prevent asthma flare-ups by staying away from your triggers.      Trigger                                                          What you can do   Cigarette Smoke  Tobacco smoke can make asthma worse. Do not allow smoking in your home, car or around you.  Be sure no one smokes at a child s day care or school.  If you smoke, ask your health care provider for ways to help you quit.  Ask family members to quit too.  Ask your health care provider for a referral to Quit Plan to help you quit smoking, or call 2-848-087-PLAN.     Colds, Flu, Bronchitis  These are common triggers of asthma. Wash your hands often.  Don t touch your eyes, nose or mouth.  Get a flu shot every year.     Dust Mites  These are tiny bugs that live in cloth or carpet. They are too small to see. Wash sheets and blankets in hot water every week.   Encase pillows and mattress in dust mite proof covers.  Avoid having carpet if you can. If you have carpet, vacuum weekly.   Use a dust mask and HEPA vacuum.   Pollen and Outdoor Mold  Some people are allergic to trees, grass, or weed pollen, or molds. Try to keep your windows closed.  Limit time out doors when pollen count is high.   Ask you health care provider about taking medicine during allergy season.      Animal Dander  Some people are allergic to skin flakes, urine or saliva from pets with fur or feathers. Keep pets with fur or feathers out of your home.    If you can t keep the pet outdoors, then keep the pet out of your bedroom.  Keep the bedroom door closed.  Keep pets off cloth furniture and away from stuffed toys.     Mice, Rats, and Cockroaches  Some people are allergic to the waste from these pests.   Cover food and garbage.  Clean up spills and food crumbs.  Store grease in the refrigerator.   Keep food out of the bedroom.   Indoor Mold  This can be a trigger if your home has high moisture. Fix leaking faucets, pipes, or other sources of water.   Clean moldy surfaces.  Dehumidify basement if it is damp and smelly.   Smoke, Strong Odors, and Sprays  These can reduce air quality. Stay away from strong odors and sprays, such as perfume, powder, hair spray, paints, smoke incense, paint, cleaning products, candles and new carpet.   Exercise or Sports  Some people with asthma have this trigger. Be active!  Ask your doctor about taking medicine before sports or exercise to prevent symptoms.    Warm up for 5-10 minutes before and after sports or exercise.     Other Triggers of Asthma  Cold air:  Cover your nose and mouth with a scarf.  Sometimes laughing or crying can be a trigger.  Some medicines and food can trigger asthma.

## 2020-03-10 ENCOUNTER — HOSPITAL ENCOUNTER (EMERGENCY)
Facility: CLINIC | Age: 40
Discharge: HOME | End: 2020-03-10
Attending: NURSE PRACTITIONER | Admitting: NURSE PRACTITIONER
Payer: COMMERCIAL

## 2020-03-10 ENCOUNTER — APPOINTMENT (OUTPATIENT)
Dept: CT IMAGING | Facility: CLINIC | Age: 40
End: 2020-03-10
Attending: NURSE PRACTITIONER
Payer: COMMERCIAL

## 2020-03-10 VITALS
SYSTOLIC BLOOD PRESSURE: 140 MMHG | OXYGEN SATURATION: 97 % | BODY MASS INDEX: 33.18 KG/M2 | RESPIRATION RATE: 20 BRPM | DIASTOLIC BLOOD PRESSURE: 96 MMHG | HEIGHT: 72 IN | TEMPERATURE: 97.3 F | WEIGHT: 245 LBS | HEART RATE: 64 BPM

## 2020-03-10 DIAGNOSIS — R10.31 RIGHT LOWER QUADRANT PAIN: ICD-10-CM

## 2020-03-10 DIAGNOSIS — K76.0 NON-ALCOHOLIC FATTY LIVER DISEASE: ICD-10-CM

## 2020-03-10 LAB
ALBUMIN SERPL-MCNC: 3.7 G/DL (ref 3.4–5)
ALBUMIN UR-MCNC: NEGATIVE MG/DL
ALP SERPL-CCNC: 68 U/L (ref 40–150)
ALT SERPL W P-5'-P-CCNC: 53 U/L (ref 0–70)
ANION GAP SERPL CALCULATED.3IONS-SCNC: 6 MMOL/L (ref 3–14)
APPEARANCE UR: CLEAR
AST SERPL W P-5'-P-CCNC: 25 U/L (ref 0–45)
BASOPHILS # BLD AUTO: 0.1 10E9/L (ref 0–0.2)
BASOPHILS NFR BLD AUTO: 0.8 %
BILIRUB SERPL-MCNC: 0.4 MG/DL (ref 0.2–1.3)
BILIRUB UR QL STRIP: NEGATIVE
BUN SERPL-MCNC: 8 MG/DL (ref 7–30)
CALCIUM SERPL-MCNC: 9.3 MG/DL (ref 8.5–10.1)
CHLORIDE SERPL-SCNC: 108 MMOL/L (ref 94–109)
CO2 SERPL-SCNC: 28 MMOL/L (ref 20–32)
COLOR UR AUTO: COLORLESS
CREAT SERPL-MCNC: 0.81 MG/DL (ref 0.66–1.25)
DIFFERENTIAL METHOD BLD: NORMAL
EOSINOPHIL # BLD AUTO: 0.2 10E9/L (ref 0–0.7)
EOSINOPHIL NFR BLD AUTO: 2.9 %
ERYTHROCYTE [DISTWIDTH] IN BLOOD BY AUTOMATED COUNT: 12.7 % (ref 10–15)
GFR SERPL CREATININE-BSD FRML MDRD: >90 ML/MIN/{1.73_M2}
GLUCOSE SERPL-MCNC: 79 MG/DL (ref 70–99)
GLUCOSE UR STRIP-MCNC: NEGATIVE MG/DL
HCT VFR BLD AUTO: 44.2 % (ref 40–53)
HGB BLD-MCNC: 14.6 G/DL (ref 13.3–17.7)
HGB UR QL STRIP: NEGATIVE
IMM GRANULOCYTES # BLD: 0 10E9/L (ref 0–0.4)
IMM GRANULOCYTES NFR BLD: 0.3 %
KETONES UR STRIP-MCNC: NEGATIVE MG/DL
LEUKOCYTE ESTERASE UR QL STRIP: NEGATIVE
LIPASE SERPL-CCNC: 115 U/L (ref 73–393)
LYMPHOCYTES # BLD AUTO: 2.2 10E9/L (ref 0.8–5.3)
LYMPHOCYTES NFR BLD AUTO: 28 %
MCH RBC QN AUTO: 27.2 PG (ref 26.5–33)
MCHC RBC AUTO-ENTMCNC: 33 G/DL (ref 31.5–36.5)
MCV RBC AUTO: 83 FL (ref 78–100)
MONOCYTES # BLD AUTO: 0.6 10E9/L (ref 0–1.3)
MONOCYTES NFR BLD AUTO: 7.9 %
NEUTROPHILS # BLD AUTO: 4.8 10E9/L (ref 1.6–8.3)
NEUTROPHILS NFR BLD AUTO: 60.1 %
NITRATE UR QL: NEGATIVE
NRBC # BLD AUTO: 0 10*3/UL
NRBC BLD AUTO-RTO: 0 /100
PH UR STRIP: 7 PH (ref 5–7)
PLATELET # BLD AUTO: 171 10E9/L (ref 150–450)
POTASSIUM SERPL-SCNC: 3.8 MMOL/L (ref 3.4–5.3)
PROT SERPL-MCNC: 7 G/DL (ref 6.8–8.8)
RBC # BLD AUTO: 5.36 10E12/L (ref 4.4–5.9)
SODIUM SERPL-SCNC: 142 MMOL/L (ref 133–144)
SOURCE: ABNORMAL
SP GR UR STRIP: 1 (ref 1–1.03)
UROBILINOGEN UR STRIP-MCNC: 0 MG/DL (ref 0–2)
WBC # BLD AUTO: 8 10E9/L (ref 4–11)

## 2020-03-10 PROCEDURE — 74177 CT ABD & PELVIS W/CONTRAST: CPT

## 2020-03-10 PROCEDURE — 96360 HYDRATION IV INFUSION INIT: CPT | Mod: 59 | Performed by: NURSE PRACTITIONER

## 2020-03-10 PROCEDURE — 85025 COMPLETE CBC W/AUTO DIFF WBC: CPT | Performed by: NURSE PRACTITIONER

## 2020-03-10 PROCEDURE — 99285 EMERGENCY DEPT VISIT HI MDM: CPT | Mod: 25 | Performed by: NURSE PRACTITIONER

## 2020-03-10 PROCEDURE — 80053 COMPREHEN METABOLIC PANEL: CPT | Performed by: NURSE PRACTITIONER

## 2020-03-10 PROCEDURE — 87086 URINE CULTURE/COLONY COUNT: CPT | Performed by: NURSE PRACTITIONER

## 2020-03-10 PROCEDURE — 25000128 H RX IP 250 OP 636: Performed by: NURSE PRACTITIONER

## 2020-03-10 PROCEDURE — 99284 EMERGENCY DEPT VISIT MOD MDM: CPT | Mod: Z6 | Performed by: NURSE PRACTITIONER

## 2020-03-10 PROCEDURE — 81003 URINALYSIS AUTO W/O SCOPE: CPT | Performed by: FAMILY MEDICINE

## 2020-03-10 PROCEDURE — 25000125 ZZHC RX 250: Performed by: NURSE PRACTITIONER

## 2020-03-10 PROCEDURE — 25800030 ZZH RX IP 258 OP 636: Performed by: NURSE PRACTITIONER

## 2020-03-10 PROCEDURE — 83690 ASSAY OF LIPASE: CPT | Performed by: NURSE PRACTITIONER

## 2020-03-10 RX ORDER — IOPAMIDOL 755 MG/ML
100 INJECTION, SOLUTION INTRAVASCULAR ONCE
Status: COMPLETED | OUTPATIENT
Start: 2020-03-10 | End: 2020-03-10

## 2020-03-10 RX ADMIN — SODIUM CHLORIDE 1000 ML: 9 INJECTION, SOLUTION INTRAVENOUS at 13:25

## 2020-03-10 RX ADMIN — SODIUM CHLORIDE 70 ML: 9 INJECTION, SOLUTION INTRAVENOUS at 13:47

## 2020-03-10 RX ADMIN — IOPAMIDOL 100 ML: 755 INJECTION, SOLUTION INTRAVENOUS at 13:47

## 2020-03-10 ASSESSMENT — ENCOUNTER SYMPTOMS
CHILLS: 0
NAUSEA: 0
WEAKNESS: 0
DIARRHEA: 0
JOINT SWELLING: 0
FREQUENCY: 1
BLOOD IN STOOL: 0
DIZZINESS: 0
MYALGIAS: 0
HEADACHES: 0
FATIGUE: 0
FEVER: 0
HEMATURIA: 0
ARTHRALGIAS: 0
ABDOMINAL PAIN: 1
NUMBNESS: 0
DYSURIA: 0
VOMITING: 0
LIGHT-HEADEDNESS: 0
FLANK PAIN: 1
COUGH: 0
SHORTNESS OF BREATH: 0
CONSTIPATION: 0

## 2020-03-10 ASSESSMENT — MIFFLIN-ST. JEOR: SCORE: 2064.31

## 2020-03-10 NOTE — ED NOTES
Pt deaf with our  available to help with communication.   Pt reports on Saturday right lower back pain that wraps around to the front into right testicle pain similar to kidney stone pain that pt had 2 years ago.   Pt comfortable at this time and will wait for provider to assess for further orders.

## 2020-03-10 NOTE — ED PROVIDER NOTES
History     Chief Complaint   Patient presents with     Abdominal Pain     flank pain, onset saturday, feels the same as kidney stones he had 2 years ago.      HPI  Santo Toure is a 39 year old male who presents to the emergency department for evaluation of abdominal pain for ~3 days. Patient states three days ago he developed right lower back pain which progressed into right lower abdominal pain shortly after. He states worsening pain to the right lower quadrant radiating into the right going area. Pain is present with sitting, standing and walking. Resting pain rated 5/10, pain with palpation or exertion is 6/10. Denies fever, dysuria, urinary urgency, hematuria, nausea, vomiting and diarrhea. Denies other abdominal pain. Notes slight increase in urinary frequency. He had surgical removal of kidney stone 2 years ago, he states this incident 'slightly resembles' previous kidney stone. Has not started any new medications. No OTC medications/interventions prior to arrival. Last bowel movement was yesterday. He is a social drinker without illegal drug use. Denies cough, chest pain, and shortness of breath.    Allergies:  Allergies   Allergen Reactions     Percocet [Oxycodone-Acetaminophen] Nausea and Vomiting     Penicillins Other (See Comments) and Rash     Vomiting as a infant       Problem List:    Patient Active Problem List    Diagnosis Date Noted     Mild intermittent asthma with acute exacerbation 01/09/2020     Priority: Medium     Pyelonephritis 10/07/2017     Priority: Medium     Complicated UTI (urinary tract infection) 10/07/2017     Priority: Medium     Kidney stone 09/13/2017     Priority: Medium        Past Medical History:    Past Medical History:   Diagnosis Date     Asthma      Deaf      Kidney stone 09/14/2017     PONV (postoperative nausea and vomiting)        Past Surgical History:    Past Surgical History:   Procedure Laterality Date     CYSTOSCOPY, RETROGRADES, INSERT STENT URETER(S),  COMBINED Right 9/14/2017    Procedure: COMBINED CYSTOSCOPY, RETROGRADES, INSERT STENT URETER(S);  Cysto , right stent placement;  Surgeon: Pavel Lundy MD;  Location: [facility]     LASER HOLMIUM LITHOTRIPSY URETER(S), INSERT STENT, COMBINED Right 10/5/2017    Procedure: COMBINED CYSTOSCOPY, URETEROSCOPY, LASER HOLMIUM LITHOTRIPSY URETER(S), INSERT STENT;  Cystoscopy, right ureteroscopy, laser lithotripsy, stent exchange;  Surgeon: Pavel Lundy MD;  Location: [facility] OR       Family History:    Family History   Problem Relation Age of Onset     Heart Disease Mother         MI, also frequent kidney infections       Social History:  Marital Status:  Single [1]  Social History     Tobacco Use     Smoking status: Never Smoker     Smokeless tobacco: Never Used   Substance Use Topics     Alcohol use: Yes     Comment: on occasion     Drug use: No        Medications:    albuterol (PROAIR HFA/PROVENTIL HFA/VENTOLIN HFA) 108 (90 Base) MCG/ACT inhaler  IBUPROFEN PO  acetaminophen (TYLENOL) 500 MG tablet          Review of Systems   Constitutional: Negative for chills, fatigue and fever.   Respiratory: Negative for cough and shortness of breath.    Cardiovascular: Negative for chest pain.   Gastrointestinal: Positive for abdominal pain. Negative for blood in stool, constipation, diarrhea, nausea and vomiting.   Genitourinary: Positive for flank pain (right) and frequency. Negative for decreased urine volume, discharge, dysuria, hematuria, penile pain, penile swelling, scrotal swelling, testicular pain and urgency.   Musculoskeletal: Negative for arthralgias, joint swelling and myalgias.   Neurological: Negative for dizziness, weakness, light-headedness, numbness and headaches.   All other systems reviewed and are negative.      Physical Exam   BP: (!) 140/96  Pulse: 64  Temp: 97.3  F (36.3  C)  Resp: 20  Height: 182.9 cm (6')  Weight: 111.1 kg (245 lb)  SpO2: 97 %      Physical Exam  Constitutional:        General: He is not in acute distress.     Appearance: He is well-developed. He is not ill-appearing or diaphoretic.   HENT:      Right Ear: Tympanic membrane normal.      Left Ear: Tympanic membrane normal.      Nose: Nose normal.      Mouth/Throat:      Pharynx: No posterior oropharyngeal erythema.   Eyes:      Conjunctiva/sclera: Conjunctivae normal.      Pupils: Pupils are equal, round, and reactive to light.   Neck:      Musculoskeletal: Normal range of motion and neck supple.   Cardiovascular:      Rate and Rhythm: Normal rate and regular rhythm.   Pulmonary:      Effort: Pulmonary effort is normal. No respiratory distress.      Breath sounds: Normal breath sounds and air entry. No decreased air movement. No decreased breath sounds, wheezing or rhonchi.   Abdominal:      General: There is no distension.      Palpations: Abdomen is soft.      Tenderness: There is abdominal tenderness in the right upper quadrant and right lower quadrant. There is right CVA tenderness. There is no left CVA tenderness, guarding or rebound. Negative signs include Conteh's sign and McBurney's sign.      Hernia: No hernia is present.       Musculoskeletal: Normal range of motion.   Skin:     General: Skin is warm.      Capillary Refill: Capillary refill takes less than 2 seconds.   Neurological:      Mental Status: He is alert and oriented to person, place, and time.         ED Course        Procedures    Results for orders placed or performed during the hospital encounter of 03/10/20 (from the past 24 hour(s))   UA reflex to Microscopic   Result Value Ref Range    Color Urine Colorless     Appearance Urine Clear     Glucose Urine Negative NEG^Negative mg/dL    Bilirubin Urine Negative NEG^Negative    Ketones Urine Negative NEG^Negative mg/dL    Specific Gravity Urine 1.002 (L) 1.003 - 1.035    Blood Urine Negative NEG^Negative    pH Urine 7.0 5.0 - 7.0 pH    Protein Albumin Urine Negative NEG^Negative mg/dL    Urobilinogen mg/dL 0.0  0.0 - 2.0 mg/dL    Nitrite Urine Negative NEG^Negative    Leukocyte Esterase Urine Negative NEG^Negative    Source Unspecified Urine    CBC with platelets, differential   Result Value Ref Range    WBC 8.0 4.0 - 11.0 10e9/L    RBC Count 5.36 4.4 - 5.9 10e12/L    Hemoglobin 14.6 13.3 - 17.7 g/dL    Hematocrit 44.2 40.0 - 53.0 %    MCV 83 78 - 100 fl    MCH 27.2 26.5 - 33.0 pg    MCHC 33.0 31.5 - 36.5 g/dL    RDW 12.7 10.0 - 15.0 %    Platelet Count 171 150 - 450 10e9/L    Diff Method Automated Method     % Neutrophils 60.1 %    % Lymphocytes 28.0 %    % Monocytes 7.9 %    % Eosinophils 2.9 %    % Basophils 0.8 %    % Immature Granulocytes 0.3 %    Nucleated RBCs 0 0 /100    Absolute Neutrophil 4.8 1.6 - 8.3 10e9/L    Absolute Lymphocytes 2.2 0.8 - 5.3 10e9/L    Absolute Monocytes 0.6 0.0 - 1.3 10e9/L    Absolute Eosinophils 0.2 0.0 - 0.7 10e9/L    Absolute Basophils 0.1 0.0 - 0.2 10e9/L    Abs Immature Granulocytes 0.0 0 - 0.4 10e9/L    Absolute Nucleated RBC 0.0    Comprehensive metabolic panel   Result Value Ref Range    Sodium 142 133 - 144 mmol/L    Potassium 3.8 3.4 - 5.3 mmol/L    Chloride 108 94 - 109 mmol/L    Carbon Dioxide 28 20 - 32 mmol/L    Anion Gap 6 3 - 14 mmol/L    Glucose 79 70 - 99 mg/dL    Urea Nitrogen 8 7 - 30 mg/dL    Creatinine 0.81 0.66 - 1.25 mg/dL    GFR Estimate >90 >60 mL/min/[1.73_m2]    GFR Estimate If Black >90 >60 mL/min/[1.73_m2]    Calcium 9.3 8.5 - 10.1 mg/dL    Bilirubin Total 0.4 0.2 - 1.3 mg/dL    Albumin 3.7 3.4 - 5.0 g/dL    Protein Total 7.0 6.8 - 8.8 g/dL    Alkaline Phosphatase 68 40 - 150 U/L    ALT 53 0 - 70 U/L    AST 25 0 - 45 U/L   Lipase   Result Value Ref Range    Lipase 115 73 - 393 U/L   CT Abdomen Pelvis w Contrast    Narrative    CT ABDOMEN AND PELVIS WITH CONTRAST 3/10/2020 1:57 PM     HISTORY: Abdominal distension; abdominal pain, acute, right lower  quadrant, afebrile, history of kidney stones .    COMPARISON: CT abdomen/pelvis 10/7/2017.    TECHNIQUE: Axial  images from the lung bases to the symphysis are  performed with additional coronal reformatted images. 100 mL of Isovue  370 are given intravenously. Radiation dose for this scan was reduced  using automated exposure control, adjustment of the mA and/or kV  according to patient size, or iterative reconstruction technique.    FINDINGS: Calcified granuloma is noted at the posterior right lung  base. Mild dependent atelectasis is present. Lung bases are otherwise  clear. No pleural fluid.    Abdomen: There is diffuse fatty infiltration of the liver. Gallbladder  is decompressed. The spleen, pancreas, adrenal glands and kidneys are  unremarkable. Scattered subcentimeter renal cortical cysts are  present. These are too small to characterize by CT, but are probably  benign. Right ureteral stent has been removed. No urinary tract  calculi or hydronephrosis. No enlarged abdominal lymph nodes. The  bowel is normal in caliber without obstruction, diverticulitis or  appendicitis.    Pelvis: The bladder, prostate and rectum are unremarkable. No enlarged  pelvic lymph nodes or free fluid. Bone window examination is  unremarkable.      Impression    IMPRESSION:  1. No urinary tract calculi or hydronephrosis. Scattered probable  renal cortical cysts. These are too small to characterize by CT, but  are probably benign in nature. No further follow-up unless clinically  warranted.  2. Fatty infiltration of the liver. No calcified gallstones or  gallbladder distention.  3. No bowel obstruction, diverticulitis or appendicitis.    MASSIMO WU MD       Medications   0.9% sodium chloride BOLUS (0 mLs Intravenous Stopped 3/10/20 1432)   iopamidol (ISOVUE-370) solution 100 mL (100 mLs Intravenous Given 3/10/20 1347)   sodium chloride 0.9 % bag 500mL for CT scan flush use (70 mLs Intravenous Given 3/10/20 1347)       Assessments & Plan (with Medical Decision Making)   Patient is a 39 year old male who presents to the urgent care for  evaluation of right lower quadrant pain. Exam also reveals 'slight' right upper quadrant pain. Patient is well appearing during visit and in no acute distress. Unremarkable blood work and urine, see above. CT completed, no kidney stones present. CT scan revealing fatty infiltration of the liver. Discussed at length with patient and spouse important lifestyle modifications due to findings as well as risk of developing cirrhosis. Reassured of no acute abdomin including appendicitis, renal calculi or urinary tract infection. Patient and spouse verbalized understanding of information and discharge instructions. Return precautions reviewed, all questions answered. Patient discharged in stable condition with spouse.  used throughout visit.   I have reviewed the nursing notes.    I have reviewed the findings, diagnosis, plan and need for follow up with the patient.    Discharge Medication List as of 3/10/2020  2:33 PM          Final diagnoses:   Non-alcoholic fatty liver disease   Right lower quadrant pain       3/10/2020   [facility] EMERGENCY DEPARTMENT     Gabrielle Feliciano, ROSSY CNP  03/10/20 1503       Gabrielle Feliciano APRN CNP  03/10/20 1529

## 2020-03-10 NOTE — ED AVS SNAPSHOT
[facility] Emergency Department  [address] [address]  Phone:  [phone]  Fax:  [phone]                                    Santo Toure   MRN: [id number]    Department:  [facility] Emergency Department   Date of Visit:  3/10/2020           After Visit Summary Signature Page    I have received my discharge instructions, and my questions have been answered. I have discussed any challenges I see with this plan with the nurse or doctor.    ..........................................................................................................................................  Patient/Patient Representative Signature      ..........................................................................................................................................  Patient Representative Print Name and Relationship to Patient    ..................................................               ................................................  Date                                   Time    ..........................................................................................................................................  Reviewed by Signature/Title    ...................................................              ..............................................  Date                                               Time          22EPIC Rev 08/18

## 2020-03-11 NOTE — RESULT ENCOUNTER NOTE
Emergency Dept/Urgent Care discharge antibiotic (if prescribed): None   No changes in treatment per Urine culture protocol.

## 2020-03-12 LAB
BACTERIA SPEC CULT: NORMAL
Lab: NORMAL
SPECIMEN SOURCE: NORMAL

## 2020-03-12 NOTE — RESULT ENCOUNTER NOTE
Final urine culture report is NEGATIVE per [facility] ED Lab Result protocol.    If NEGATIVE result, no change in treatment, per [facility] ED Lab Result protocol.

## 2020-03-19 ENCOUNTER — E-VISIT (OUTPATIENT)
Dept: FAMILY MEDICINE | Facility: CLINIC | Age: 40
End: 2020-03-19
Payer: COMMERCIAL

## 2020-03-19 DIAGNOSIS — J21.9 ACUTE BRONCHIOLITIS, UNSPECIFIED: ICD-10-CM

## 2020-03-19 DIAGNOSIS — J45.21 MILD INTERMITTENT ASTHMA WITH ACUTE EXACERBATION: ICD-10-CM

## 2020-03-19 DIAGNOSIS — Z20.822 SUSPECTED COVID-19 VIRUS INFECTION: Primary | ICD-10-CM

## 2020-03-19 DIAGNOSIS — R05.9 COUGH: ICD-10-CM

## 2020-03-19 PROCEDURE — 99421 OL DIG E/M SVC 5-10 MIN: CPT | Performed by: NURSE PRACTITIONER

## 2020-03-20 RX ORDER — ALBUTEROL SULFATE 90 UG/1
2 AEROSOL, METERED RESPIRATORY (INHALATION) EVERY 6 HOURS
Qty: 1 INHALER | Refills: 11 | Status: SHIPPED | OUTPATIENT
Start: 2020-03-20 | End: 2022-11-23

## 2020-03-20 NOTE — PATIENT INSTRUCTIONS
Due to your cough you will need to follow the Covid 19 isolation recommendations    Please use the information at the end of this document to sign up for  [employer] where you can get your results and a message about those results sent to you through the [employer] application. If you do not have mychart we will call you with your results but it may take longer.    Regardless of if you have been tested or not:  Patient who have symptoms (cough, fever, or shortness of breath), need to isolate for 7 days from when symptoms started OR 72 hours after fever resolves (without fever reducing medications) AND improvement of respiratory symptoms (whichever is longer).      Isolate yourself at home (in own room/own bathroom if possible)    Do Not allow any visitors    Do Not go to work or school    Do Not go to [employer],  centers, shopping, or other public places.    Do Not shake hands.    Avoid close and intimate contact with others (hugging, kissing).    Follow CDC recommendations for household cleaning of frequently touched services.     After the initial 7 days, continue to isolate yourself from household members as much as possible. To continue decrease the risk of community spread and exposure, you and any members of your household should limit activities in public for 14 days after starting home isolation.     You can reference the following CDC link for helpful home isolation/care tips:  https://www.cdc.gov/coronavirus/2019-ncov/downloads/10Things.pdf    Protect Others:    Cover Your Mouth and Nose with a mask, disposable tissue or wash cloth to avoid spreading germs to others.    Wash your hands and face frequently with soap and water    Call Back If: Breathing difficulty develops or you become worse.    For more information about COVID19 and options for caring for yourself at home, please visit the CDC website at https://www.cdc.gov/coronavirus/2019-ncov/about/steps-when-sick.html  For more  options for care at [facility], please visit our website at [email or web address]    Patient Education     Viral Bronchitis (Adult)    You have a viral bronchitis. Bronchitis is inflammation and swelling of the lining of the lungs. This is often caused by an infection. Symptoms include a dry, hacking cough that is worse at night. The cough may bring up yellow-green mucus. You may also feel short of breath or wheeze. Other symptoms may include tiredness, chest discomfort, and chills.  Bronchitis that is caused by a virus is not treated with antibiotics. Instead, medicines may be given to help relieve symptoms. Symptoms can last up to 2 weeks, although the cough may last much longer.  This illness is contagious during the first few days and is spread through the air by coughing and sneezing, or by direct contact (touching the sick person and then touching your own eyes, nose, or mouth).  Most viral illnesses resolve within 10 to 14 days with rest and simple home remedies, although they may sometimes last for several weeks.  Home care    If symptoms are severe, rest at home for the first 2 to 3 days. When you go back to your usual activities, don't let yourself get too tired.    Do not smoke. Also avoid being exposed to secondhand smoke.    You may use over-the-counter medicine to control fever or pain, unless another pain medicine was prescribed. If you have chronic liver or kidney disease or have ever had a stomach ulcer or gastrointestinal bleeding, talk with your healthcare provider before using these medicines. Also talk to your provider if you are taking medicine to prevent blood clots. Aspirin should never be given to anyone younger than 18 years of age who is ill with a viral infection or fever. It may cause severe liver or brain damage.    Your appetite may be poor, so a light diet is fine. Avoid dehydration by drinking 6 to 8 glasses of fluids per day (such as water, soft  drinks, sports drinks, juices, tea, or soup). Extra fluids will help loosen secretions in the nose and lungs.    Over-the-counter cough, cold, and sore-throat medicines will not shorten the length of the illness, but they may help to reduce symptoms. Don't use decongestants if you have high blood pressure.  Follow-up care  Follow up with your healthcare provider, or as advised. If you had an X-ray or ECG (electrocardiogram), a specialist will review it. You will be notified of any new findings that may affect your care.  If you are age 65 or older, or if you have a chronic lung disease or condition that affects your immune system, or you smoke, ask your healthcare provider about getting a pneumococcal vaccine and a yearly flu shot (influenza vaccine).  When to seek medical advice  Call your healthcare provider right away if any of these occur:    Fever of 100.4 F (38 C) or higher, or as directed by your healthcare provider    Coughing up increased amounts of colored sputum    Weakness, drowsiness, headache, facial pain, ear pain, or a stiff neck  Call 911  Call 911 if any of these occur:    Coughing up blood    Worsening weakness, drowsiness, headache, or stiff neck    Trouble breathing, wheezing, or pain with breathing  Date Last Reviewed: 6/1/2018 2000-2019 [employer]. [address] [place] [address]. All rights reserved. This information is not intended as a substitute for professional medical care. Always follow your healthcare professional's instructions.           Thank you for choosing us for your care. I have placed an order for a prescription so that you can start treatment. View your full visit summary for details by clicking on the link below. Your pharmacist will able to address any questions you may have about the medication.     If you're not feeling better within 5-7 days, please schedule an appointment.  You can schedule an appointment right here in [facility], or call  [phone]  If the visit is for the same symptoms as your e-visit, we'll refund the cost of your e-visit if seen within seven days.

## 2020-08-12 NOTE — TELEPHONE ENCOUNTER
Spoke with  who set up an appointment-Pt needs . Appointment set and pt notified via [employer]. Radha Arguello RN   spouse

## 2020-09-28 ENCOUNTER — TELEPHONE (OUTPATIENT)
Dept: FAMILY MEDICINE | Facility: CLINIC | Age: 40
End: 2020-09-28

## 2020-09-28 NOTE — LETTER
[facility]  [address] [place] [address]  [phone]      September 28, 2020    Santo Toure                                                                                                                     [address] [address] [address]            Dear Santo,    At [facility] we care about your health and well-being. A review of your chart has indicated that you are due for an Asthma Control Test. For copyright reasons we have attached a copy of the questionnaire. Please complete the questions and mail them back to the clinic in the provided envelope.    You may contact the clinic at [phone] if you have any questions or concerns about this request.        Sincerely,       [facility] Care Staff/ ss

## 2020-09-28 NOTE — TELEPHONE ENCOUNTER
Panel Management Review      Patient has the following on his problem list:     Asthma review     ACT Total Scores 1/9/2020   ACT TOTAL SCORE (Goal Greater than or Equal to 20) 15   In the past 12 months, how many times did you visit the emergency room for your asthma without being admitted to the hospital? 0   In the past 12 months, how many times were you hospitalized overnight because of your asthma? 0      1. Is Asthma diagnosis on the Problem List? Yes    2. Is Asthma listed on Health Maintenance? Yes    3. Patient is due for:  ACT      Composite cancer screening  Chart review shows that this patient is due/due soon for the following None  Summary:    Patient is due/failing the following:   ACT    Action needed:   Patient needs to do ACT.    Type of outreach:    Copy of ACT mailed to patient, will reach out in 5 days.    Questions for provider review:    None                                                                                                                                    Cate Guo MA      Chart routed to Care Team .

## 2020-11-26 ENCOUNTER — HOSPITAL ENCOUNTER (EMERGENCY)
Facility: CLINIC | Age: 40
Discharge: HOME | End: 2020-11-26
Attending: EMERGENCY MEDICINE | Admitting: EMERGENCY MEDICINE

## 2020-11-26 VITALS
WEIGHT: 230 LBS | TEMPERATURE: 97.5 F | HEIGHT: 72 IN | OXYGEN SATURATION: 97 % | RESPIRATION RATE: 16 BRPM | HEART RATE: 61 BPM | BODY MASS INDEX: 31.15 KG/M2

## 2020-11-26 DIAGNOSIS — S39.012A STRAIN OF LUMBAR REGION, INITIAL ENCOUNTER: ICD-10-CM

## 2020-11-26 PROCEDURE — 99284 EMERGENCY DEPT VISIT MOD MDM: CPT | Performed by: EMERGENCY MEDICINE

## 2020-11-26 PROCEDURE — 99282 EMERGENCY DEPT VISIT SF MDM: CPT | Performed by: EMERGENCY MEDICINE

## 2020-11-26 RX ORDER — CYCLOBENZAPRINE HCL 10 MG
10 TABLET ORAL 3 TIMES DAILY PRN
Qty: 10 TABLET | Refills: 0 | Status: SHIPPED | OUTPATIENT
Start: 2020-11-26 | End: 2020-12-02

## 2020-11-26 ASSESSMENT — MIFFLIN-ST. JEOR: SCORE: 1991.27

## 2020-11-26 NOTE — ED PROVIDER NOTES
Visit is accomplished with    History     Chief Complaint   Patient presents with     Back Pain     HPI  Santo Toure is a 40 year old male who presents with right-sided low back pain.  Pain described as sharp, worse with movement, nonradiating, has not had such pain in the past, denies inciting trauma or strain.  Denies weakness in his legs, saddle anesthesia, change in bowel or bladder.  Is tried some ibuprofen with transient moderate relief.  No associated abdominal pain, nausea vomiting, fever urinary symptoms or diarrhea.  No prior abdominal surgery.  History of kidney stone and fatty liver was commented on last imaging 3/20.  Non-smoker occasional alcohol.    Allergies:  Allergies   Allergen Reactions     Percocet [Oxycodone-Acetaminophen] Nausea and Vomiting     Penicillins Other (See Comments) and Rash     Vomiting as a infant       Problem List:    Patient Active Problem List    Diagnosis Date Noted     Mild intermittent asthma with acute exacerbation 01/09/2020     Priority: Medium     Pyelonephritis 10/07/2017     Priority: Medium     Complicated UTI (urinary tract infection) 10/07/2017     Priority: Medium     Kidney stone 09/13/2017     Priority: Medium        Past Medical History:    Past Medical History:   Diagnosis Date     Asthma      Deaf      Kidney stone 09/14/2017     PONV (postoperative nausea and vomiting)        Past Surgical History:    Past Surgical History:   Procedure Laterality Date     CYSTOSCOPY, RETROGRADES, INSERT STENT URETER(S), COMBINED Right 9/14/2017    Procedure: COMBINED CYSTOSCOPY, RETROGRADES, INSERT STENT URETER(S);  Cysto , right stent placement;  Surgeon: Pavel Lundy MD;  Location: [facility] OR     LASER HOLMIUM LITHOTRIPSY URETER(S), INSERT STENT, COMBINED Right 10/5/2017    Procedure: COMBINED CYSTOSCOPY, URETEROSCOPY, LASER HOLMIUM LITHOTRIPSY URETER(S), INSERT STENT;  Cystoscopy, right ureteroscopy, laser lithotripsy, stent  exchange;  Surgeon: Pavel Lundy MD;  Location: [facility] OR       Family History:    Family History   Problem Relation Age of Onset     Heart Disease Mother         MI, also frequent kidney infections       Social History:  Marital Status:  Single [1]  Social History     Tobacco Use     Smoking status: Never Smoker     Smokeless tobacco: Never Used   Substance Use Topics     Alcohol use: Yes     Comment: on occasion     Drug use: No        Medications:         cyclobenzaprine (FLEXERIL) 10 MG tablet       acetaminophen (TYLENOL) 500 MG tablet       albuterol (PROAIR HFA/PROVENTIL HFA/VENTOLIN HFA) 108 (90 Base) MCG/ACT inhaler       IBUPROFEN PO          Review of Systems  All other systems reviewed and are negative.    Physical Exam   Pulse: 61  Temp: 97.5  F (36.4  C)  Resp: 16  Height: 182.9 cm (6')  Weight: 104.3 kg (230 lb)  SpO2: 97 %      Physical Exam  Nontoxic appearing no respiratory distress alert and oriented ×3  Head atraumatic normocephalic  Neck supple full active painless range of motion  Lungs clear to auscultation  Heart regular no murmur  Abdomen soft nontender bowel sounds positive   Right low paralumbar/right proximal sacral tenderness reproduces pain complaint  Strength and sensation grossly intact throughout the extremities, gait and station normal  Speech is fluent, good eye contact, thought processes are rational  Lower extremities without swelling, redness or tenderness  Pedal pulses symmetrical and strong    ED Course        Procedures               Critical Care time:  none               No results found for this or any previous visit (from the past 24 hour(s)).    Medications - No data to display    Assessments & Plan (with Medical Decision Making)  Atraumatic low back strain, right paralumbar/sacral, no red flags by history or exam.  Watchful waiting appropriate, Tylenol ibuprofen for discomfort, return criteria reviewed.  Prescription for Flexeril warned her guarding side  effects.     I have reviewed the nursing notes.    I have reviewed the findings, diagnosis, plan and need for follow up with the patient.       Discharge Medication List as of 11/26/2020 11:58 AM      START taking these medications    Details   cyclobenzaprine (FLEXERIL) 10 MG tablet Take 1 tablet (10 mg) by mouth 3 times daily as needed for muscle spasms, Disp-10 tablet, R-0, Local Print             Final diagnoses:   Strain of lumbar region, initial encounter       11/26/2020   [facility] EMERGENCY DEPT     Krunal Apple MD  11/26/20 [id number]

## 2020-11-26 NOTE — ED AVS SNAPSHOT
[facility] Emergency Dept  [address] [address]  Phone: [phone]  Fax: [phone]                                    Santo Toure   MRN: [id number]    Department: [facility] Emergency Dept   Date of Visit: 11/26/2020           After Visit Summary Signature Page    I have received my discharge instructions, and my questions have been answered. I have discussed any challenges I see with this plan with the nurse or doctor.    ..........................................................................................................................................  Patient/Patient Representative Signature      ..........................................................................................................................................  Patient Representative Print Name and Relationship to Patient    ..................................................               ................................................  Date                                   Time    ..........................................................................................................................................  Reviewed by Signature/Title    ...................................................              ..............................................  Date                                               Time          22EPIC Rev 08/18

## 2020-11-26 NOTE — ED NOTES
Patient roomed and  logged into the I-pad. The MD made aware of the .   Patient comfortable with I-pad , and declined need for in person .

## 2020-12-07 ENCOUNTER — TELEPHONE (OUTPATIENT)
Dept: FAMILY MEDICINE | Facility: CLINIC | Age: 40
End: 2020-12-07

## 2020-12-07 NOTE — TELEPHONE ENCOUNTER
Panel Management Review      Patient has the following on his problem list:     Asthma review     ACT Total Scores 1/9/2020   ACT TOTAL SCORE (Goal Greater than or Equal to 20) 15   In the past 12 months, how many times did you visit the emergency room for your asthma without being admitted to the hospital? 0   In the past 12 months, how many times were you hospitalized overnight because of your asthma? 0      1. Is Asthma diagnosis on the Problem List? Yes    2. Is Asthma listed on Health Maintenance? Yes    3. Patient is due for:  ACT      Composite cancer screening  Chart review shows that this patient is due/due soon for the following None  Summary:    Patient is due/failing the following:   ACT    Action needed:   Patient needs to do ACT.    Type of outreach:    Phone, left message for patient to call back.     Questions for provider review:    None                                                                                                                                    Cate Guo MA      Chart routed to Care Team .

## 2021-01-15 ENCOUNTER — HEALTH MAINTENANCE LETTER (OUTPATIENT)
Age: 41
End: 2021-01-15

## 2021-05-03 ENCOUNTER — IMMUNIZATION (OUTPATIENT)
Dept: PEDIATRICS | Facility: CLINIC | Age: 41
End: 2021-05-03
Payer: COMMERCIAL

## 2021-05-03 PROCEDURE — 0001A PR COVID VAC PFIZER DIL RECON 30 MCG/0.3 ML IM: CPT

## 2021-05-03 PROCEDURE — 91300 PR COVID VAC PFIZER DIL RECON 30 MCG/0.3 ML IM: CPT

## 2021-05-24 ENCOUNTER — IMMUNIZATION (OUTPATIENT)
Dept: PEDIATRICS | Facility: CLINIC | Age: 41
End: 2021-05-24
Attending: INTERNAL MEDICINE
Payer: COMMERCIAL

## 2021-05-24 PROCEDURE — 0002A PR COVID VAC PFIZER DIL RECON 30 MCG/0.3 ML IM: CPT

## 2021-05-24 PROCEDURE — 91300 PR COVID VAC PFIZER DIL RECON 30 MCG/0.3 ML IM: CPT

## 2021-05-24 PROCEDURE — T1013 SIGN LANG/ORAL INTERPRETER: HCPCS | Mod: U3 | Performed by: INTERNAL MEDICINE

## 2021-08-04 NOTE — ED AVS SNAPSHOT
[facility] Emergency Department    [address] [address]    Phone:  [phone]    Fax:  [phone]                                       Santo Toure   MRN: [id number]    Department:  [facility] Emergency Department   Date of Visit:  2/20/2018           Patient Information     Date Of Birth          1980        Your diagnoses for this visit were:     Acute chest pain Probable musculoskeletal chest wall pain       You were seen by Julio Teague MD.      Follow-up Information     Schedule an appointment as soon as possible for a visit with Clinic, [facility].    Contact information:    [address] [place] [address] [id number] [phone]          Follow up with [facility] Emergency Department.    Specialty:  EMERGENCY MEDICINE    Why:  If symptoms worsen, or for new problems or concerns.    Contact information:    [address] [place] [address] [phone]    Additional information:    The medical center is located at   [address] [place] [address] (between [facility] and   [address] in [place], four miles north   of [place]).        Discharge Instructions         *CHEST PAIN, NONCARDIAC    Based on your visit today, the exact cause of your chest pain is not certain. Your condition does not seem serious and your pain does not appear to be coming from your heart. However, sometimes the signs of a serious problem take more time to appear. Therefore, please watch for the warning signs listed below.  HOME CARE:  1. Rest today and avoid strenuous activity.  2. Take any prescribed medicine as directed.  FOLLOW UP with your doctor in 1-3 days.   GET PROMPT MEDICAL ATTENTION if any of the following occur:    A change in the type of pain: if it feels different, becomes more severe, lasts longer, or begins to spread into your shoulder, arm, neck, jaw or back    Shortness of breath or increased pain with breathing    Cough with blood or dark colored sputum  Mother called medical records and was directed to call PCP.    Mother is requesting that a copy of last physical be faxed to patients school.    Mother did not have the fax number and will call back with the information.    CALL CENTER - PLEASE ENTER NAME OF SCHOOL AND FAX NUMBER AND ROUTE MESSAGE TO PCP POOL.   (phlegm)    Weakness, dizziness, or fainting    Fever over 101  F (38.3  C)    Swelling, pain or redness in one leg    6132-1688 [employer]. [address] [place] [address], [place] [address]. All rights reserved. This information is not intended as a substitute for professional medical care. Always follow your healthcare professional's instructions.  This information has been modified by your health care provider with permission from the publisher.      Discharge References/Attachments     CHEST WALL STRAIN (CHILD) (ENGLISH)    CHEST WALL PAIN, COSTOCHONDRITIS (ENGLISH)      24 Hour Appointment Hotline       To make an appointment at any [facility], call [phone] ([phone]). If you don't have a family doctor or clinic, we will help you find one. [facility] clinics are conveniently located to serve the needs of you and your family.             Review of your medicines      Our records show that you are taking the medicines listed below. If these are incorrect, please call your family doctor or clinic.        Dose / Directions Last dose taken    acetaminophen 500 MG tablet   Commonly known as:  TYLENOL   Dose:  1000 mg   Quantity:  1 Bottle        Take 2 tablets (1,000 mg) by mouth every 6 hours as needed for mild pain or fever   Refills:  0        cyclobenzaprine 10 MG tablet   Commonly known as:  FLEXERIL   Dose:  5-10 mg   Quantity:  30 tablet        Take 0.5-1 tablets (5-10 mg) by mouth 3 times daily as needed for muscle spasms   Refills:  1        IBUPROFEN PO   Dose:  400-600 mg        Take 400-600 mg by mouth every 6 hours as needed for moderate pain   Refills:  0        ketorolac 10 MG tablet   Commonly known as:  TORADOL   Dose:  10 mg   Quantity:  20 tablet        Take 1 tablet (10 mg) by mouth every 6 hours as needed for moderate pain   Refills:  0                Procedures and tests performed during your visit     CBC with platelets, differential    Comprehensive metabolic panel     EKG 12 lead    Lipase    Troponin I    XR Chest 2 Views      Orders Needing Specimen Collection     None      Pending Results     No orders found from 2/18/2018 to 2/21/2018.            Pending Culture Results     No orders found from 2/18/2018 to 2/21/2018.            Pending Results Instructions     If you had any lab results that were not finalized at the time of your Discharge, you can call the ED Lab Result RN at [phone]. You will be contacted by this team for any positive Lab results or changes in treatment. The nurses are available 7 days a week from 10A to 6:30P.  You can leave a message 24 hours per day and they will return your call.        Test Results From Your Hospital Stay        2/20/2018  7:29 PM      Component Results     Component Value Ref Range & Units Status    WBC 10.0 4.0 - 11.0 10e9/L Final    RBC Count 5.47 4.4 - 5.9 10e12/L Final    Hemoglobin 14.9 13.3 - 17.7 g/dL Final    Hematocrit 43.9 40.0 - 53.0 % Final    MCV 80 78 - 100 fl Final    MCH 27.2 26.5 - 33.0 pg Final    MCHC 33.9 31.5 - 36.5 g/dL Final    RDW 13.0 10.0 - 15.0 % Final    Platelet Count 191 150 - 450 10e9/L Final    Diff Method Automated Method  Final    % Neutrophils 62.5 % Final    % Lymphocytes 27.1 % Final    % Monocytes 6.8 % Final    % Eosinophils 2.9 % Final    % Basophils 0.5 % Final    % Immature Granulocytes 0.2 % Final    Absolute Neutrophil 6.2 1.6 - 8.3 10e9/L Final    Absolute Lymphocytes 2.7 0.8 - 5.3 10e9/L Final    Absolute Monocytes 0.7 0.0 - 1.3 10e9/L Final    Absolute Eosinophils 0.3 0.0 - 0.7 10e9/L Final    Absolute Basophils 0.1 0.0 - 0.2 10e9/L Final    Abs Immature Granulocytes 0.0 0 - 0.4 10e9/L Final         2/20/2018  7:34 PM      Component Results     Component Value Ref Range & Units Status    Sodium 140 133 - 144 mmol/L Final    Potassium 3.5 3.4 - 5.3 mmol/L Final    Chloride 104 94 - 109 mmol/L Final    Carbon Dioxide 32 20 - 32 mmol/L Final    Anion Gap 4 3 - 14 mmol/L Final     Glucose 74 70 - 99 mg/dL Final    Urea Nitrogen 9 7 - 30 mg/dL Final    Creatinine 0.69 0.66 - 1.25 mg/dL Final    GFR Estimate >90 >60 mL/min/1.7m2 Final    Non  GFR Calc    GFR Estimate If Black >90 >60 mL/min/1.7m2 Final    African American GFR Calc    Calcium 9.1 8.5 - 10.1 mg/dL Final    Bilirubin Total 0.2 0.2 - 1.3 mg/dL Final    Albumin 4.0 3.4 - 5.0 g/dL Final    Protein Total 7.8 6.8 - 8.8 g/dL Final    Alkaline Phosphatase 81 40 - 150 U/L Final    ALT 30 0 - 70 U/L Final    AST 16 0 - 45 U/L Final         2/20/2018  7:34 PM      Component Results     Component Value Ref Range & Units Status    Lipase 137 73 - 393 U/L Final         2/20/2018  7:34 PM      Component Results     Component Value Ref Range & Units Status    Troponin I ES <0.015 0.000 - 0.045 ug/L Final    The 99th percentile for upper reference range is 0.045 ug/L.  Troponin values   in the range of 0.045 - 0.120 ug/L may be associated with risks of adverse   clinical events.           2/20/2018  8:30 PM      Narrative     CHEST TWO VIEWS   2/20/2018 7:40 PM     HISTORY: Pleuritic anterior chest pain.    COMPARISON: 1/12/2017.        Impression     IMPRESSION: Lung volumes are slightly low. There is a streaky opacity  at the left lung base only seen on the frontal view that may represent  atelectasis. No significant pleural effusion or pneumothorax. Cardiac  silhouette within normal limits.    PHILOMENA QUINTANILLA MD                Thank you for choosing [facility]       Thank you for choosing [facility] for your care. Our goal is always to provide you with excellent care. Hearing back from our patients is one way we can continue to improve our services. Please take a few minutes to complete the written survey that you may receive in the mail after you visit with us. Thank you!        [employer] Information     [facility] gives you secure access to your electronic health record. If you see a primary care provider, you can also send messages to  your care team and make appointments. If you have questions, please call your primary care clinic.  If you do not have a primary care provider, please call [phone] and they will assist you.        Care EveryWhere ID     This is your Care EveryWhere ID. This could be used by other organizations to access your [facility] medical records  [id number]        Equal Access to Services     ANTONINA ALBA : Drew Victoria, krista magaña, mookie aviles . So [facility] [phone].    ATENCIÓN: Si habla español, tiene a marshall disposición servicios gratuitos de asistencia lingüística. Llame al [phone].    We comply with applicable federal civil rights laws and [place] laws. We do not discriminate on the basis of race, color, national origin, age, disability, sex, sexual orientation, or gender identity.            After Visit Summary       This is your record. Keep this with you and show to your community pharmacist(s) and doctor(s) at your next visit.

## 2021-09-04 ENCOUNTER — HEALTH MAINTENANCE LETTER (OUTPATIENT)
Age: 41
End: 2021-09-04

## 2021-10-25 ENCOUNTER — OFFICE VISIT (OUTPATIENT)
Dept: URGENT CARE | Facility: URGENT CARE | Age: 41
End: 2021-10-25
Payer: COMMERCIAL

## 2021-10-25 VITALS
RESPIRATION RATE: 18 BRPM | HEART RATE: 67 BPM | OXYGEN SATURATION: 97 % | DIASTOLIC BLOOD PRESSURE: 82 MMHG | WEIGHT: 240 LBS | TEMPERATURE: 98.6 F | SYSTOLIC BLOOD PRESSURE: 138 MMHG | BODY MASS INDEX: 32.55 KG/M2

## 2021-10-25 DIAGNOSIS — N50.811 PAIN IN BOTH TESTICLES: Primary | ICD-10-CM

## 2021-10-25 DIAGNOSIS — M54.50 LOW BACK PAIN, UNSPECIFIED BACK PAIN LATERALITY, UNSPECIFIED CHRONICITY, UNSPECIFIED WHETHER SCIATICA PRESENT: ICD-10-CM

## 2021-10-25 DIAGNOSIS — N50.812 PAIN IN BOTH TESTICLES: Primary | ICD-10-CM

## 2021-10-25 LAB
ALBUMIN UR-MCNC: NEGATIVE MG/DL
APPEARANCE UR: CLEAR
BILIRUB UR QL STRIP: NEGATIVE
COLOR UR AUTO: YELLOW
GLUCOSE UR STRIP-MCNC: NEGATIVE MG/DL
HGB UR QL STRIP: NEGATIVE
KETONES UR STRIP-MCNC: NEGATIVE MG/DL
LEUKOCYTE ESTERASE UR QL STRIP: NEGATIVE
NITRATE UR QL: NEGATIVE
PH UR STRIP: 6 [PH] (ref 5–7)
SP GR UR STRIP: 1.01 (ref 1–1.03)
UROBILINOGEN UR STRIP-ACNC: 0.2 E.U./DL

## 2021-10-25 PROCEDURE — 99213 OFFICE O/P EST LOW 20 MIN: CPT | Performed by: FAMILY MEDICINE

## 2021-10-25 PROCEDURE — 81003 URINALYSIS AUTO W/O SCOPE: CPT | Performed by: FAMILY MEDICINE

## 2021-10-25 RX ORDER — CYCLOBENZAPRINE HCL 10 MG
10 TABLET ORAL 3 TIMES DAILY PRN
Qty: 21 TABLET | Refills: 0 | Status: SHIPPED | OUTPATIENT
Start: 2021-10-25 | End: 2021-11-01

## 2021-10-25 NOTE — PROGRESS NOTES
S: Very pleasant hearing impaired 41-year-old gentleman presents today with right lower back pain for 4 days in duration of mild intensity.  He also notes slight testicular pain.  ROS: Negative for fever, negative paresthesias distally.  Negative for dysuria.  Negative hematuria.  Negative for trauma.  PMH notable for nephrolithiasis in 2017.  SH: He works and was at work today as well.    Meds: Tylenol, albuterol as needed, ibuprofen as needed    O: Blood pressure 138/82, temperature 98.6, pulse 67 respiration 18  Alert conversant no acute distress  Through  we found that he has no pain in the midline of the spine.  There is only pain in the right lower sacroiliac region.  Examination external genitalia shows testicles normal size shape consistency external genitalia normal no evidence of hernia.  Neuro-we will for extremities pupils are equal he is able to get up and down out of the chair and ambulate easily.    A: Skeletal back pain    P: Flexeril 10 mg 3 times daily and he was warned not to drive with medication  Over-the-counter analgesics for pain  Recommended physical therapy patient declined at this time  Follow-up if not improving

## 2021-12-13 ENCOUNTER — OFFICE VISIT (OUTPATIENT)
Dept: URGENT CARE | Facility: URGENT CARE | Age: 41
End: 2021-12-13
Payer: COMMERCIAL

## 2021-12-13 VITALS
DIASTOLIC BLOOD PRESSURE: 90 MMHG | RESPIRATION RATE: 18 BRPM | TEMPERATURE: 98.5 F | HEART RATE: 70 BPM | OXYGEN SATURATION: 98 % | WEIGHT: 240 LBS | SYSTOLIC BLOOD PRESSURE: 124 MMHG | BODY MASS INDEX: 32.55 KG/M2

## 2021-12-13 DIAGNOSIS — J02.9 SORE THROAT: ICD-10-CM

## 2021-12-13 DIAGNOSIS — R03.0 ELEVATED BP WITHOUT DIAGNOSIS OF HYPERTENSION: ICD-10-CM

## 2021-12-13 DIAGNOSIS — J06.9 VIRAL URI WITH COUGH: Primary | ICD-10-CM

## 2021-12-13 LAB
DEPRECATED S PYO AG THROAT QL EIA: NEGATIVE
SARS-COV-2 RNA RESP QL NAA+PROBE: NORMAL

## 2021-12-13 PROCEDURE — 87651 STREP A DNA AMP PROBE: CPT | Performed by: PHYSICIAN ASSISTANT

## 2021-12-13 PROCEDURE — U0003 INFECTIOUS AGENT DETECTION BY NUCLEIC ACID (DNA OR RNA); SEVERE ACUTE RESPIRATORY SYNDROME CORONAVIRUS 2 (SARS-COV-2) (CORONAVIRUS DISEASE [COVID-19]), AMPLIFIED PROBE TECHNIQUE, MAKING USE OF HIGH THROUGHPUT TECHNOLOGIES AS DESCRIBED BY CMS-2020-01-R: HCPCS | Performed by: PHYSICIAN ASSISTANT

## 2021-12-13 PROCEDURE — 99213 OFFICE O/P EST LOW 20 MIN: CPT | Performed by: PHYSICIAN ASSISTANT

## 2021-12-13 RX ORDER — ALBUTEROL SULFATE 90 UG/1
AEROSOL, METERED RESPIRATORY (INHALATION)
Qty: 18 G | Refills: 0 | Status: SHIPPED | OUTPATIENT
Start: 2021-12-13 | End: 2023-09-15

## 2021-12-13 NOTE — PATIENT INSTRUCTIONS
Monitor blood pressure at home and if average pressure is greater than or equal to 140/90 follow up with primary care provider

## 2021-12-13 NOTE — PROGRESS NOTES
Assessment & Plan     Viral URI with cough  Rapid strep negative. COVID pending. Continue with supportive care. Get plenty of rest and push fluids. Can use Tylenol and/or ibuprofen as needed for pain and/or fever control. Discussed quarantine guidelines. Return to clinic if symptoms worsen or do not improve; otherwise follow up as needed       - albuterol (PROAIR HFA/PROVENTIL HFA/VENTOLIN HFA) 108 (90 Base) MCG/ACT inhaler; Inhale 2 puffs every 4-6 hours as needed for cough, wheezing, or shortness of breath    Sore throat    - Streptococcus A Rapid Screen w/Reflex to PCR - Clinic Collect  - Symptomatic COVID-19 Virus (Coronavirus) by PCR Nose  - Group A Streptococcus PCR Throat Swab  - Symptomatic COVID-19 Virus (Coronavirus) by PCR Nose    Elevated BP without diagnosis of hypertension  Monitor blood pressure at home and if average pressure is greater than or equal to 140/90 follow up with primary care provider                     Return in about 1 week (around 12/20/2021), or if symptoms worsen or fail to improve.    Brittney Levi PA-C  [facility] URGENT CARE [place]              Subjective   Chief Complaint   Patient presents with     Pharyngitis     12/10/21 Patient has a sore throat, congestion in chest headache, diarrhea, and cough. Taking ibuprofen.      URI         HPI       URI Adult    Onset of symptoms was 3 day(s) ago.  Course of illness is same.    Severity moderate  Current and Associated symptoms: cough, congestion, sore throat  Treatment measures tried include Tylenol/Ibuprofen.  Predisposing factors include intermittent asthma   Was vaccinated,   Has not had COVID               Review of Systems   Constitutional, HEENT, cardiovascular, pulmonary, gi and gu systems are negative, except as otherwise noted.      Objective    BP (!) 124/90 (BP Location: Right arm, Patient Position: Sitting, Cuff Size: Adult Large)   Pulse 70   Temp 98.5  F (36.9  C) (Tympanic)   Resp 18   Wt 108.9  kg (240 lb)   SpO2 98%   BMI 32.55 kg/m    Body mass index is 32.55 kg/m .  Physical Exam  Constitutional:       General: He is not in acute distress.     Appearance: He is well-developed.   HENT:      Head: Normocephalic and atraumatic.      Right Ear: Tympanic membrane and ear canal normal.      Left Ear: Tympanic membrane and ear canal normal.   Eyes:      Conjunctiva/sclera: Conjunctivae normal.   Cardiovascular:      Rate and Rhythm: Normal rate and regular rhythm.   Pulmonary:      Effort: Pulmonary effort is normal.      Breath sounds: Normal breath sounds.   Skin:     General: Skin is warm and dry.      Findings: No rash.   Psychiatric:         Behavior: Behavior normal.

## 2021-12-13 NOTE — LETTER
[facility] CARE [place]  [address] [address]  Phone: [phone]  Fax: [phone]    December 13, 2021        Santo Toure  [address] [address]          To whom it may concern:    RE: Santo Toure    Patient was seen and treated today at our clinic and missed work 12/13/21 through 12/15/21.    Please contact me for questions or concerns.      Sincerely,        Brittney Levi PA-C

## 2021-12-14 LAB
GROUP A STREP BY PCR: NOT DETECTED
SARS-COV-2 RNA RESP QL NAA+PROBE: NOT DETECTED

## 2022-01-17 ENCOUNTER — OFFICE VISIT (OUTPATIENT)
Dept: FAMILY MEDICINE | Facility: CLINIC | Age: 42
End: 2022-01-17
Payer: COMMERCIAL

## 2022-01-17 VITALS
BODY MASS INDEX: 35.15 KG/M2 | RESPIRATION RATE: 20 BRPM | WEIGHT: 259.2 LBS | OXYGEN SATURATION: 99 % | SYSTOLIC BLOOD PRESSURE: 132 MMHG | HEART RATE: 72 BPM | TEMPERATURE: 97.7 F | DIASTOLIC BLOOD PRESSURE: 88 MMHG

## 2022-01-17 DIAGNOSIS — M25.512 ACUTE PAIN OF LEFT SHOULDER: Primary | ICD-10-CM

## 2022-01-17 PROCEDURE — 99213 OFFICE O/P EST LOW 20 MIN: CPT | Performed by: PHYSICIAN ASSISTANT

## 2022-01-17 PROCEDURE — T1013 SIGN LANG/ORAL INTERPRETER: HCPCS | Mod: U3 | Performed by: PHYSICIAN ASSISTANT

## 2022-01-17 ASSESSMENT — PAIN SCALES - GENERAL: PAINLEVEL: WORST PAIN (10)

## 2022-01-17 NOTE — PROGRESS NOTES
Assessment & Plan   Acute pain of left shoulder  Patient has tenderness at the site of his COVID booster shot. On exam there is a small lump underneath the skin. Suspect that this is related to a calcified hematoma as this has been present for over a month. Symptoms have been improving. No evidence of abscess or cellulitis based on exam. Recommended warm compresses over the area. Patient has been in his otherwise usual state of health without any other concerning signs or symptoms.     Return in about 3 months (around 4/17/2022), or if symptoms worsen or fail to improve, for In-Clinic Visit.    CAROLIN [facility]    Subjective   Santo is a 41 year old who presents for the following health issues     HPI     Concern - Shoulder Injury   Onset: December 4th   Description: Patient states that his arm/shoulder has been hurting ever since receiving his covid booster shot. Says that he has a hard time moving/using the arm   Intensity: moderate to  severe  Progression of Symptoms:  same  Accompanying Signs & Symptoms: none  Previous history of similar problem: na   Precipitating factors:        Worsened by: moving certain ways and lifting   Alleviating factors:        Improved by: na   Therapies tried and outcome: ibuprofen, ice     Review of Systems   See HPI       Objective    /88 (BP Location: Right arm, Patient Position: Sitting, Cuff Size: Adult Large)   Pulse 72   Temp 97.7  F (36.5  C) (Tympanic)   Resp 20   Wt 117.6 kg (259 lb 3.2 oz)   SpO2 99%   BMI 35.15 kg/m    Body mass index is 35.15 kg/m .  Physical Exam   Constitutional: healthy, alert, and no distress  Head: Normocephalic. Atraumatic  Eyes: No conjunctival injection, sclera anicteric  Respiratory: No resp distress.  Musculoskeletal: extremities normal- no gross deformities noted, and normal muscle tone  Neurologic: Gait normal. CN 2-12 grossly intact  Psychiatric: mentation appears normal and affect  normal/bright   Skin: There is no overlying erythema, swelling, fluctuance, discharge or drainage of the left shoulder. There is a small circular lump over the left deltoid muscle. This is tender.

## 2022-01-18 ASSESSMENT — ASTHMA QUESTIONNAIRES: ACT_TOTALSCORE: 21

## 2022-01-19 NOTE — PATIENT INSTRUCTIONS
I think this is a hematoma or a persistent injection site reaction. I recommend using warm compresses and ibuprofen for pain. If for some reason symptoms persist or get a lot worse please make sure to return to clinic

## 2022-02-19 ENCOUNTER — HEALTH MAINTENANCE LETTER (OUTPATIENT)
Age: 42
End: 2022-02-19

## 2022-10-16 ENCOUNTER — HEALTH MAINTENANCE LETTER (OUTPATIENT)
Age: 42
End: 2022-10-16

## 2022-10-17 ENCOUNTER — OFFICE VISIT (OUTPATIENT)
Dept: FAMILY MEDICINE | Facility: CLINIC | Age: 42
End: 2022-10-17
Payer: COMMERCIAL

## 2022-10-17 VITALS
RESPIRATION RATE: 20 BRPM | WEIGHT: 243.4 LBS | HEART RATE: 66 BPM | HEIGHT: 70 IN | DIASTOLIC BLOOD PRESSURE: 92 MMHG | TEMPERATURE: 97 F | BODY MASS INDEX: 34.84 KG/M2 | SYSTOLIC BLOOD PRESSURE: 132 MMHG

## 2022-10-17 DIAGNOSIS — Z13.220 SCREENING FOR HYPERLIPIDEMIA: Primary | ICD-10-CM

## 2022-10-17 DIAGNOSIS — F41.1 GENERALIZED ANXIETY DISORDER: ICD-10-CM

## 2022-10-17 DIAGNOSIS — I10 ESSENTIAL HYPERTENSION: ICD-10-CM

## 2022-10-17 LAB
ALBUMIN SERPL BCG-MCNC: 4.6 G/DL (ref 3.5–5.2)
ALP SERPL-CCNC: 87 U/L (ref 40–129)
ALT SERPL W P-5'-P-CCNC: 38 U/L (ref 10–50)
ANION GAP SERPL CALCULATED.3IONS-SCNC: 18 MMOL/L (ref 7–15)
AST SERPL W P-5'-P-CCNC: 56 U/L (ref 10–50)
BILIRUB SERPL-MCNC: 0.4 MG/DL
BUN SERPL-MCNC: 11 MG/DL (ref 6–20)
CALCIUM SERPL-MCNC: 9.8 MG/DL (ref 8.6–10)
CHLORIDE SERPL-SCNC: 100 MMOL/L (ref 98–107)
CHOLEST SERPL-MCNC: 190 MG/DL
CREAT SERPL-MCNC: 0.83 MG/DL (ref 0.67–1.17)
DEPRECATED HCO3 PLAS-SCNC: 20 MMOL/L (ref 22–29)
GFR SERPL CREATININE-BSD FRML MDRD: >90 ML/MIN/1.73M2
GLUCOSE SERPL-MCNC: 89 MG/DL (ref 70–99)
HBA1C MFR BLD: 5.5 % (ref 0–5.6)
HDLC SERPL-MCNC: 42 MG/DL
LDLC SERPL CALC-MCNC: 122 MG/DL
NONHDLC SERPL-MCNC: 148 MG/DL
POTASSIUM SERPL-SCNC: 4 MMOL/L (ref 3.4–5.3)
PROT SERPL-MCNC: 7.4 G/DL (ref 6.4–8.3)
SODIUM SERPL-SCNC: 138 MMOL/L (ref 136–145)
TRIGL SERPL-MCNC: 129 MG/DL
TSH SERPL DL<=0.005 MIU/L-ACNC: 2.73 UIU/ML (ref 0.3–4.2)

## 2022-10-17 PROCEDURE — 36415 COLL VENOUS BLD VENIPUNCTURE: CPT | Performed by: PHYSICIAN ASSISTANT

## 2022-10-17 PROCEDURE — 99214 OFFICE O/P EST MOD 30 MIN: CPT | Performed by: PHYSICIAN ASSISTANT

## 2022-10-17 PROCEDURE — 84443 ASSAY THYROID STIM HORMONE: CPT | Performed by: PHYSICIAN ASSISTANT

## 2022-10-17 PROCEDURE — 83036 HEMOGLOBIN GLYCOSYLATED A1C: CPT | Performed by: PHYSICIAN ASSISTANT

## 2022-10-17 PROCEDURE — T1013 SIGN LANG/ORAL INTERPRETER: HCPCS | Mod: U3

## 2022-10-17 PROCEDURE — 80061 LIPID PANEL: CPT | Performed by: PHYSICIAN ASSISTANT

## 2022-10-17 PROCEDURE — 80053 COMPREHEN METABOLIC PANEL: CPT | Performed by: PHYSICIAN ASSISTANT

## 2022-10-17 RX ORDER — BUPROPION HYDROCHLORIDE 75 MG/1
75 TABLET ORAL 2 TIMES DAILY
Qty: 90 TABLET | Refills: 0 | Status: CANCELLED | OUTPATIENT
Start: 2022-10-17

## 2022-10-17 RX ORDER — VENLAFAXINE HYDROCHLORIDE 37.5 MG/1
37.5 CAPSULE, EXTENDED RELEASE ORAL DAILY
Qty: 30 CAPSULE | Refills: 1 | Status: SHIPPED | OUTPATIENT
Start: 2022-10-17 | End: 2022-11-16

## 2022-10-17 ASSESSMENT — PATIENT HEALTH QUESTIONNAIRE - PHQ9
10. IF YOU CHECKED OFF ANY PROBLEMS, HOW DIFFICULT HAVE THESE PROBLEMS MADE IT FOR YOU TO DO YOUR WORK, TAKE CARE OF THINGS AT HOME, OR GET ALONG WITH OTHER PEOPLE: SOMEWHAT DIFFICULT
SUM OF ALL RESPONSES TO PHQ QUESTIONS 1-9: 17
SUM OF ALL RESPONSES TO PHQ QUESTIONS 1-9: 17

## 2022-10-17 ASSESSMENT — ASTHMA QUESTIONNAIRES
ACT_TOTALSCORE: 18
ACT_TOTALSCORE: 18
QUESTION_1 LAST FOUR WEEKS HOW MUCH OF THE TIME DID YOUR ASTHMA KEEP YOU FROM GETTING AS MUCH DONE AT WORK, SCHOOL OR AT HOME: SOME OF THE TIME
QUESTION_4 LAST FOUR WEEKS HOW OFTEN HAVE YOU USED YOUR RESCUE INHALER OR NEBULIZER MEDICATION (SUCH AS ALBUTEROL): NOT AT ALL
QUESTION_5 LAST FOUR WEEKS HOW WOULD YOU RATE YOUR ASTHMA CONTROL: WELL CONTROLLED
QUESTION_3 LAST FOUR WEEKS HOW OFTEN DID YOUR ASTHMA SYMPTOMS (WHEEZING, COUGHING, SHORTNESS OF BREATH, CHEST TIGHTNESS OR PAIN) WAKE YOU UP AT NIGHT OR EARLIER THAN USUAL IN THE MORNING: NOT AT ALL
QUESTION_2 LAST FOUR WEEKS HOW OFTEN HAVE YOU HAD SHORTNESS OF BREATH: MORE THAN ONCE A DAY

## 2022-10-17 ASSESSMENT — PAIN SCALES - GENERAL: PAINLEVEL: NO PAIN (0)

## 2022-10-17 NOTE — PATIENT INSTRUCTIONS
Take Effexor, as prescribed.    If anxiety worsens or you begin to have thoughts of self-harm, please call the clinic or visit the emergency department.

## 2022-10-17 NOTE — PROGRESS NOTES
"Assessment & Plan   Generalized anxiety disorder  Pt presents today with worsening anxiety. Has had history of anxiety for past 10-15 years, however, it has recently resulted in the loss of close relationships. Denies panic attacks or SI. He is interested in starting a medication to control his anxiety. Not been on anxiety medication in the past. Given his anxiety complaints and PHQ-9 score, I started him on Effexor. We discussed initiating effexor vs wellbutin. I informed him that there were less sexual side effects with wellbutrin, however, he wanted to begin with effexor. Will follow up in 4 weeks to re-evaluate.  - TSH with free T4 reflex; Future  - venlafaxine (EFFEXOR XR) 37.5 MG 24 hr capsule; Take 1 capsule (37.5 mg) by mouth daily  - TSH with free T4 reflex    Screening for hyperlipidemia  Not checked within the past 10 years. Will order today.  - Lipid panel reflex to direct LDL Non-fasting; Future    Essential hypertension  BP today 132/92. Not on antihypertensive medication. Will consider starting medication for management in future.  - Comprehensive metabolic panel (BMP + Alb, Alk Phos, ALT, AST, Total. Bili, TP); Future    BMI 34.0-34.9,adult  Weight 243 today. No recent Hgb A1c checked.  - Hemoglobin A1c; Future     BMI:   Estimated body mass index is 34.68 kg/m  as calculated from the following:    Height as of this encounter: 1.784 m (5' 10.25\").    Weight as of this encounter: 110.4 kg (243 lb 6.4 oz).   Weight management plan: Discussed healthy diet and exercise guidelines    Depression Screening Follow Up    PHQ 10/17/2022   PHQ-9 Total Score 17   Q9: Thoughts of better off dead/self-harm past 2 weeks Not at all     Follow Up Actions Taken  Referred patient back to PCP     Return in about 4 weeks (around 11/14/2022) for In-Clinic Visit, Anxiety check.    Santo Ruiz PA-C  [facility]    Subjective   Santo is a 42 year old, presenting for the following health " "issues:  Anxiety  Pt in a 42 year old male with PMHx of asthma who presents with worsening anxiety. He states he has dealt with anxiety for the past 10-15 years. He decided to come today to receive help because his anxiety recently \"cost me my relationship with the mother of my children\". Reports frequent ruminating thoughts. Denies panic attacks and SI. He is interested in starting a medication today. He is also open to beginning counseling.    History of Present Illness       Reason for visit:  Anxiety    He eats 0-1 servings of fruits and vegetables daily.He consumes 4 sweetened beverage(s) daily.He exercises with enough effort to increase his heart rate 9 or less minutes per day.  He exercises with enough effort to increase his heart rate 3 or less days per week.   He is taking medications regularly.    Today's PHQ-9         PHQ-9 Total Score: 17    PHQ-9 Q9 Thoughts of better off dead/self-harm past 2 weeks :   Not at all    How difficult have these problems made it for you to do your work, take care of things at home, or get along with other people: Somewhat difficult     Review of Systems   Constitutional, HEENT, cardiovascular, pulmonary, gi and gu systems are negative, except as otherwise noted.      Objective    BP (!) 132/92 (BP Location: Right arm, Patient Position: Sitting, Cuff Size: Adult Large)   Pulse 66   Temp 97  F (36.1  C) (Tympanic)   Resp 20   Ht 1.784 m (5' 10.25\")   Wt 110.4 kg (243 lb 6.4 oz)   BMI 34.68 kg/m    Body mass index is 34.68 kg/m .  Physical Exam   GENERAL: healthy, alert and no distress. Sitting comfortably in room.  NECK: no adenopathy, no asymmetry, masses, or scars and thyroid normal to palpation  RESP: lungs clear to auscultation - no rales, rhonchi or wheezes  CV: regular rate and rhythm, normal S1 S2, no S3 or S4, no murmur, click or rub, no peripheral edema and peripheral pulses strong  MS: no gross musculoskeletal defects noted, no edema  PSYCH: normal mood and " affect.    Physician Attestation   I, Santo Ruiz PA-C, was present with the medical/DAKSHA student who participated in the service and in the documentation of the note.  I have verified the history and personally performed the physical exam and medical decision making.  I agree with the assessment and plan of care as documented in the note.      ALEXANDRE OliverC

## 2022-10-21 ENCOUNTER — MYC MEDICAL ADVICE (OUTPATIENT)
Dept: FAMILY MEDICINE | Facility: CLINIC | Age: 42
End: 2022-10-21

## 2022-10-21 DIAGNOSIS — F41.1 GENERALIZED ANXIETY DISORDER: Primary | ICD-10-CM

## 2022-11-01 ENCOUNTER — MYC MEDICAL ADVICE (OUTPATIENT)
Dept: FAMILY MEDICINE | Facility: CLINIC | Age: 42
End: 2022-11-01

## 2022-11-11 ENCOUNTER — ANCILLARY PROCEDURE (OUTPATIENT)
Dept: GENERAL RADIOLOGY | Facility: CLINIC | Age: 42
End: 2022-11-11
Attending: PHYSICIAN ASSISTANT
Payer: COMMERCIAL

## 2022-11-11 ENCOUNTER — OFFICE VISIT (OUTPATIENT)
Dept: URGENT CARE | Facility: URGENT CARE | Age: 42
End: 2022-11-11
Payer: COMMERCIAL

## 2022-11-11 VITALS
RESPIRATION RATE: 20 BRPM | SYSTOLIC BLOOD PRESSURE: 139 MMHG | DIASTOLIC BLOOD PRESSURE: 85 MMHG | OXYGEN SATURATION: 98 % | HEART RATE: 70 BPM | TEMPERATURE: 97.7 F

## 2022-11-11 DIAGNOSIS — M79.671 RIGHT FOOT PAIN: ICD-10-CM

## 2022-11-11 DIAGNOSIS — M79.671 RIGHT FOOT PAIN: Primary | ICD-10-CM

## 2022-11-11 LAB
ERYTHROCYTE [DISTWIDTH] IN BLOOD BY AUTOMATED COUNT: 13.2 % (ref 10–15)
HCT VFR BLD AUTO: 48.9 % (ref 40–53)
HGB BLD-MCNC: 16 G/DL (ref 13.3–17.7)
MCH RBC QN AUTO: 27.5 PG (ref 26.5–33)
MCHC RBC AUTO-ENTMCNC: 32.7 G/DL (ref 31.5–36.5)
MCV RBC AUTO: 84 FL (ref 78–100)
PLAT MORPH BLD: NORMAL
PLATELET # BLD AUTO: 176 10E3/UL (ref 150–450)
RBC # BLD AUTO: 5.81 10E6/UL (ref 4.4–5.9)
RBC MORPH BLD: NORMAL
WBC # BLD AUTO: 9.7 10E3/UL (ref 4–11)

## 2022-11-11 PROCEDURE — 85027 COMPLETE CBC AUTOMATED: CPT | Performed by: PHYSICIAN ASSISTANT

## 2022-11-11 PROCEDURE — 73630 X-RAY EXAM OF FOOT: CPT | Mod: TC | Performed by: RADIOLOGY

## 2022-11-11 PROCEDURE — 84550 ASSAY OF BLOOD/URIC ACID: CPT | Performed by: PHYSICIAN ASSISTANT

## 2022-11-11 PROCEDURE — 36415 COLL VENOUS BLD VENIPUNCTURE: CPT | Performed by: PHYSICIAN ASSISTANT

## 2022-11-11 PROCEDURE — 99214 OFFICE O/P EST MOD 30 MIN: CPT | Performed by: PHYSICIAN ASSISTANT

## 2022-11-11 RX ORDER — SULFAMETHOXAZOLE/TRIMETHOPRIM 800-160 MG
1 TABLET ORAL 2 TIMES DAILY
Qty: 14 TABLET | Refills: 0 | Status: SHIPPED | OUTPATIENT
Start: 2022-11-11 | End: 2022-11-23

## 2022-11-11 RX ORDER — PREDNISONE 20 MG/1
20 TABLET ORAL DAILY
Qty: 5 TABLET | Refills: 0 | Status: SHIPPED | OUTPATIENT
Start: 2022-11-11 | End: 2022-11-12

## 2022-11-11 NOTE — PATIENT INSTRUCTIONS
I suspect that this is a small infection in your toe, please start taking bactrim antibiotics  For the rest of the pain and swelling, Prednisone will help with that  Rest and ice your foot  No acute fracture or dislocation seen on x-ray.  Advised RICE therapy, including (rest, ice, compression, elevation)   Follow-up with Orthopedics / sports medicine in one week if symptoms worsen or do not improve.   Seek emergency care if you develop severe pain/swelling, inability to move extremity, numbness / tingling, weakness, skin paleness, or icy cold extremity.

## 2022-11-11 NOTE — LETTER
[facility] [place]  [address]  [address]  [place] [address]  Phone: [phone]  Fax: [phone]    November 11, 2022        Santo Allisonrebecca  [address]  [place] [address]          To whom it may concern:    RE: Santo Allisonrebecca    Patient was seen and treated today at our clinic. Please excuse from work 11/11/2022.     Please contact me for questions or concerns.      Sincerely,        Jaclyn Cabrera PA-C

## 2022-11-12 ENCOUNTER — HOSPITAL ENCOUNTER (EMERGENCY)
Facility: CLINIC | Age: 42
Discharge: HOME | End: 2022-11-12
Attending: EMERGENCY MEDICINE | Admitting: EMERGENCY MEDICINE
Payer: COMMERCIAL

## 2022-11-12 VITALS
HEART RATE: 81 BPM | RESPIRATION RATE: 20 BRPM | SYSTOLIC BLOOD PRESSURE: 121 MMHG | OXYGEN SATURATION: 99 % | DIASTOLIC BLOOD PRESSURE: 84 MMHG | TEMPERATURE: 97.9 F

## 2022-11-12 DIAGNOSIS — M10.9 ACUTE GOUT INVOLVING TOE OF RIGHT FOOT, UNSPECIFIED CAUSE: ICD-10-CM

## 2022-11-12 DIAGNOSIS — M79.674 GREAT TOE PAIN, RIGHT: ICD-10-CM

## 2022-11-12 LAB — URATE SERPL-MCNC: 7.9 MG/DL (ref 3.5–7.2)

## 2022-11-12 PROCEDURE — 99284 EMERGENCY DEPT VISIT MOD MDM: CPT

## 2022-11-12 PROCEDURE — 250N000013 HC RX MED GY IP 250 OP 250 PS 637: Performed by: EMERGENCY MEDICINE

## 2022-11-12 RX ORDER — INDOMETHACIN 50 MG/1
50 CAPSULE ORAL ONCE
Status: COMPLETED | OUTPATIENT
Start: 2022-11-12 | End: 2022-11-12

## 2022-11-12 RX ORDER — HYDROCODONE BITARTRATE AND ACETAMINOPHEN 5; 325 MG/1; MG/1
1-2 TABLET ORAL EVERY 6 HOURS PRN
Qty: 10 TABLET | Refills: 0 | Status: SHIPPED | OUTPATIENT
Start: 2022-11-12 | End: 2022-11-23

## 2022-11-12 RX ORDER — ACETAMINOPHEN 500 MG
1000 TABLET ORAL ONCE
Status: COMPLETED | OUTPATIENT
Start: 2022-11-12 | End: 2022-11-12

## 2022-11-12 RX ORDER — PREDNISONE 10 MG/1
TABLET ORAL
Qty: 32 TABLET | Refills: 0 | Status: SHIPPED | OUTPATIENT
Start: 2022-11-12 | End: 2022-11-22

## 2022-11-12 RX ADMIN — ACETAMINOPHEN 1000 MG: 500 TABLET ORAL at 08:06

## 2022-11-12 RX ADMIN — INDOMETHACIN 50 MG: 50 CAPSULE ORAL at 08:06

## 2022-11-12 ASSESSMENT — ENCOUNTER SYMPTOMS
FEVER: 0
CONSTITUTIONAL NEGATIVE: 1
ARTHRALGIAS: 1

## 2022-11-12 ASSESSMENT — ACTIVITIES OF DAILY LIVING (ADL): ADLS_ACUITY_SCORE: 35

## 2022-11-12 NOTE — PROGRESS NOTES
Assessment & Plan     1. Right foot pain  Patient with right foot pain for the past several days. He has swelling of his toe surrounding his nail consistent with paronychia, but he also has swelling in his midfoot with erythema. He is NVI   No laboratory evidence of leukocytosis and XR is negative per my read. Will treat with bactrim for paronychia and prednisone for swelling, and likely gout (HX of gout and reports that his current symptoms feel very similar) Encouraged elevation and rest. Tylenol for pain.   Uric acid is pending.   - XR Foot Right G/E 3 Views; Future  - CBC with platelets; Future  - Uric acid; Future  - predniSONE (DELTASONE) 20 MG tablet; Take 1 tablet (20 mg) by mouth daily for 5 days  Dispense: 5 tablet; Refill: 0  - sulfamethoxazole-trimethoprim (BACTRIM DS) 800-160 MG tablet; Take 1 tablet by mouth 2 times daily for 7 days  Dispense: 14 tablet; Refill: 0        Return in about 3 days (around 11/14/2022), or if symptoms worsen or fail to improve.    Diagnosis and treatment plan was reviewed with patient and/or family.   We went over any labs or imaging. Discussed worsening symptoms or little to no relief despite treatment plan to follow-up with PCP or return to clinic.  Patient verbalizes understanding. All questions were addressed and answered.     Jaclyn Cabrera PA-C  [facility] [place]    CHIEF COMPLAINT:   Chief Complaint   Patient presents with     Foot Pain     Right foot pain X two or three days - woke up with worsening pain today. Top & bottom pain - swollen slightly - has been diagnosed with gout in the past.            Subjective     Santo is a 42 year old male who presents to clinic today for evaluation of right foot pain and swelling.  Symptoms started 2-3 days ago and have been worsening. He has been taking IBU for his symptoms  Patient denies having fever, chills, numbness, pale or cold extremity. Feels some tingling on the dorsum of his foot.   HX of gout  in the past.       Past Medical History:   Diagnosis Date     Asthma     seasonal, spring and fall     Deaf      Kidney stone 09/14/2017    had R side stent placed and then on 10/5/2017 had lithitripsy     PONV (postoperative nausea and vomiting)      Past Surgical History:   Procedure Laterality Date     CYSTOSCOPY, RETROGRADES, INSERT STENT URETER(S), COMBINED Right 9/14/2017    Procedure: COMBINED CYSTOSCOPY, RETROGRADES, INSERT STENT URETER(S);  Cysto , right stent placement;  Surgeon: Pavel Lundy MD;  Location: [facility] OR     LASER HOLMIUM LITHOTRIPSY URETER(S), INSERT STENT, COMBINED Right 10/5/2017    Procedure: COMBINED CYSTOSCOPY, URETEROSCOPY, LASER HOLMIUM LITHOTRIPSY URETER(S), INSERT STENT;  Cystoscopy, right ureteroscopy, laser lithotripsy, stent exchange;  Surgeon: Pavel Lundy MD;  Location: [facility] OR     Social History     Tobacco Use     Smoking status: Never     Smokeless tobacco: Never   Substance Use Topics     Alcohol use: Yes     Comment: on occasion     Current Outpatient Medications   Medication     albuterol (PROAIR HFA/PROVENTIL HFA/VENTOLIN HFA) 108 (90 Base) MCG/ACT inhaler     albuterol (PROAIR HFA/PROVENTIL HFA/VENTOLIN HFA) 108 (90 Base) MCG/ACT inhaler     predniSONE (DELTASONE) 20 MG tablet     sulfamethoxazole-trimethoprim (BACTRIM DS) 800-160 MG tablet     venlafaxine (EFFEXOR XR) 37.5 MG 24 hr capsule     acetaminophen (TYLENOL) 500 MG tablet     IBUPROFEN PO     No current facility-administered medications for this visit.     Allergies   Allergen Reactions     Cephalosporins      Erythromycin      Percocet [Oxycodone-Acetaminophen] Nausea and Vomiting     Penicillins Other (See Comments) and Rash     Vomiting as a infant       10 point ROS of systems were all negative except for pertinent positives noted in my HPI.      Exam:   /85   Pulse 70   Temp 97.7  F (36.5  C) (Tympanic)   Resp 20   SpO2 98%   Gen: healthy,alert,no distress  Extremity:  Right dorsal aspect of foot has swelling around great toe proximal nail margin. He also has swelling of dorsum of foot and mid foot. TTP.  No warmth.  There is not compromise to the distal circulation.  Pulses are +2 and CRT is brisk  EXTREMITIES: peripheral pulses normal  SKIN: no suspicious lesions or rashes  NEURO: Normal strength and tone, sensory exam grossly normal, mentation intact and speech normal    WBC 9.7  HGB 16      Results for orders placed or performed in visit on 11/11/22   XR Foot Right G/E 3 Views     Status: None    Narrative    FOOT RIGHT THREE OR MORE VIEWS   11/11/2022 1:06 PM     HISTORY: Severe right foot pain for three days. Slight swelling.     COMPARISON: None.      Impression    IMPRESSION: Small Achilles enthesophyte. Normal otherwise. There is no  evidence of fracture or inflammatory arthropathy. No soft tissue  calcification, foreign body or soft tissue gas evident. Nothing to  suggest osteomyelitis radiographically.    BRENDA MIRELES MD         SYSTEM ID:  [id number]

## 2022-11-12 NOTE — ED PROVIDER NOTES
History   Chief Complaint:  Foot Pain       The history is provided by the patient and medical records.  used: ASL.      Santo Toure is a 42 year old male who presents with right foot pain that has been worsening the past several days. Per his office visit yesterday, he has swelling of his toe surrounding his nail consisted with paronychia, but he also has midfoot swelling with erythema. He has been taking medications (prednisone and sulfamethoxazole-trimethoprim)prescribed to him from urgent care. He took ibuprofen as well these last 2 days and states nothing changed. His last dose of ibuprofen was 1200 yesterday.    Per the patient with the help of a , his pain started Wednesday and worsened each day. However, it his pain significantly worsened overnight last evening which prompted him to visit the ED as he was advised to do if his symptoms worsened since visiting urgent care. He states that his pain is underneath his big toe and second toe and onto the surface of his foot. His pain is worse at the base of his big toe, he denies any pain near nail of the toe. The patient states it is painful to walk or touch his foot. He denies eating a lot of red meats or red wine. He denies that he is experiencing fever or chills. He did not sustain recent trauma to his right foot or toe. He states that he has experienced this type of pain before but never as severe.    Lab results 11/11/2022:  WBC WNL    Right foot XR 11/11/2022:  IMPRESSION: Small Achilles enthesophyte. Normal otherwise. There is no  evidence of fracture or inflammatory arthropathy. No soft tissue  calcification, foreign body or soft tissue gas evident. Nothing to  suggest osteomyelitis radiographically.    Review of Systems   Constitutional: Negative.  Negative for fever.   Musculoskeletal: Positive for arthralgias.   All other systems reviewed and are  negative.    Allergies:  Cephalosporins  Erythromycin  Percocet  Penicillins    Medications:  Acetaminophen  Albuterol inhaler  Prednisone  Sulfamethoxazole-trimethoprim  Venlafaxine    Past Medical History:     Kidney stone  Pyleonephritis  Asthma    Past Surgical History:    Cystoscopy  Laser holmium lithotripsy ureters, insert stent, combined    Family History:    Mother: CAD    Social History:  The patient presents to the ED alone.  PCP: [facility]     Physical Exam     Patient Vitals for the past 24 hrs:   BP Temp Temp src Pulse Resp SpO2   11/12/22 0823 121/84 -- -- 81 -- 99 %   11/12/22 0643 (!) 133/102 97.9  F (36.6  C) -- 100 -- --   11/12/22 0640 -- -- Temporal -- 20 96 %       Physical Exam  General: Adult male sitting upright  Eyes: PERRL, Conjunctive within normal limits  ENT: Moist mucous membranes  CV: Normal S1S2, no murmur, rub or gallop. Regular rate and rhythm.  DP and PT pulses intact right foot and ankle  Resp: Normal respiratory effort.  MSK: No edema.  Tenderness at the great toe MCP with mild lacy redness overlying.  No visible paronychia or nail infection.  Nontender over the remainder of the right foot.    Skin: Warm and dry.  Erythema as described above, mild.  No other rashes or lesions or ecchymoses on visible skin.  Neuro: Alert and oriented. Responds appropriately to all questions and commands. No focal findings appreciated. Normal muscle tone. Sensation intact to light touch over the entire right foot and toe webs.   Psych: Normal mood and affect. Pleasant.    Emergency Department Course     Emergency Department Course:     Reviewed:  I reviewed nursing notes, vitals, past medical history and Care Everywhere    Assessments:  0701 I obtained history and examined the patient as noted above.   0759 I rechecked the patient and explained findings. I obtained more detailed history with an .    Interventions:  0806 Indomethacin 50 mg  PO  0806 Acetaminophen 1 g PO    Disposition:  The patient was discharged to home.     Impression & Plan     Medical Decision Making:  Santo Toure is a 42 year old male who presents for evaluation of left foot pain localized at the great toe MTP joint. There is reported swelling but nothing objective on examination.  There is slight lacy erythema but no evidence of cellulitis.  This is most consistent clinically with gout or pseudogout.  He has had 1 day of steroid but low dose, and was placed on a tapering and substitute.  He is much more comfortable for soled shoe and crutches for now.  He has had no fever and there are no clinical signs concerning for septic arthritis.  Small number of Vicodin sent home with him as needed for more severe pain at night.  I doubt at this point that this is a septic arthritis, fracture, pseudogout, cellulitis, or other worrisome etiology.  Supportive outpatient management indicated.  Should see primary on Monday for recheck. Gout information given form home.        Diagnosis:    ICD-10-CM    1. Great toe pain, right  M79.674       2. Acute gout involving toe of right foot, unspecified cause  M10.9           Discharge Medications:  Discharge Medication List as of 11/12/2022  8:15 AM      START taking these medications    Details   HYDROcodone-acetaminophen (NORCO) 5-325 MG tablet Take 1-2 tablets by mouth every 6 hours as needed for severe pain, Disp-10 tablet, R-0, E-Prescribe           Scribe Disclosure:  EDDA, Ghassan Adair, am serving as a scribe at 7:12 AM on 11/12/2022 to document services personally performed by Wendi Mayo MD based on my observations and the provider's statements to me.        Wendi Mayo MD  11/12/22 2470

## 2022-11-16 ENCOUNTER — OFFICE VISIT (OUTPATIENT)
Dept: FAMILY MEDICINE | Facility: CLINIC | Age: 42
End: 2022-11-16
Payer: COMMERCIAL

## 2022-11-16 VITALS
HEART RATE: 70 BPM | DIASTOLIC BLOOD PRESSURE: 89 MMHG | SYSTOLIC BLOOD PRESSURE: 126 MMHG | BODY MASS INDEX: 34.13 KG/M2 | TEMPERATURE: 96.8 F | OXYGEN SATURATION: 96 % | WEIGHT: 239.6 LBS | RESPIRATION RATE: 20 BRPM

## 2022-11-16 DIAGNOSIS — F41.1 GENERALIZED ANXIETY DISORDER: ICD-10-CM

## 2022-11-16 PROCEDURE — T1013 SIGN LANG/ORAL INTERPRETER: HCPCS | Mod: U3

## 2022-11-16 PROCEDURE — 99213 OFFICE O/P EST LOW 20 MIN: CPT | Performed by: PHYSICIAN ASSISTANT

## 2022-11-16 RX ORDER — VENLAFAXINE HYDROCHLORIDE 37.5 MG/1
37.5 CAPSULE, EXTENDED RELEASE ORAL DAILY
Qty: 90 CAPSULE | Refills: 3 | Status: SHIPPED | OUTPATIENT
Start: 2022-11-16 | End: 2023-04-17

## 2022-11-16 ASSESSMENT — ANXIETY QUESTIONNAIRES
7. FEELING AFRAID AS IF SOMETHING AWFUL MIGHT HAPPEN: NOT AT ALL
7. FEELING AFRAID AS IF SOMETHING AWFUL MIGHT HAPPEN: NOT AT ALL
4. TROUBLE RELAXING: NOT AT ALL
GAD7 TOTAL SCORE: 0
3. WORRYING TOO MUCH ABOUT DIFFERENT THINGS: NOT AT ALL
GAD7 TOTAL SCORE: 0
8. IF YOU CHECKED OFF ANY PROBLEMS, HOW DIFFICULT HAVE THESE MADE IT FOR YOU TO DO YOUR WORK, TAKE CARE OF THINGS AT HOME, OR GET ALONG WITH OTHER PEOPLE?: NOT DIFFICULT AT ALL
6. BECOMING EASILY ANNOYED OR IRRITABLE: NOT AT ALL
2. NOT BEING ABLE TO STOP OR CONTROL WORRYING: NOT AT ALL
IF YOU CHECKED OFF ANY PROBLEMS ON THIS QUESTIONNAIRE, HOW DIFFICULT HAVE THESE PROBLEMS MADE IT FOR YOU TO DO YOUR WORK, TAKE CARE OF THINGS AT HOME, OR GET ALONG WITH OTHER PEOPLE: NOT DIFFICULT AT ALL
GAD7 TOTAL SCORE: 0
1. FEELING NERVOUS, ANXIOUS, OR ON EDGE: NOT AT ALL
5. BEING SO RESTLESS THAT IT IS HARD TO SIT STILL: NOT AT ALL

## 2022-11-16 ASSESSMENT — ENCOUNTER SYMPTOMS: NERVOUS/ANXIOUS: 1

## 2022-11-16 ASSESSMENT — PAIN SCALES - GENERAL: PAINLEVEL: NO PAIN (0)

## 2022-11-16 NOTE — PROGRESS NOTES
Assessment & Plan   Generalized anxiety disorder  Anxiety much improved with Effexor. Now controlled. No significant s/e. We will continue Effexor at his current dose. Follow-up in 6 months for recheck, sooner if needed based on symptoms.   - venlafaxine (EFFEXOR XR) 37.5 MG 24 hr capsule; Take 1 capsule (37.5 mg) by mouth daily    Return in 6 months (on 5/16/2023), or if symptoms worsen or fail to improve, for In-Clinic Visit.    Santo Ruiz PA-C  [facility]    Subjective   Santo is a 42 year old, presenting for the following health issues:  Anxiety      Anxiety    History of Present Illness       Mental Health Follow-up:  Patient presents to follow-up on Anxiety.    Patient's anxiety since last visit has been:  Better  The patient is not having other symptoms associated with anxiety.  Any significant life events: relationship concerns  Patient is not feeling anxious or having panic attacks.  Patient has no concerns about alcohol or drug use.    He eats 0-1 servings of fruits and vegetables daily.He consumes 2 sweetened beverage(s) daily.He exercises with enough effort to increase his heart rate 20 to 29 minutes per day.  He exercises with enough effort to increase his heart rate 4 days per week.   He is taking medications regularly.  Today's GEOVANNY-7 Score: 0       Concern - Blood pressure   Onset: last couple years   Description: Says he would like to discuss his blood pressure as it seems it has been high when he comes to clinic  Intensity: mild  Progression of Symptoms:  worsening  Accompanying Signs & Symptoms: no  Previous history of similar problem: na   Precipitating factors:        Worsened by: na   Alleviating factors:        Improved by: na   Therapies tried and outcome:  none       Review of Systems   Psychiatric/Behavioral: The patient is nervous/anxious.       History obtained through an in-person , Juanita.         Objective    /89   Pulse 70    Temp 96.8  F (36  C) (Tympanic)   Resp 20   Wt 108.7 kg (239 lb 9.6 oz)   SpO2 96%   BMI 34.13 kg/m    Body mass index is 34.13 kg/m .  Physical Exam   Constitutional: healthy, alert, and no distress  Head: Normocephalic. Atraumatic  Eyes: No conjunctival injection, sclera anicteric  Respiratory: No resp distress.  Musculoskeletal: extremities normal- no gross deformities noted, and normal muscle tone  Neurologic: Gait normal. CN 2-12 grossly intact  Psychiatric: mentation appears normal and affect normal/bright

## 2022-11-17 ENCOUNTER — TELEPHONE (OUTPATIENT)
Dept: FAMILY MEDICINE | Facility: CLINIC | Age: 42
End: 2022-11-17

## 2022-11-17 NOTE — TELEPHONE ENCOUNTER
Patient Quality Outreach    Patient is due for the following:   Asthma  -  ACT needed    Next Steps:   No follow up needed at this time.    Type of outreach:    Sent [employer] message.      Questions for provider review:    None     Bela De Guzman CMA

## 2022-12-15 ENCOUNTER — TELEPHONE (OUTPATIENT)
Dept: FAMILY MEDICINE | Facility: CLINIC | Age: 42
End: 2022-12-15

## 2022-12-15 NOTE — TELEPHONE ENCOUNTER
Patient Quality Outreach    Patient is due for the following:   Asthma  -  ACT needed    Next Steps:   No follow up needed at this time.    Type of outreach:    Sent [employer] message.      Questions for provider review:    None     Bela De Guzman CMA  Chart routed to Care Team.

## 2023-01-02 ENCOUNTER — VIRTUAL VISIT (OUTPATIENT)
Dept: PSYCHOLOGY | Facility: CLINIC | Age: 43
End: 2023-01-02
Attending: PHYSICIAN ASSISTANT
Payer: COMMERCIAL

## 2023-01-02 DIAGNOSIS — F41.1 GENERALIZED ANXIETY DISORDER: ICD-10-CM

## 2023-01-02 PROCEDURE — 90791 PSYCH DIAGNOSTIC EVALUATION: CPT | Mod: TEL | Performed by: SOCIAL WORKER

## 2023-01-02 NOTE — PROGRESS NOTES
"    [facility]      PATIENT'S NAME: Santo Toure  PREFERRED NAME: Santo  PRONOUNS:       MRN: [id number]  : 1980  ADDRESS: [facility]  [place] [address]  [id number]  DATE OF SERVICE: 23  START TIME: 12pm  END TIME: 12:45pm  PREFERRED PHONE: [phone]  May we leave a program related message: Yes  SERVICE MODALITY:  Phone Visit:      Provider verified identity through the following two step process.  Patient provided:  Patient     The patient has been notified of the following:      \"We have found that certain health care needs can be provided without the need for a face to face visit.  This service lets us provide the care you need with a phone conversation.       I will have full access to your [facility] medical record during this entire phone call.   I will be taking notes for your medical record.      Since this is like an office visit, we will bill your insurance company for this service.       There are potential benefits and risks of telephone visits (e.g. limits to patient confidentiality) that differ from in-person visits.?Confidentiality still applies for telephone services, and nobody will record the visit.  It is important to be in a quiet, private space that is free of distractions (including cell phone or other devices) during the visit.??      If during the course of the call I believe a telephone visit is not appropriate, you will not be charged for this service\"     Consent has been obtained for this service by care team member: Yes     UNIVERSAL ADULT Mental Health DIAGNOSTIC ASSESSMENT    Identifying Information:  Patient is a 42 year old,   individual.  Patient was referred for an assessment by primary care providerprimary care provider.  Patient attended the session alone.    Chief Complaint:   The reason for seeking services at this time is: \"I had anxiety which I think lead to my tempers\".  The problem(s) " began 06/01/22.    Patient has attempted to resolve these concerns in the past through medication.    Social/Family History:  Patient reported they grew up in [place], [place].  They were raised by biological parents  .  Parents were always together.     The patient describes their cultural background as .  Cultural influences and impact on patient's life structure, values, norms, and healthcare: [place] in [place] town being the only deaf person in school. was often made fun of,  and bullied..  Contextual influences on patient's health include: Contextual Factors: Individual Factors client is deaf.    These factors will be addressed in the Preliminary Treatment plan. Patient identified their preferred language to be American Sign Language. Patient reported they does not need the assistance of an  or other support involved in therapy.     Patient reported had no significant delays in developmental tasks.   Patient's highest education level was associate degree / vocational certificate  .  Patient identified the following learning problems: none reported.  Modifications will not be used to assist communication in therapy. Patient reports they are  able to understand written materials.    Patient's current relationship status is single.   Patient identified their sexual orientation as heterosexual.  Patient reported having 2 child(kae). Patient identified parents; siblings as part of their support system.  Patient identified the quality of these relationships as good,  .      Patient's current living/housing situation involves staying in own home/apartment.  The immediate members of family and household include self, self,self and they report that housing is stable.    Patient is currently employed fulltime.  Patient reports their finances are obtained through employment. Patient does identify finances as a current stressor.      Patient reported that they have been involved with the legal  system.  My ex-girlfriend (the mother of my children) has a OFP on me. We are currently in child custody right now and I am the Petitioner.;I am currently charged with Domestic Assault. Court is on 01/03/2023. Patient does not report being under probation/ parole/ jurisdiction. They are not under any current court jurisdiction. .    Patient's Strengths and Limitations:  Patient identified the following strengths or resources that will help them succeed in treatment: commitment to health and well being and motivation. Things that may interfere with the patient's success in treatment include: lack of family support.     Assessments:  The following assessments were completed by patient for this visit:  PHQ9:   PHQ-9 SCORE 10/17/2022   PHQ-9 Total Score MyChart 17 (Moderately severe depression)   PHQ-9 Total Score 17     GAD7:   GEOVANNY-7 SCORE 11/16/2022   Total Score 0 (minimal anxiety)   Total Score 0       Personal and Family Medical History:  Patient does not report a family history of mental health concerns.  Patient reports family history includes Heart Disease in his mother..     Patient does report Mental Health Diagnosis and/or Treatment.  Patient Patient reported the following previous diagnoses which include(s): an Anxiety Disorder.  Patient reported symptoms began within the past year.   Patient has received mental health services in the past: primary care provider at [facility]..  Psychiatric Hospitalizations: None.  Patient denies a history of civil commitment.  Patient is receiving other mental health services.  These include none.         Patient has had a physical exam to rule out medical causes for current symptoms.  Date of last physical exam was within the past year. Client was encouraged to follow up with PCP if symptoms were to develop. The patient has a [facility] Primary Care Provider, who is named [facility]..  Patient reports no current medical concerns.  Patient denies  any issues with pain..   There are not significant appetite / nutritional concerns / weight changes.   Patient does not report a history of head injury / trauma / cognitive impairment.      Patient reports current meds as:   Outpatient Medications Marked as Taking for the 1/2/23 encounter (Virtual Visit) with Francesca Trinidad   Medication Sig     acetaminophen (TYLENOL) 500 MG tablet Take 2 tablets (1,000 mg) by mouth every 6 hours as needed for mild pain or fever     albuterol (PROAIR HFA/PROVENTIL HFA/VENTOLIN HFA) 108 (90 Base) MCG/ACT inhaler Inhale 2 puffs every 4-6 hours as needed for cough, wheezing, or shortness of breath     IBUPROFEN PO Take 800 mg by mouth every 8 hours as needed for moderate pain      venlafaxine (EFFEXOR XR) 37.5 MG 24 hr capsule Take 1 capsule (37.5 mg) by mouth daily       Medication Adherence:  Patient reports taking.  taking prescribed medications as prescribed.    Patient Allergies:    Allergies   Allergen Reactions     Cephalosporins      Erythromycin      Percocet [Oxycodone-Acetaminophen] Nausea and Vomiting     Penicillins Other (See Comments) and Rash     Vomiting as a infant       Medical History:    Past Medical History:   Diagnosis Date     Asthma     seasonal, spring and fall     Deaf      Kidney stone 09/14/2017    had R side stent placed and then on 10/5/2017 had lithitripsy     PONV (postoperative nausea and vomiting)          Current Mental Status Exam:   Appearance:  Appropriate    Eye Contact:  Good   Psychomotor:  Normal       Gait / station:  no problem  Attitude / Demeanor: Cooperative   Speech      Rate / Production: Normal/ Responsive      Volume:  Normal  volume      Language:  intact  Mood:   Anxious   Affect:   Appropriate    Thought Content: Clear   Thought Process: Coherent  Logical       Associations: No loosening of associations  Insight:   Good   Judgment:  Intact   Orientation:  All  Attention/concentration: Good      Substance Use:  Patient did not  report a family history of substance use concerns; see medical history section for details.  Patient has not received chemical dependency treatment in the past.  Patient has not ever been to detox.      Patient is not currently receiving any chemical dependency treatment.           Substance History of use Age of first use Date of last use     Pattern and duration of use (include amounts and frequency)   Alcohol used in the past   21 12/17/22    Cannabis   never used          Amphetamines   never used        Cocaine/crack    never used          Hallucinogens never used            Inhalants never used            Heroin never used            Other Opiates used in the past 37- prescriped my DrLuzmaria used percocet for my hand injury at work 10/01/17    Benzodiazepine   never used        Barbiturates never used        Over the counter meds never used        Caffeine currently use unknown- I drink Dr. Pepper regularly      Nicotine  never used        Other substances not listed above:  Identify:  never used          Patient reported the following problems as a result of their substance use: no problems, not applicable.    Substance Use: No symptoms    Based on the negative CAGE score and clinical interview there  are not indications of drug or alcohol abuse.      Significant Losses / Trauma / Abuse / Neglect Issues:   Patient did not serve in the .  There are indications or report of significant loss, trauma, abuse or neglect issues related to: changes in child custody rights currently in court..  Concerns for possible neglect are not present.     Safety Assessment:   Patient denies current homicidal ideation and behaviors.  Patient denies current self-injurious ideation and behaviors.    Patient denied risk behaviors associated with substance use.  Patient denies any high risk behaviors associated with mental health symptoms.  Patient reports the following current concerns for their personal safety: None.  Patient  reports there are not firearms in the house.       There are no firearms in the home..    History of Safety Concerns:  Patient denied a history of homicidal ideation.     Patient denied a history of personal safety concerns.    Patient denied a history of assaultive behaviors.    Patient denied a history of sexual assault behaviors.     Patient denied a history of risk behaviors associated with substance use.  Patient denies any history of high risk behaviors associated with mental health symptoms.  Patient reports the following protective factors: dedication to family or friends; safe and stable environment; regular sleep; regular physical activity; commitment to well being; sense of meaning; financial stability; strong sense of self worth or esteem; sense of personal control or determination    Risk Plan:  See Recommendations for Safety and Risk Management Plan    Review of Symptoms per patient report:   Depression: Excessive or inappropriate guilt and Anger outbursts  Sandi:  No Symptoms  Psychosis: No Symptoms  Anxiety: Excessive worry, Nervousness, Physical complaints, such as headaches, stomachaches, muscle tension, Ruminations and Irritability  Panic:  No symptoms  Post Traumatic Stress Disorder:  No Symptoms   Eating Disorder: No Symptoms  ADD / ADHD:  No symptoms  Conduct Disorder: No symptoms  Autism Spectrum Disorder: No symptoms  Obsessive Compulsive Disorder: No Symptoms    Patient reports the following compulsive behaviors and treatment history: none.      Diagnostic Criteria:   Generalized Anxiety Disorder  A. Excessive anxiety and worry about a number of events or activities (such as work or school performance).   B. The person finds it difficult to control the worry.  C. Select 3 or more symptoms (required for diagnosis). Only one item is required in children.   - Restlessness or feeling keyed up or on edge.    - Being easily fatigued.    - Difficulty concentrating or mind going blank.    - Sleep  disturbance (difficulty falling or staying asleep, or restless unsatisfying sleep).   D. The focus of the anxiety and worry is not confined to features of an Axis I disorder.  E. The anxiety, worry, or physical symptoms cause clinically significant distress or impairment in social, occupational, or other important areas of functioning.   F. The disturbance is not due to the direct physiological effects of a substance (e.g., a drug of abuse, a medication) or a general medical condition (e.g., hyperthyroidism) and does not occur exclusively during a Mood Disorder, a Psychotic Disorder, or a Pervasive Developmental Disorder.    Functional Status:  Patient reports the following functional impairments:  relationship(s), self-care and social interactions.     Nonprogrammatic care:  Patient is requesting basic services to address current mental health concerns.    Clinical Summary:  1. Reason for assessment: anxiety  .  2. Psychosocial, Cultural and Contextual Factors: recent break up and going through custody issues.  3. Principal DSM5 Diagnoses  (Sustained by DSM5 Criteria Listed Above):   300.02 (F41.1) Generalized Anxiety Disorder.      Recommendations:     1. Plan for Safety and Risk Management:Recommended that patient call 911 or go to the local ED should there be a change in any of these risk factors..  Report to child / adult protection services was NA.     2. Initial Treatment will focus on: anxiety and grief     3. Resources/Service Plan:       services are not indicated.     Modifications to assist communication are not indicated.     Additional disability accommodations are not indicated.      4. Collaboration:  Collaboration / coordination of treatment will be initiated with the following support professionals: primary care physician.      5.  Referrals:  The following referral(s) will be initiated: none.   A Release of Information has been obtained for the following: none.    6. GHULAM: GHULAM:   Discussed the general effects of drugs and alcohol on health and well-being. Provider gave patient printed information about the effects of chemical use on their health and well being. Recommendations:  none at the time of this assessment.     7. Records were reviewed at time of assessment.  Information in this assessment was obtained from the medical record and provided by patient who is a good historian.   Patient will have open access to their mental health medical record.      Eval type:  Mental Health    Staff Name/Credentials:  JYOTHI Bae, LICSW     January 2, 2023

## 2023-01-03 NOTE — DISCHARGE INSTRUCTIONS
Take antibiotics as prescribed for possible jaw, dental infection.  Eardrops as prescribed for wax.    Take Tylenol, and ibuprofen for pain.   no

## 2023-01-12 ENCOUNTER — TELEPHONE (OUTPATIENT)
Dept: FAMILY MEDICINE | Facility: CLINIC | Age: 43
End: 2023-01-12

## 2023-01-12 NOTE — TELEPHONE ENCOUNTER
Patient Quality Outreach    Patient is due for the following:   Asthma  -  ACT needed    Next Steps:   Patient was assigned appropriate questionnaire to complete    Type of outreach:    Sent Tapp[employer] message.      Questions for provider review:    None     Bela De Guzman CMA

## 2023-01-17 ENCOUNTER — VIRTUAL VISIT (OUTPATIENT)
Dept: PSYCHOLOGY | Facility: CLINIC | Age: 43
End: 2023-01-17
Payer: COMMERCIAL

## 2023-01-17 DIAGNOSIS — F41.1 GENERALIZED ANXIETY DISORDER: Primary | ICD-10-CM

## 2023-01-17 PROCEDURE — 90832 PSYTX W PT 30 MINUTES: CPT | Mod: TEL | Performed by: SOCIAL WORKER

## 2023-01-17 NOTE — PROGRESS NOTES
"      [facility]ing                                     Progress Note    Patient Name: Santo Toure  Date: 1/17/2023         Service Type: Individual      Session Start Time: 4pm  Session End Time: 4:20pm     Session Length: 20 minutes    Session #: 20 minutes    Attendees: Client attended alone    Service Modality:  Phone Visit:      Provider verified identity through the following two step process.  Patient provided:  Patient is known previously to provider    The patient has been notified of the following:      \"We have found that certain health care needs can be provided without the need for a face to face visit.  This service lets us provide the care you need with a phone conversation.       I will have full access to your [facility] medical record during this entire phone call.   I will be taking notes for your medical record.      Since this is like an office visit, we will bill your insurance company for this service.       There are potential benefits and risks of telephone visits (e.g. limits to patient confidentiality) that differ from in-person visits.?Confidentiality still applies for telephone services, and nobody will record the visit.  It is important to be in a quiet, private space that is free of distractions (including cell phone or other devices) during the visit.??      If during the course of the call I believe a telephone visit is not appropriate, you will not be charged for this service\"     Consent has been obtained for this service by care team member: Yes     DATA  Interactive Complexity: Yes, visit entailed Interactive Complexity evidenced by:  -Use of play equipment or physical devices to overcome barriers to diagnostic or therapeutic interaction with a patient who is not fluent in the same language or who has not developed or lost expressive or receptive language skills to use or understand typical language  Crisis: No        Progress Since Last Session (Related " to Symptoms / Goals / Homework):   Symptoms: Improved    Homework: Achieved / completed to satisfaction      Episode of Care Goals: Achieved / completed to satisfaction - MAINTENANCE (Working to maintain change, with risk of relapse); Intervened by continuing to positively reinforce healthy behavior choice      Current / Ongoing Stressors and Concerns:   Client reports he has been feeling good recently. He reports he is pursuing anger management class after he was charged with a domestic assault last year. Therapist provided him with a referral.      Treatment Objective(s) Addressed in This Session:   Will identify in next session.     Intervention:   Solution focused: provided referral for anger management    Assessments completed prior to visit:  The following assessments were completed by patient for this visit:  PHQ9:   PHQ-9 SCORE 10/17/2022   PHQ-9 Total Score MyChart 17 (Moderately severe depression)   PHQ-9 Total Score 17     GAD7:   GEOVANNY-7 SCORE 11/16/2022   Total Score 0 (minimal anxiety)   Total Score 0         ASSESSMENT: Current Emotional / Mental Status (status of significant symptoms):   Risk status (Self / Other harm or suicidal ideation)   Patient denies current fears or concerns for personal safety.   Patient denies current or recent suicidal ideation or behaviors.   Patient denies current or recent homicidal ideation or behaviors.   Patient denies current or recent self injurious behavior or ideation.   Patient denies other safety concerns.   Patient reports there has been no change in risk factors since their last session.     Patient reports there has been no change in protective factors since their last session.     Recommended that patient call 911 or go to the local ED should there be a change in any of these risk factors.     Appearance:   Appropriate  SUMAYA    Eye Contact:   SUMAYA    Psychomotor Behavior: SUMAYA    Attitude:   Cooperative    Orientation:   All   Speech    Rate / Production: Normal      Volume:  Normal    Mood:    Normal   Affect:    Appropriate    Thought Content:  Clear    Thought Form:  Coherent  Logical    Insight:    Good      Medication Review:   No changes to current psychiatric medication(s)     Medication Compliance:   Yes     Changes in Health Issues:   None reported     Chemical Use Review:   Substance Use: Chemical use reviewed, no active concerns identified      Tobacco Use: No current tobacco use.      Diagnosis:  1. Generalized anxiety disorder        Collateral Reports Completed:   Not Applicable    PLAN: (Patient Tasks / Therapist Tasks / Other)  Client will follow up with [place] [facility] about anger management group and meet again in two weeks.         JYOTHI Bae, LICSW   1/17/2023                                                         ______________________________________________________________________

## 2023-02-02 ENCOUNTER — VIRTUAL VISIT (OUTPATIENT)
Dept: PSYCHOLOGY | Facility: CLINIC | Age: 43
End: 2023-02-02
Payer: COMMERCIAL

## 2023-02-02 DIAGNOSIS — F41.1 GENERALIZED ANXIETY DISORDER: Primary | ICD-10-CM

## 2023-02-02 PROCEDURE — 90832 PSYTX W PT 30 MINUTES: CPT | Mod: TEL | Performed by: SOCIAL WORKER

## 2023-02-02 PROCEDURE — 90785 PSYTX COMPLEX INTERACTIVE: CPT | Mod: TEL | Performed by: SOCIAL WORKER

## 2023-02-02 NOTE — PROGRESS NOTES
"      [facility]                                     Progress Note    Patient Name: Santo VALIENTE Aamot  Date: 2/2/2023         Service Type: Individual      Session Start Time: 4pm  Session End Time: 4:20pm     Session Length: 20 minutes    Session #: 20 minutes    Attendees: Client attended alone    Service Modality:  Phone Visit:      Provider verified identity through the following two step process.  Patient provided:  Patient is known previously to provider    Telephone Visit: The patient's condition can be safely assessed and treated via synchronous audio telemedicine encounter.      Reason for Audio Telemedicine Visit: Services only offered telehealth    Originating Site (Patient Location): Patient's home    Distant Site (Provider Location): Provider Remote Setting- Home Office    Consent:  The patient/guardian has verbally consented to:     1. The potential risks and benefits of telemedicine (telephone visit) versus in person care;    The patient has been notified of the following:      \"We have found that certain health care needs can be provided without the need for a face to face visit.  This service lets us provide the care you need with a phone conversation.       I will have full access to your [facility] medical record during this entire phone call.   I will be taking notes for your medical record.      Since this is like an office visit, we will bill your insurance company for this service.       There are potential benefits and risks of telephone visits (e.g. limits to patient confidentiality) that differ from in-person visits.?Confidentiality still applies for telephone services, and nobody will record the visit.  It is important to be in a quiet, private space that is free of distractions (including cell phone or other devices) during the visit.??      If during the course of the call I believe a telephone visit is not appropriate, you will not be charged for this service\"   "   Consent has been obtained for this service by care team member: Yes     DATA  Interactive Complexity: Yes, visit entailed Interactive Complexity evidenced by:  -Use of play equipment or physical devices to overcome barriers to diagnostic or therapeutic interaction with a patient who is not fluent in the same language or who has not developed or lost expressive or receptive language skills to use or understand typical language  Crisis: No        Progress Since Last Session (Related to Symptoms / Goals / Homework):   Symptoms: Improved    Homework: Achieved / completed to satisfaction      Episode of Care Goals: Achieved / completed to satisfaction - MAINTENANCE (Working to maintain change, with risk of relapse); Intervened by continuing to positively reinforce healthy behavior choice      Current / Ongoing Stressors and Concerns:   Client reports he has been feeling good recently. He reports he is pursuing anger management class after he was charged with a domestic assault last year. Therapist provided him with a referral.      Treatment Objective(s) Addressed in This Session:   Will identify in next session.     Intervention:   Solution focused: provided referral for anger management    Assessments completed prior to visit:  The following assessments were completed by patient for this visit:  PHQ9:   PHQ-9 SCORE 10/17/2022   PHQ-9 Total Score MyChart 17 (Moderately severe depression)   PHQ-9 Total Score 17     GAD7:   GEOVANNY-7 SCORE 11/16/2022   Total Score 0 (minimal anxiety)   Total Score 0         ASSESSMENT: Current Emotional / Mental Status (status of significant symptoms):   Risk status (Self / Other harm or suicidal ideation)   Patient denies current fears or concerns for personal safety.   Patient denies current or recent suicidal ideation or behaviors.   Patient denies current or recent homicidal ideation or behaviors.   Patient denies current or recent self injurious behavior or ideation.   Patient denies  other safety concerns.   Patient reports there has been no change in risk factors since their last session.     Patient reports there has been no change in protective factors since their last session.     Recommended that patient call 911 or go to the local ED should there be a change in any of these risk factors.     Appearance:   Appropriate  SUMAYA    Eye Contact:   SUMAYA    Psychomotor Behavior: SUMAYA    Attitude:   Cooperative    Orientation:   All   Speech    Rate / Production: Normal     Volume:  Normal    Mood:    Normal   Affect:    Appropriate    Thought Content:  Clear    Thought Form:  Coherent  Logical    Insight:    Good      Medication Review:   No changes to current psychiatric medication(s)     Medication Compliance:   Yes     Changes in Health Issues:   None reported     Chemical Use Review:   Substance Use: Chemical use reviewed, no active concerns identified      Tobacco Use: No current tobacco use.      Diagnosis:  1. Generalized anxiety disorder        Collateral Reports Completed:   Not Applicable    PLAN: (Patient Tasks / Therapist Tasks / Other)  Client asked for referral to anger management group and class, therapist placed referral for .         JYOTHI Bae, [facility]   2/2/2023                                                         ______________________________________________________________________

## 2023-02-03 ENCOUNTER — PATIENT OUTREACH (OUTPATIENT)
Dept: CARE COORDINATION | Facility: CLINIC | Age: 43
End: 2023-02-03
Payer: COMMERCIAL

## 2023-02-03 NOTE — LETTER
[facility] CARE COORDINATION  [facility]  [address]  [address]      Dear Santo,    I am a clinic community health worker who works with [facility] with the [facility]. I wanted to introduce myself and provide you with my contact information to call me with any support or resource needs you may have.  Below is a description of clinic care coordination and how we can further assist you.       The clinic care coordination team is made up of a registered nurse, , financial resource worker and community health worker who understand the health care system. The goal of clinic care coordination is to help you manage your health and improve access to the health care system. Our team works alongside your provider to assist you in determining your health and social needs. We can help you obtain health care and community resources, providing you with necessary information and education. We can work with you through any barriers and develop a care plan that helps coordinate and strengthen the communication between you and your care team.    Please feel free to contact me regarding care coordination and what we can offer.      We are focused on providing you with the highest-quality healthcare experience possible.    Sincerely,       Elmira CRAVEN  Community Health Worker  [facility] Care Coordination  [facility]  [email or web address]  [email or web address]   Office: [phone]

## 2023-02-03 NOTE — PROGRESS NOTES
Clinic Care Coordination Contact  [facility]/Voicemail    Reason for Referral: Mental Wellness (Health) (Mental Illness/Chemical Dependency)   Mental Wellness: Resources of Behavioral Health Services     MyChart message 1/24/23: Hello.  At my last telephone appointment, we talked about anger management assessment and classes.  You gave me one referral which I think was [place] [facility]. They told me they don't do assessments or classes for anger management.  I would like to do both anger management assessment AND classes. Do you have any idea who does them? I've contacted 4-5 different places and I haven't had much luck.  __________________________________    Clinical Data: Care Coordination Outreach  Outreach attempted x 1.  Left message on patient's voicemail with call back information and requested return call.  Plan: CHW will try to reach patient again in 1-2 business days.    Elmira CRAVEN  Community Health Worker  [facility]  Clinic Care Coordination  [facility]  [email or web address]   Office: [phone]

## 2023-02-06 NOTE — PROGRESS NOTES
Clinic Care Coordination Contact  [facility]/Voicemail    Clinical Data: Care Coordination Outreach  Outreach attempted x 2.  Left message on patient's voicemail with call back information and requested return call.  Plan: CHW will send care coordination introduction letter with care coordinator contact information and explanation of care coordination services via [employer]networks[employer]. CHW will do no further outreaches at this time.    Elmira CRAVEN  Community Health Worker  [facility] Care Coordination  [facility]  [email or web address]   Office: [phone]

## 2023-02-08 NOTE — PROGRESS NOTES
CHW received e-mail from pt requesting anger management assessment AND classes resources.     SW CC used Epic NowPow to generate resources. ENZO CC to send via Linqia.     LEESA Campbell   Social Work Primary Care Clinic Care Coordinator   [facility]  [phone]  [email or web address]

## 2023-02-08 NOTE — COMMUNITY RESOURCES LIST (ENGLISH)
02/08/2023   [facility]  N/A  For questions about this resource list or additional care needs, please contact your primary care clinic or care manager.  Phone: [phone]   Email: N/A   Address: [address] [place] [id number]   Hours: N/A        Personal Safety       Anger management classes  1  [facility] Distance: 0.48 miles      In-Person, Phone/Virtual   [address] [place] [address]  Language: English  Hours: Mon - Thu 8:30 AM - 9:00 PM , Fri 8:30 AM - 5:00 PM , Sat 8:30 AM - 4:30 PM  Fees: Insurance, Self Pay, Sliding Fee   Phone: (660) [phone] Email: contactus@[employer] Website: [email or web address]     2  [facility] Distance: 7.69 miles      In-Person, Phone/Virtual   [address] [facility] [address] [place] [address]  Language: English  Hours: Tue - Fri 8:00 AM - 7:00 PM , Sat 9:00 AM - 4:00 PM  Fees: Insurance, Self Pay   Phone: (587) 580-5335 Email: First Wave@[employer] Website: http://www.[employer].farmbuy/          Important Numbers & Websites       Emergency Services   [address]  [place] [address]  Poison Control   (498) 232-7463  Suicide Prevention Lifeline   (977) 740-6888 (TALK)  Child Abuse Hotline   (498) 240-8366 (4-A-Child)  Sexual Assault Hotline   (524) 110-2544 (HOPE)  National Runaway Safeline   (387) 652-3063 (RUNAWAY)  All-Options Talkline   (813) 941-8370  Substance Abuse Referral   (477) 672-5140 (HELP)

## 2023-02-28 ENCOUNTER — VIRTUAL VISIT (OUTPATIENT)
Dept: PSYCHOLOGY | Facility: CLINIC | Age: 43
End: 2023-02-28
Payer: COMMERCIAL

## 2023-02-28 DIAGNOSIS — F41.1 GENERALIZED ANXIETY DISORDER: Primary | ICD-10-CM

## 2023-02-28 PROCEDURE — 90832 PSYTX W PT 30 MINUTES: CPT | Mod: TEL | Performed by: SOCIAL WORKER

## 2023-02-28 NOTE — PROGRESS NOTES
"      [facility] Counseling                                     Progress Note    Patient Name: Santo VALIENTE Aamot  Date: 2/28/2023         Service Type: Individual      Session Start Time: 5pm  Session End Time: 5:20pm     Session Length: 20 minutes    Session #: 20 minutes    Attendees: Client attended alone    Service Modality:  Phone Visit:      Provider verified identity through the following two step process.  Patient provided:  Patient is known previously to provider    Telephone Visit: The patient's condition can be safely assessed and treated via synchronous audio telemedicine encounter.      Reason for Audio Telemedicine Visit: Services only offered telehealth    Originating Site (Patient Location): Patient's home    Distant Site (Provider Location): Provider Remote Setting- Home Office    Consent:  The patient/guardian has verbally consented to:     1. The potential risks and benefits of telemedicine (telephone visit) versus in person care;    The patient has been notified of the following:      \"We have found that certain health care needs can be provided without the need for a face to face visit.  This service lets us provide the care you need with a phone conversation.       I will have full access to your [facility] medical record during this entire phone call.   I will be taking notes for your medical record.      Since this is like an office visit, we will bill your insurance company for this service.       There are potential benefits and risks of telephone visits (e.g. limits to patient confidentiality) that differ from in-person visits.?Confidentiality still applies for telephone services, and nobody will record the visit.  It is important to be in a quiet, private space that is free of distractions (including cell phone or other devices) during the visit.??      If during the course of the call I believe a telephone visit is not appropriate, you will not be charged for this service\"   "   Consent has been obtained for this service by care team member: Yes     DATA  Interactive Complexity: Yes, visit entailed Interactive Complexity evidenced by:  -Use of play equipment or physical devices to overcome barriers to diagnostic or therapeutic interaction with a patient who is not fluent in the same language or who has not developed or lost expressive or receptive language skills to use or understand typical language  Crisis: No        Progress Since Last Session (Related to Symptoms / Goals / Homework):   Symptoms: Improved    Homework: Achieved / completed to satisfaction      Episode of Care Goals: Achieved / completed to satisfaction - MAINTENANCE (Working to maintain change, with risk of relapse); Intervened by continuing to positively reinforce healthy behavior choice      Current / Ongoing Stressors and Concerns:   Client reports he has been feeling good recently. He reports he is pursuing anger management class but is still struggling to find places with openings, was referred to social work for more support with this. Also discussed some sleep hygiene strategies.      Treatment Objective(s) Addressed in This Session:   Will identify in next session.     Intervention:   Solution focused: provided referral for anger management    Assessments completed prior to visit:  The following assessments were completed by patient for this visit:  PHQ9:   PHQ-9 SCORE 10/17/2022   PHQ-9 Total Score MyChart 17 (Moderately severe depression)   PHQ-9 Total Score 17     GAD7:   GEOVANNY-7 SCORE 11/16/2022   Total Score 0 (minimal anxiety)   Total Score 0         ASSESSMENT: Current Emotional / Mental Status (status of significant symptoms):   Risk status (Self / Other harm or suicidal ideation)   Patient denies current fears or concerns for personal safety.   Patient denies current or recent suicidal ideation or behaviors.   Patient denies current or recent homicidal ideation or behaviors.   Patient denies current or  recent self injurious behavior or ideation.   Patient denies other safety concerns.   Patient reports there has been no change in risk factors since their last session.     Patient reports there has been no change in protective factors since their last session.     Recommended that patient call 911 or go to the local ED should there be a change in any of these risk factors.     Appearance:   Appropriate  SUMAYA    Eye Contact:   SUMAYA    Psychomotor Behavior: SUMAYA    Attitude:   Cooperative    Orientation:   All   Speech    Rate / Production: Normal     Volume:  Normal    Mood:    Normal   Affect:    Appropriate    Thought Content:  Clear    Thought Form:  Coherent  Logical    Insight:    Good      Medication Review:   No changes to current psychiatric medication(s)     Medication Compliance:   Yes     Changes in Health Issues:   None reported     Chemical Use Review:   Substance Use: Chemical use reviewed, no active concerns identified      Tobacco Use: No current tobacco use.      Diagnosis:  1. Generalized anxiety disorder        Collateral Reports Completed:   Not Applicable    PLAN: (Patient Tasks / Therapist Tasks / Other)  Client asked for referral to anger management group and class, therapist placed referral for .         JYOTHI Bae, [facility]   2/28/2023                                                         ______________________________________________________________________

## 2023-04-01 ENCOUNTER — HEALTH MAINTENANCE LETTER (OUTPATIENT)
Age: 43
End: 2023-04-01

## 2023-04-12 ENCOUNTER — MYC MEDICAL ADVICE (OUTPATIENT)
Dept: FAMILY MEDICINE | Facility: CLINIC | Age: 43
End: 2023-04-12
Payer: COMMERCIAL

## 2023-04-12 DIAGNOSIS — F41.1 GENERALIZED ANXIETY DISORDER: ICD-10-CM

## 2023-04-12 ASSESSMENT — ANXIETY QUESTIONNAIRES
3. WORRYING TOO MUCH ABOUT DIFFERENT THINGS: SEVERAL DAYS
2. NOT BEING ABLE TO STOP OR CONTROL WORRYING: SEVERAL DAYS
GAD7 TOTAL SCORE: 7
1. FEELING NERVOUS, ANXIOUS, OR ON EDGE: SEVERAL DAYS
4. TROUBLE RELAXING: SEVERAL DAYS
7. FEELING AFRAID AS IF SOMETHING AWFUL MIGHT HAPPEN: SEVERAL DAYS
8. IF YOU CHECKED OFF ANY PROBLEMS, HOW DIFFICULT HAVE THESE MADE IT FOR YOU TO DO YOUR WORK, TAKE CARE OF THINGS AT HOME, OR GET ALONG WITH OTHER PEOPLE?: SOMEWHAT DIFFICULT
IF YOU CHECKED OFF ANY PROBLEMS ON THIS QUESTIONNAIRE, HOW DIFFICULT HAVE THESE PROBLEMS MADE IT FOR YOU TO DO YOUR WORK, TAKE CARE OF THINGS AT HOME, OR GET ALONG WITH OTHER PEOPLE: SOMEWHAT DIFFICULT
GAD7 TOTAL SCORE: 7
5. BEING SO RESTLESS THAT IT IS HARD TO SIT STILL: SEVERAL DAYS
7. FEELING AFRAID AS IF SOMETHING AWFUL MIGHT HAPPEN: SEVERAL DAYS
6. BECOMING EASILY ANNOYED OR IRRITABLE: SEVERAL DAYS
GAD7 TOTAL SCORE: 7

## 2023-04-12 ASSESSMENT — PATIENT HEALTH QUESTIONNAIRE - PHQ9
SUM OF ALL RESPONSES TO PHQ QUESTIONS 1-9: 11
SUM OF ALL RESPONSES TO PHQ QUESTIONS 1-9: 11
10. IF YOU CHECKED OFF ANY PROBLEMS, HOW DIFFICULT HAVE THESE PROBLEMS MADE IT FOR YOU TO DO YOUR WORK, TAKE CARE OF THINGS AT HOME, OR GET ALONG WITH OTHER PEOPLE: SOMEWHAT DIFFICULT

## 2023-04-12 NOTE — TELEPHONE ENCOUNTER
GAD7 score: 7 today     PHQ-9 score:      4/12/2023     9:00 AM   PHQ   PHQ-9 Total Score 11   Q9: Thoughts of better off dead/self-harm past 2 weeks Not at all

## 2023-04-17 RX ORDER — VENLAFAXINE HYDROCHLORIDE 75 MG/1
75 CAPSULE, EXTENDED RELEASE ORAL DAILY
Qty: 90 CAPSULE | Refills: 3 | Status: SHIPPED | OUTPATIENT
Start: 2023-04-17 | End: 2024-02-16

## 2023-05-12 ENCOUNTER — VIRTUAL VISIT (OUTPATIENT)
Dept: PSYCHOLOGY | Facility: CLINIC | Age: 43
End: 2023-05-12
Payer: COMMERCIAL

## 2023-05-12 DIAGNOSIS — F41.1 GENERALIZED ANXIETY DISORDER: Primary | ICD-10-CM

## 2023-05-12 PROCEDURE — 90832 PSYTX W PT 30 MINUTES: CPT | Mod: 93 | Performed by: SOCIAL WORKER

## 2023-05-12 PROCEDURE — 90785 PSYTX COMPLEX INTERACTIVE: CPT | Mod: 93 | Performed by: SOCIAL WORKER

## 2023-05-12 NOTE — PROGRESS NOTES
"      [facility] Counseling                                     Progress Note    Patient Name: Santo VALIENTE Aamot  Date:   5/12/2023         Service Type: Individual      Session Start Time: 8am  Session End Time: 8:20am     Session Length: 20 minutes    Session #: 20 minutes    Attendees: Client attended alone    Service Modality:  Phone Visit:      Provider verified identity through the following two step process.  Patient provided:  Patient is known previously to provider    Telephone Visit: The patient's condition can be safely assessed and treated via synchronous audio telemedicine encounter.      Reason for Audio Telemedicine Visit: Services only offered telehealth    Originating Site (Patient Location): Patient's home    Distant Site (Provider Location): Provider Remote Setting- Home Office    Consent:  The patient/guardian has verbally consented to:     1. The potential risks and benefits of telemedicine (telephone visit) versus in person care;    The patient has been notified of the following:      \"We have found that certain health care needs can be provided without the need for a face to face visit.  This service lets us provide the care you need with a phone conversation.       I will have full access to your [facility] medical record during this entire phone call.   I will be taking notes for your medical record.      Since this is like an office visit, we will bill your insurance company for this service.       There are potential benefits and risks of telephone visits (e.g. limits to patient confidentiality) that differ from in-person visits.?Confidentiality still applies for telephone services, and nobody will record the visit.  It is important to be in a quiet, private space that is free of distractions (including cell phone or other devices) during the visit.??      If during the course of the call I believe a telephone visit is not appropriate, you will not be charged for this " "service\"     Consent has been obtained for this service by care team member: Yes     DATA  Interactive Complexity: Yes, visit entailed Interactive Complexity evidenced by:  -Use of play equipment or physical devices to overcome barriers to diagnostic or therapeutic interaction with a patient who is not fluent in the same language or who has not developed or lost expressive or receptive language skills to use or understand typical language  Crisis: No        Progress Since Last Session (Related to Symptoms / Goals / Homework):   Symptoms: Improved    Homework: Achieved / completed to satisfaction      Episode of Care Goals: Achieved / completed to satisfaction - MAINTENANCE (Working to maintain change, with risk of relapse); Intervened by continuing to positively reinforce healthy behavior choice      Current / Ongoing Stressors and Concerns:   Client reports he has been feeling good recently. He reports he hasn't had luck finding any anger management classes that are taught in a way he can use an . Is working through emotions related to getting more visitation time and partial custody of his kids.      Treatment Objective(s) Addressed in This Session:   Will identify in next session.     Intervention:   Solution focused: provided referral for anger management    Assessments completed prior to visit:  The following assessments were completed by patient for this visit:  PHQ9:       10/17/2022     3:02 PM 4/12/2023     9:00 AM   PHQ-9 SCORE   PHQ-9 Total Score MyChart 17 (Moderately severe depression) 11 (Moderate depression)   PHQ-9 Total Score 17 11     GAD7:       11/16/2022     3:03 PM 4/12/2023     9:01 AM   GEOVANNY-7 SCORE   Total Score 0 (minimal anxiety) 7 (mild anxiety)   Total Score 0 7         ASSESSMENT: Current Emotional / Mental Status (status of significant symptoms):   Risk status (Self / Other harm or suicidal ideation)   Patient denies current fears or concerns for personal safety.   Patient " denies current or recent suicidal ideation or behaviors.   Patient denies current or recent homicidal ideation or behaviors.   Patient denies current or recent self injurious behavior or ideation.   Patient denies other safety concerns.   Patient reports there has been no change in risk factors since their last session.     Patient reports there has been no change in protective factors since their last session.     Recommended that patient call 911 or go to the local ED should there be a change in any of these risk factors.     Appearance:   Appropriate  SUMAYA    Eye Contact:   SUMAYA    Psychomotor Behavior: SUMAYA    Attitude:   Cooperative    Orientation:   All   Speech    Rate / Production: Normal     Volume:  Normal    Mood:    Normal   Affect:    Appropriate    Thought Content:  Clear    Thought Form:  Coherent  Logical    Insight:    Good      Medication Review:   No changes to current psychiatric medication(s)     Medication Compliance:   Yes     Changes in Health Issues:   None reported     Chemical Use Review:   Substance Use: Chemical use reviewed, no active concerns identified      Tobacco Use: No current tobacco use.      Diagnosis:  1. Generalized anxiety disorder        Collateral Reports Completed:   Not Applicable    PLAN: (Patient Tasks / Therapist Tasks / Other)  Client asked for referral to anger management group and class, therapist placed referral for .         JYOTHI Bae, [facility]   5/12/2023                                                         ______________________________________________________________________                                                           Treatment Plan    Client's Name: Santo Toure  YOB: 1980    Date: 5/12/2023    Diagnosis:  1. Generalized anxiety disorder        Referral / Collaboration:  Referral to another professional/service is not indicated at this time..    Anticipated number of session or this episode of care:  10      MeasurableTreatment Goal(s) related to diagnosis / functional impairment(s)  Goal 1: Client will experience decreased anxiety symptoms evidenced by GAD7 score below 5 and report increased ability to manage anxiety.    Objective #A (Client Action)    Status: New - Date: 5/12/2023    Client will identify stressors that contribute to anxiety.  Client will identify solutions to stressors that contribute to anxiety  Client will identify adaptive ways of coping with stressors that contribute to anxiety.  Client will use thought-stopping strategy daily to reduce intrusive thoughts.  Client will use relaxation strategies 2 times per day to reduce the physical symptoms of anxiety.      Intervention(s)  Therapist will teach CBT and DBT skills.      Patient has reviewed and agreed to the above plan.      Francesca Trinidad  May 12, 2023

## 2023-06-28 ENCOUNTER — OFFICE VISIT (OUTPATIENT)
Dept: FAMILY MEDICINE | Facility: CLINIC | Age: 43
End: 2023-06-28
Attending: PHYSICIAN ASSISTANT
Payer: COMMERCIAL

## 2023-06-28 VITALS
SYSTOLIC BLOOD PRESSURE: 146 MMHG | HEIGHT: 70 IN | HEART RATE: 97 BPM | WEIGHT: 246.8 LBS | TEMPERATURE: 98.3 F | OXYGEN SATURATION: 97 % | RESPIRATION RATE: 18 BRPM | BODY MASS INDEX: 35.33 KG/M2 | DIASTOLIC BLOOD PRESSURE: 84 MMHG

## 2023-06-28 DIAGNOSIS — Z82.49 FAMILY HISTORY OF PREMATURE CORONARY HEART DISEASE: ICD-10-CM

## 2023-06-28 DIAGNOSIS — I10 BENIGN ESSENTIAL HYPERTENSION: ICD-10-CM

## 2023-06-28 DIAGNOSIS — E66.01 MORBID OBESITY (H): ICD-10-CM

## 2023-06-28 DIAGNOSIS — F41.1 GENERALIZED ANXIETY DISORDER: ICD-10-CM

## 2023-06-28 DIAGNOSIS — R07.9 CHEST PAIN, UNSPECIFIED TYPE: Primary | ICD-10-CM

## 2023-06-28 PROCEDURE — T1013 SIGN LANG/ORAL INTERPRETER: HCPCS | Mod: U3

## 2023-06-28 PROCEDURE — 93000 ELECTROCARDIOGRAM COMPLETE: CPT | Performed by: PHYSICIAN ASSISTANT

## 2023-06-28 PROCEDURE — 99214 OFFICE O/P EST MOD 30 MIN: CPT | Performed by: PHYSICIAN ASSISTANT

## 2023-06-28 RX ORDER — LISINOPRIL 10 MG/1
10 TABLET ORAL DAILY
Qty: 90 TABLET | Refills: 3 | Status: SHIPPED | OUTPATIENT
Start: 2023-06-28 | End: 2024-04-15

## 2023-06-28 ASSESSMENT — ASTHMA QUESTIONNAIRES: ACT_TOTALSCORE: 24

## 2023-06-28 ASSESSMENT — PAIN SCALES - GENERAL: PAINLEVEL: MILD PAIN (2)

## 2023-06-28 ASSESSMENT — ENCOUNTER SYMPTOMS: NERVOUS/ANXIOUS: 1

## 2023-06-28 NOTE — PATIENT INSTRUCTIONS
Start lisinopril to high blood pressure.     Continue Effexor.     Schedule Calcium Score test.     Start Saxenda if approved and affordable.

## 2023-06-28 NOTE — PROGRESS NOTES
Assessment & Plan   Chest pain, unspecified type  Patient reports episodes of central/left-sided chest tightness without radiation to jaw or arm, occurring 1-2x/week since last week and resolving after  minutes. Does not seem to come on with exertion. Vitals are largely stable here other than for hypertension. EKG done due to his risk factors, which was unremarkable per my read. Exam with reproducible pain with palpation of the central and left side of chest. Suspect a musculoskeletal etiology at this time. Warning signs and symptoms discussed on when to return to clinic or go to the ER. Pt verbalized understanding.    - EKG 12-lead complete w/read - Clinics    Family history of premature coronary heart disease  Significant cardiac history in family with MI in mother at age 46 and in maternal grandfather at age 37.  Discussed cardiac stress testing and calcium score exam with patient and he was interested in pursuing these.   - CT Coronary Calcium Scan; Future    Benign essential hypertension  Patient's home blood pressure readings over the past week or so have been consistently elevated with SBP 140s. Discussed treatment options and initiated lisinopril.   - CT Coronary Calcium Scan; Future  - liraglutide - Weight Management (SAXENDA) 18 MG/3ML pen; Inject 0.6 mg Subcutaneous daily for 7 days, THEN 1.2 mg daily for 7 days, THEN 1.8 mg daily for 7 days, THEN 2.4 mg daily for 7 days, THEN 3 mg daily for 62 days.  - lisinopril (ZESTRIL) 10 MG tablet; Take 1 tablet (10 mg) by mouth daily    Morbid obesity (H)  Associated with HTN. Patient interested in weight loss. Discussed medication options that can be initiated to help with weight loss, patient is interested in this. Initiated Saxenda. Also discussed importance of regular exercise and healthy diet choices.   - CT Coronary Calcium Scan; Future  - liraglutide - Weight Management (SAXENDA) 18 MG/3ML pen; Inject 0.6 mg Subcutaneous daily for 7 days, THEN 1.2 mg  "daily for 7 days, THEN 1.8 mg daily for 7 days, THEN 2.4 mg daily for 7 days, THEN 3 mg daily for 62 days.    Generalized anxiety disorder  Patient endorses significant improvement in his anxiety on his current dose of Effexor (75 mg daily).     BMI:   Estimated body mass index is 35.16 kg/m  as calculated from the following:    Height as of this encounter: 1.784 m (5' 10.25\").    Weight as of this encounter: 111.9 kg (246 lb 12.8 oz).   Weight management plan: Discussed healthy diet and exercise guidelines , also initiated ELMER Lala-S2  [facility]AS [facility]    Santo ALEXANDRE RuizC  [facility]    Subjective   Santo is a 43 year old, presenting for the following health issues:  Anxiety (/)        6/28/2023     3:40 PM   Additional Questions   Roomed by Bela VELASQUEZ CMA     History of Present Illness       Mental Health Follow-up:  Patient presents to follow-up on Anxiety.    Patient's anxiety since last visit has been:  Better  The patient is not having other symptoms associated with anxiety.  Any significant life events: health concerns (worried about his blood pressure and his weight )  Patient is not feeling anxious or having panic attacks.  Patient has no concerns about alcohol or drug use.    Reason for visit:   want to discuss the possibly of getting on blood pressure meds, been getting high numbers lately with my blood pressure monitor. (Has been checking blood pressure at home )  Symptom onset:  3-4 weeks ago  Symptom intensity:  Moderate    He eats 4 or more servings of fruits and vegetables daily.He consumes 4 sweetened beverage(s) daily.He exercises with enough effort to increase his heart rate 30 to 60 minutes per day.  He exercises with enough effort to increase his heart rate 3 or less days per week.   He is taking medications regularly.       Chest Pain  Onset/Duration: About a week   Description:   Location: left side  Character: tight or " "discomfort   Radiation: moves back and forth in the middle of his chest   Duration: 30 minutes to a couple hours    Intensity: mild  Progression of Symptoms: same  Accompanying Signs & Symptoms:  Shortness of breath: No  Sweating: No  Nausea/vomiting: No  Lightheadedness: possibly once or twice not sure if it occurred with the chest pain   Palpitations: Feels like a heavy heart beat but he isn't doing anything   Fever/Chills: No  Cough: No           Heartburn: unsure   History:   Family history of heart disease: YES- Family history of heart attacks   Tobacco use: No  Previous similar symptoms: YES- Says that it has happened in the past but it was different. Thinks it was him feeling stressed but now with his medication he does not feel that way   Precipitating factors:   Worse with exertion: YES  Worse with deep breaths: YES           Related to eating: YES- Possibly with drinking soda but is unsure            Better with burping: No  Alleviating factors:   Therapies tried and outcome: none    Patient describes tightness in the central and left chest. Episodes occur 1-2x/week and last for 30 minutes to 2 hours before resolving. First noticed this last week. Is concerned because blood pressure at home has been increased when checking it over the past week, and he has family history of MI (mother at age 46, maternal grandfather at age 37).     As for anxiety, patient reports that his current dose of Effexor has been a \"huge help\" and he would like to stay on this dose. He endorses \"short phases\" of feeling depressed which resolve relatively quickly.    Patient also inquires about what his \"ideal weight\" should be given his family history of cardiac problems. He used to exercise 4-5x/week prior to Christmastime 2022 but hasn't really worked out since; he is trying to get back into an exercise routine. Additionally, patient reports poor diet with lots of pop and sugary foods and minimal fruits and vegetables.     Review " "of Systems   See HPI       Objective    BP (!) 146/84 (BP Location: Right arm, Patient Position: Sitting, Cuff Size: Adult Large)   Pulse 97   Temp 98.3  F (36.8  C) (Tympanic)   Resp 18   Ht 1.784 m (5' 10.25\")   Wt 111.9 kg (246 lb 12.8 oz)   SpO2 97%   BMI 35.16 kg/m    Body mass index is 35.16 kg/m .  Physical Exam   GENERAL: healthy, alert and no distress  RESP: lungs clear to auscultation - no rales, rhonchi or wheezes  CV: regular rate and rhythm, normal S1 S2, no S3 or S4, no murmur, click or rub, no peripheral edema and peripheral pulses strong  ABDOMEN: soft, nontender, no hepatosplenomegaly, no masses and bowel sounds normal  MS: no gross musculoskeletal defects noted, no edema  PSYCH: mentation appears normal, affect normal/bright    EKG - Reviewed and interpreted by me - appears normal, NSR, normal axis, normal intervals, no acute ST/T changes c/w ischemia, no LVH by voltage criteria, unchanged from previous tracings (compared to ECG in 2018)    Physician Attestation   I, Santo Ruiz PA-C, was present with the medical/DAKSHA student who participated in the service and in the documentation of the note.  I have verified the history and personally performed the physical exam and medical decision making.  I agree with the assessment and plan of care as documented in the note.      Santo Ruiz PA-C            "

## 2023-06-29 ENCOUNTER — TELEPHONE (OUTPATIENT)
Dept: FAMILY MEDICINE | Facility: CLINIC | Age: 43
End: 2023-06-29
Payer: COMMERCIAL

## 2023-06-29 NOTE — TELEPHONE ENCOUNTER
Prior Authorization Retail Medication Request    Medication/Dose: Saxenda 18mg   ICD code (if different than what is on RX):    Previously Tried and Failed:    Rationale:  WEIGHT LOSS MEDS NOT A CVRD BENEF PRODUCT/SERVICE NOT COVERED    Insurance Name:  [facility] kassi [phone]  Insurance ID:  [id number]      Pharmacy Information (if different than what is on RX)  Name:    Phone:

## 2023-07-05 NOTE — TELEPHONE ENCOUNTER
Medication: Saxenda 18MG/3ML pen-injectors  This medication is excluded from the pt's pharmacy benefit. No option for a pa.          Please close encounter when finished.    Thank you,  Central Prior Authorization Team  [phone]     Never smoker

## 2023-07-06 NOTE — TELEPHONE ENCOUNTER
Please call Lm. Unfortunately his insurance does not cover weight loss medicine. There is no option for an appeal either. He will have to continue trying lifestyle changes at this time. We can discuss again at a future appointment.     Santo Ruiz PA-C

## 2023-07-06 NOTE — TELEPHONE ENCOUNTER
Called left message on identified VM requesting patient call care team back re: see below message from Lm PAYNE.    Julie Behrendt RN

## 2023-08-01 ENCOUNTER — VIRTUAL VISIT (OUTPATIENT)
Dept: PSYCHOLOGY | Facility: CLINIC | Age: 43
End: 2023-08-01
Payer: COMMERCIAL

## 2023-08-01 DIAGNOSIS — F41.1 GENERALIZED ANXIETY DISORDER: Primary | ICD-10-CM

## 2023-08-01 PROCEDURE — 90834 PSYTX W PT 45 MINUTES: CPT | Mod: TEL | Performed by: SOCIAL WORKER

## 2023-08-01 PROCEDURE — 90785 PSYTX COMPLEX INTERACTIVE: CPT | Mod: TEL | Performed by: SOCIAL WORKER

## 2023-08-01 NOTE — PROGRESS NOTES
"      [facility]                                     Progress Note    Patient Name: Santo VALIENTE Aamot  Date:   8/1/2023         Service Type: Individual      Session Start Time: 4pm  Session End Time: 4:20pm     Session Length: 20 minutes    Session #: 20 minutes    Attendees: Client attended alone    Service Modality:  Phone Visit:      Provider verified identity through the following two step process.  Patient provided:  Patient is known previously to provider    Telephone Visit: The patient's condition can be safely assessed and treated via synchronous audio telemedicine encounter.      Reason for Audio Telemedicine Visit: Services only offered telehealth    Originating Site (Patient Location): Patient's home    Distant Site (Provider Location): Provider Remote Setting- Home Office    Consent:  The patient/guardian has verbally consented to:     1. The potential risks and benefits of telemedicine (telephone visit) versus in person care;    The patient has been notified of the following:      \"We have found that certain health care needs can be provided without the need for a face to face visit.  This service lets us provide the care you need with a phone conversation.       I will have full access to your [facility] medical record during this entire phone call.   I will be taking notes for your medical record.      Since this is like an office visit, we will bill your insurance company for this service.       There are potential benefits and risks of telephone visits (e.g. limits to patient confidentiality) that differ from in-person visits.?Confidentiality still applies for telephone services, and nobody will record the visit.  It is important to be in a quiet, private space that is free of distractions (including cell phone or other devices) during the visit.??      If during the course of the call I believe a telephone visit is not appropriate, you will not be charged for this service\"   "   Consent has been obtained for this service by care team member: Yes     DATA  Interactive Complexity: Yes, visit entailed Interactive Complexity evidenced by:  -Use of play equipment or physical devices to overcome barriers to diagnostic or therapeutic interaction with a patient who is not fluent in the same language or who has not developed or lost expressive or receptive language skills to use or understand typical language  Crisis: No        Progress Since Last Session (Related to Symptoms / Goals / Homework):   Symptoms:  Improved    Homework: Achieved / completed to satisfaction      Episode of Care Goals: Achieved / completed to satisfaction - MAINTENANCE (Working to maintain change, with risk of relapse); Intervened by continuing to positively reinforce healthy behavior choice      Current / Ongoing Stressors and Concerns:   Client reports he has been feeling good recently. He reports he has finally found anger management classes that are taught in a way he can use an . Is working through emotions related to getting more visitation time and partial custody of his kids. Reports he had a difficult day last week after court not going in his favor. Reports he is taking care of himself by going to the gym, eating healthier, and starting to read again.     Treatment Objective(s) Addressed in This Session:   Will identify in next session.     Intervention:   Solution focused: provided referral for anger management    Assessments completed prior to visit:  The following assessments were completed by patient for this visit:  PHQ9:       10/17/2022     3:02 PM 4/12/2023     9:00 AM   PHQ-9 SCORE   PHQ-9 Total Score MyChart 17 (Moderately severe depression) 11 (Moderate depression)   PHQ-9 Total Score 17 11     GAD7:       11/16/2022     3:03 PM 4/12/2023     9:01 AM   GEOVANNY-7 SCORE   Total Score 0 (minimal anxiety) 7 (mild anxiety)   Total Score 0 7         ASSESSMENT: Current Emotional / Mental Status  (status of significant symptoms):   Risk status (Self / Other harm or suicidal ideation)   Patient denies current fears or concerns for personal safety.   Patient denies current or recent suicidal ideation or behaviors.   Patient denies current or recent homicidal ideation or behaviors.   Patient denies current or recent self injurious behavior or ideation.   Patient denies other safety concerns.   Patient reports there has been no change in risk factors since their last session.     Patient reports there has been no change in protective factors since their last session.     Recommended that patient call 911 or go to the local ED should there be a change in any of these risk factors.     Appearance:   Appropriate  SUMAYA    Eye Contact:   SUMAYA    Psychomotor Behavior: SUMAYA    Attitude:   Cooperative    Orientation:   All   Speech    Rate / Production: Normal     Volume:  Normal    Mood:    Normal   Affect:    Appropriate    Thought Content:  Clear    Thought Form:  Coherent  Logical    Insight:    Good      Medication Review:   No changes to current psychiatric medication(s)     Medication Compliance:   Yes     Changes in Health Issues:   None reported     Chemical Use Review:   Substance Use: Chemical use reviewed, no active concerns identified      Tobacco Use: No current tobacco use.      Diagnosis:  1. Generalized anxiety disorder        Collateral Reports Completed:   Not Applicable    PLAN: (Patient Tasks / Therapist Tasks / Other)  Client to continue anger management group and class        JYOTHI Bae, LICSW   8/1/2023                                                         ______________________________________________________________________                                                           Treatment Plan    Client's Name: Santo Allisonrebecca  YOB: 1980    Date: 5/12/2023    Diagnosis:  1. Generalized anxiety disorder        Referral / Collaboration:  Referral to another  professional/service is not indicated at this time..    Anticipated number of session or this episode of care: 10      MeasurableTreatment Goal(s) related to diagnosis / functional impairment(s)  Goal 1: Client will experience decreased anxiety symptoms evidenced by GAD7 score below 5 and report increased ability to manage anxiety.    Objective #A (Client Action)    Status: New - Date: 5/12/2023    Client will identify stressors that contribute to anxiety.  Client will identify solutions to stressors that contribute to anxiety  Client will identify adaptive ways of coping with stressors that contribute to anxiety.  Client will use thought-stopping strategy daily to reduce intrusive thoughts.  Client will use relaxation strategies 2 times per day to reduce the physical symptoms of anxiety.      Intervention(s)  Therapist will teach CBT and DBT skills.      Patient has reviewed and agreed to the above plan.      Francesca Trinidad  May 12, 2023

## 2023-09-14 ENCOUNTER — MYC MEDICAL ADVICE (OUTPATIENT)
Dept: FAMILY MEDICINE | Facility: CLINIC | Age: 43
End: 2023-09-14
Payer: COMMERCIAL

## 2023-09-14 DIAGNOSIS — J06.9 VIRAL URI WITH COUGH: ICD-10-CM

## 2023-09-15 RX ORDER — ALBUTEROL SULFATE 90 UG/1
AEROSOL, METERED RESPIRATORY (INHALATION)
Qty: 18 G | Refills: 0 | Status: SHIPPED | OUTPATIENT
Start: 2023-09-15 | End: 2023-12-10

## 2023-09-15 NOTE — TELEPHONE ENCOUNTER
Lm- please review for Albuterol refill. See [employer] message- sx seasonal allergies.  Rx pended.   LONDON Diop RN

## 2023-09-15 NOTE — TELEPHONE ENCOUNTER
Albuterol inhaler, associated Dx viral URI with cough  Last Written Prescription Date:  12-13-21  Last Fill Quantity: 18 G,  # refills: 0   Last office visit: 6/28/2023

## 2023-11-03 ENCOUNTER — OFFICE VISIT (OUTPATIENT)
Dept: URGENT CARE | Facility: URGENT CARE | Age: 43
End: 2023-11-03
Payer: COMMERCIAL

## 2023-11-03 ENCOUNTER — ANCILLARY PROCEDURE (OUTPATIENT)
Dept: GENERAL RADIOLOGY | Facility: CLINIC | Age: 43
End: 2023-11-03
Attending: FAMILY MEDICINE
Payer: COMMERCIAL

## 2023-11-03 VITALS
WEIGHT: 190 LBS | SYSTOLIC BLOOD PRESSURE: 125 MMHG | TEMPERATURE: 98.6 F | OXYGEN SATURATION: 97 % | BODY MASS INDEX: 27.07 KG/M2 | DIASTOLIC BLOOD PRESSURE: 81 MMHG | HEART RATE: 68 BPM

## 2023-11-03 DIAGNOSIS — R10.32 ABDOMINAL PAIN, LEFT LOWER QUADRANT: ICD-10-CM

## 2023-11-03 DIAGNOSIS — R10.31 RLQ ABDOMINAL PAIN: ICD-10-CM

## 2023-11-03 DIAGNOSIS — K59.00 CONSTIPATION, UNSPECIFIED CONSTIPATION TYPE: ICD-10-CM

## 2023-11-03 DIAGNOSIS — R10.9 ACUTE RIGHT FLANK PAIN: Primary | ICD-10-CM

## 2023-11-03 LAB
ALBUMIN SERPL BCG-MCNC: 4.3 G/DL (ref 3.5–5.2)
ALBUMIN UR-MCNC: NEGATIVE MG/DL
ALP SERPL-CCNC: 67 U/L (ref 40–129)
ALT SERPL W P-5'-P-CCNC: 21 U/L (ref 0–70)
ANION GAP SERPL CALCULATED.3IONS-SCNC: 12 MMOL/L (ref 7–15)
APPEARANCE UR: CLEAR
AST SERPL W P-5'-P-CCNC: 20 U/L (ref 0–45)
BASOPHILS # BLD AUTO: 0.1 10E3/UL (ref 0–0.2)
BASOPHILS NFR BLD AUTO: 1 %
BILIRUB SERPL-MCNC: 0.3 MG/DL
BILIRUB UR QL STRIP: NEGATIVE
BUN SERPL-MCNC: 15.3 MG/DL (ref 6–20)
CALCIUM SERPL-MCNC: 9.3 MG/DL (ref 8.6–10)
CHLORIDE SERPL-SCNC: 102 MMOL/L (ref 98–107)
COLOR UR AUTO: YELLOW
CREAT SERPL-MCNC: 0.82 MG/DL (ref 0.67–1.17)
DEPRECATED HCO3 PLAS-SCNC: 28 MMOL/L (ref 22–29)
EGFRCR SERPLBLD CKD-EPI 2021: >90 ML/MIN/1.73M2
EOSINOPHIL # BLD AUTO: 0.2 10E3/UL (ref 0–0.7)
EOSINOPHIL NFR BLD AUTO: 2 %
ERYTHROCYTE [DISTWIDTH] IN BLOOD BY AUTOMATED COUNT: 13.2 % (ref 10–15)
GLUCOSE SERPL-MCNC: 70 MG/DL (ref 70–99)
GLUCOSE UR STRIP-MCNC: NEGATIVE MG/DL
HCT VFR BLD AUTO: 47.4 % (ref 40–53)
HGB BLD-MCNC: 14.6 G/DL (ref 13.3–17.7)
HGB UR QL STRIP: NEGATIVE
IMM GRANULOCYTES # BLD: 0 10E3/UL
IMM GRANULOCYTES NFR BLD: 0 %
KETONES UR STRIP-MCNC: NEGATIVE MG/DL
LEUKOCYTE ESTERASE UR QL STRIP: NEGATIVE
LYMPHOCYTES # BLD AUTO: 1.9 10E3/UL (ref 0.8–5.3)
LYMPHOCYTES NFR BLD AUTO: 23 %
MCH RBC QN AUTO: 27.2 PG (ref 26.5–33)
MCHC RBC AUTO-ENTMCNC: 30.8 G/DL (ref 31.5–36.5)
MCV RBC AUTO: 88 FL (ref 78–100)
MONOCYTES # BLD AUTO: 0.5 10E3/UL (ref 0–1.3)
MONOCYTES NFR BLD AUTO: 6 %
NEUTROPHILS # BLD AUTO: 5.8 10E3/UL (ref 1.6–8.3)
NEUTROPHILS NFR BLD AUTO: 68 %
NITRATE UR QL: NEGATIVE
PH UR STRIP: 6 [PH] (ref 5–7)
PLATELET # BLD AUTO: 150 10E3/UL (ref 150–450)
POTASSIUM SERPL-SCNC: 3.5 MMOL/L (ref 3.4–5.3)
PROT SERPL-MCNC: 6.9 G/DL (ref 6.4–8.3)
RBC # BLD AUTO: 5.36 10E6/UL (ref 4.4–5.9)
SODIUM SERPL-SCNC: 142 MMOL/L (ref 135–145)
SP GR UR STRIP: <=1.005 (ref 1–1.03)
UROBILINOGEN UR STRIP-ACNC: 0.2 E.U./DL
WBC # BLD AUTO: 8.5 10E3/UL (ref 4–11)

## 2023-11-03 PROCEDURE — 85025 COMPLETE CBC W/AUTO DIFF WBC: CPT | Performed by: FAMILY MEDICINE

## 2023-11-03 PROCEDURE — 36415 COLL VENOUS BLD VENIPUNCTURE: CPT | Performed by: FAMILY MEDICINE

## 2023-11-03 PROCEDURE — 80053 COMPREHEN METABOLIC PANEL: CPT | Performed by: FAMILY MEDICINE

## 2023-11-03 PROCEDURE — 74019 RADEX ABDOMEN 2 VIEWS: CPT | Mod: TC | Performed by: RADIOLOGY

## 2023-11-03 PROCEDURE — 99214 OFFICE O/P EST MOD 30 MIN: CPT | Performed by: FAMILY MEDICINE

## 2023-11-03 PROCEDURE — 81003 URINALYSIS AUTO W/O SCOPE: CPT | Performed by: FAMILY MEDICINE

## 2023-11-03 ASSESSMENT — PAIN SCALES - GENERAL: PAINLEVEL: EXTREME PAIN (8)

## 2023-11-03 NOTE — LETTER
November 3, 2023      Santo Toure  [address]  [place] [address]        To Whom It May Concern:    Santo Toure  was seen on November 3, 2023. Because of his current medical problem, please excuse his absence from work on November 3, 2023.   He may return to work on November 6, 2023, provided that he is feeling better.        Sincerely,        Obinna Christianson MD

## 2023-11-03 NOTE — PROGRESS NOTES
"Note:  This encounter was done with the aid of a video .    SUBJECTIVE:   Santo Toure is a 43 year old male presenting with a chief complaint of constipation , gradual mild off-and-on pain at the right lower lower back, lower abdomen (both sides, but more on the right side).  Onset of symptoms was off and on for the past two weeks ago.    Course of illness is worsening over the past four days.  He had a normal stool five days ago; however, the next stool was yesterday, and it was a very small amount.  No blood in/on the stools.      Treatment measures tried include Patient has tried the laxative Dulcolax last night without relief. .He also has been drinking metamucil.    Predisposing factors include none. .  No dietary changes  No new medications  No vomiting  He does not consume iron supplements and does not eat a lot of dairy products.    Patient takes supplements for exercising (Creatine and \"Pyobiotics\").    No urinary problems such blood in the urine.  No decreased urine production. No pain with urination.      Past Medical History:   Diagnosis Date    Asthma     seasonal, spring and fall    Deaf     Kidney stone 09/14/2017    had R side stent placed and then on 10/5/2017 had lithitripsy    PONV (postoperative nausea and vomiting)      Current Outpatient Medications   Medication Sig Dispense Refill    albuterol (PROAIR HFA/PROVENTIL HFA/VENTOLIN HFA) 108 (90 Base) MCG/ACT inhaler Inhale 2 puffs every 4-6 hours as needed for cough, wheezing, or shortness of breath 18 g 0    lisinopril (ZESTRIL) 10 MG tablet Take 1 tablet (10 mg) by mouth daily 90 tablet 3    venlafaxine (EFFEXOR XR) 75 MG 24 hr capsule Take 1 capsule (75 mg) by mouth daily 90 capsule 3    acetaminophen (TYLENOL) 500 MG tablet Take 2 tablets (1,000 mg) by mouth every 6 hours as needed for mild pain or fever (Patient not taking: Reported on 6/28/2023) 1 Bottle 0    IBUPROFEN PO Take 800 mg by mouth every 8 " hours as needed for moderate pain  (Patient not taking: Reported on 11/3/2023)       Social History     Tobacco Use    Smoking status: Never    Smokeless tobacco: Never   Substance Use Topics    Alcohol use: Yes     Comment: on occasion       ROS:  CONSTITUTIONAL:negative for fevers.   GI: positive for constipation.    :  negative for urinary problems.     OBJECTIVE:  /81 (BP Location: Right arm, Patient Position: Sitting, Cuff Size: Adult Regular)   Pulse 68   Temp 98.6  F (37  C) (Oral)   Wt 86.2 kg (190 lb)   SpO2 97%   BMI 27.07 kg/m    GENERAL APPEARANCE: healthy, alert and no distress.  Patient is sitting comfortably in the chair.    ABDOMEN: soft.  Normal Bowel Sounds. No distension.  No rebound/guarding.  There is some tenderness over the right upper quadrant, periumbilical region and at the right lower quadrant.    BACK:  No pain with percussion over the costovertebral angles.    SKIN: no jaundice.      LABS:    Results for orders placed or performed in visit on 11/03/23   XR Abdomen 2 Views     Status: None    Narrative    XR ABDOMEN 2 VIEWS 11/3/2023 10:46 AM    HISTORY: Abdominal pain, left lower quadrant; RLQ abdominal pain    COMPARISON: None.      Impression    IMPRESSION: No evidence of obstruction. Mild stool noted within the  descending colon and sigmoid colon. No evidence of renal stones. No  free air.    MARIO CAMACHO MD         SYSTEM ID:  [id number]   Results for orders placed or performed in visit on 11/03/23   UA Macroscopic with reflex to Microscopic and Culture - Clinic Collect     Status: Normal    Specimen: Urine, Midstream   Result Value Ref Range    Color Urine Yellow Colorless, Straw, Light Yellow, Yellow    Appearance Urine Clear Clear    Glucose Urine Negative Negative mg/dL    Bilirubin Urine Negative Negative    Ketones Urine Negative Negative mg/dL    Specific Gravity Urine <=1.005 1.003 - 1.035    Blood Urine Negative Negative    pH Urine 6.0 5.0 - 7.0    Protein  Albumin Urine Negative Negative mg/dL    Urobilinogen Urine 0.2 0.2, 1.0 E.U./dL    Nitrite Urine Negative Negative    Leukocyte Esterase Urine Negative Negative    Narrative    Microscopic not indicated   CBC with platelets and differential     Status: Abnormal   Result Value Ref Range    WBC Count 8.5 4.0 - 11.0 10e3/uL    RBC Count 5.36 4.40 - 5.90 10e6/uL    Hemoglobin 14.6 13.3 - 17.7 g/dL    Hematocrit 47.4 40.0 - 53.0 %    MCV 88 78 - 100 fL    MCH 27.2 26.5 - 33.0 pg    MCHC 30.8 (L) 31.5 - 36.5 g/dL    RDW 13.2 10.0 - 15.0 %    Platelet Count 150 150 - 450 10e3/uL    % Neutrophils 68 %    % Lymphocytes 23 %    % Monocytes 6 %    % Eosinophils 2 %    % Basophils 1 %    % Immature Granulocytes 0 %    Absolute Neutrophils 5.8 1.6 - 8.3 10e3/uL    Absolute Lymphocytes 1.9 0.8 - 5.3 10e3/uL    Absolute Monocytes 0.5 0.0 - 1.3 10e3/uL    Absolute Eosinophils 0.2 0.0 - 0.7 10e3/uL    Absolute Basophils 0.1 0.0 - 0.2 10e3/uL    Absolute Immature Granulocytes 0.0 <=0.4 10e3/uL   CBC with platelets and differential     Status: Abnormal    Narrative    The following orders were created for panel order CBC with platelets and differential.  Procedure                               Abnormality         Status                     ---------                               -----------         ------                     CBC with platelets and d...[[id number]]  Abnormal            Final result                 Please view results for these tests on the individual orders.         X-rays:  I viewed all X-rays during this clinic encounter.  The X-rays of the abdomen showed small amount of stool in the right descending and sigmoid regions of the colon.  No large obstructions were seen.  No abnormal masses and no free air under the diaphragm.  .      ASSESSMENT:  Acute constipation  Acute right flank pain  LLQ pain  RLQ pain.    The complete blood cell count is mostly within normal limits.   Today's UA was within normal limits, ruling  out urinary tract infection such as pyelonephritis.  Nephrolithiasis is less likely given the absence of hematuria on the UA.   The X-ray showed small amounts of stool but  no major obstruction in the intestines.      PLAN:  Continue to drink plenty of liquids to soften the stool    Rx;  Glycerin suppositories.      Eat a fiber-rich diet.    Continue to exercise a lot to stimulate movement of the intestines.      Follow up if not better in 5 days.      Continue the Metamucil and the Dulcolax.      Pending lab:  comprehensive metabolic panel     I spent more than 40 minutes on this case.     Obinna Christianson MD

## 2023-11-03 NOTE — PATIENT INSTRUCTIONS
Continue to drink plenty of liquids to soften the stool    Eat a fiber-rich diet.    Continue to exercise a lot to stimulate movement of the intestines.      Follow up if not better in 5 days.      Continue the Metamucil and the Dulcolax.

## 2023-11-06 ENCOUNTER — OFFICE VISIT (OUTPATIENT)
Dept: PEDIATRICS | Facility: CLINIC | Age: 43
End: 2023-11-06
Attending: FAMILY MEDICINE
Payer: COMMERCIAL

## 2023-11-06 ENCOUNTER — OFFICE VISIT (OUTPATIENT)
Dept: URGENT CARE | Facility: URGENT CARE | Age: 43
End: 2023-11-06
Payer: COMMERCIAL

## 2023-11-06 ENCOUNTER — HOSPITAL ENCOUNTER (OUTPATIENT)
Dept: CT IMAGING | Facility: CLINIC | Age: 43
Discharge: HOME | End: 2023-11-06
Attending: PHYSICIAN ASSISTANT | Admitting: PHYSICIAN ASSISTANT
Payer: COMMERCIAL

## 2023-11-06 VITALS
WEIGHT: 190 LBS | DIASTOLIC BLOOD PRESSURE: 80 MMHG | RESPIRATION RATE: 18 BRPM | TEMPERATURE: 98 F | BODY MASS INDEX: 27.07 KG/M2 | SYSTOLIC BLOOD PRESSURE: 115 MMHG | OXYGEN SATURATION: 99 % | HEART RATE: 87 BPM

## 2023-11-06 VITALS
TEMPERATURE: 98 F | SYSTOLIC BLOOD PRESSURE: 146 MMHG | RESPIRATION RATE: 20 BRPM | OXYGEN SATURATION: 98 % | HEART RATE: 78 BPM | DIASTOLIC BLOOD PRESSURE: 83 MMHG

## 2023-11-06 DIAGNOSIS — R10.30 LOWER ABDOMINAL PAIN: ICD-10-CM

## 2023-11-06 DIAGNOSIS — E87.6 HYPOKALEMIA: ICD-10-CM

## 2023-11-06 DIAGNOSIS — R10.84 GENERALIZED ABDOMINAL PAIN: Primary | ICD-10-CM

## 2023-11-06 DIAGNOSIS — E66.3 OVERWEIGHT WITH BODY MASS INDEX (BMI) 25.0-29.9: ICD-10-CM

## 2023-11-06 DIAGNOSIS — K59.00 CONSTIPATION, UNSPECIFIED CONSTIPATION TYPE: Primary | ICD-10-CM

## 2023-11-06 DIAGNOSIS — R10.84 GENERALIZED ABDOMINAL PAIN: ICD-10-CM

## 2023-11-06 DIAGNOSIS — Z87.39 HISTORY OF GOUT: ICD-10-CM

## 2023-11-06 DIAGNOSIS — K59.00 CONSTIPATION, UNSPECIFIED CONSTIPATION TYPE: ICD-10-CM

## 2023-11-06 DIAGNOSIS — Z87.442 HISTORY OF KIDNEY STONES: ICD-10-CM

## 2023-11-06 DIAGNOSIS — E83.59 CALCIUM OXALATE CALCULUS: ICD-10-CM

## 2023-11-06 PROBLEM — N39.0 COMPLICATED UTI (URINARY TRACT INFECTION): Status: RESOLVED | Noted: 2017-10-07 | Resolved: 2023-11-06

## 2023-11-06 PROBLEM — N12 PYELONEPHRITIS: Status: RESOLVED | Noted: 2017-10-07 | Resolved: 2023-11-06

## 2023-11-06 PROBLEM — N20.0 KIDNEY STONE: Status: RESOLVED | Noted: 2017-09-13 | Resolved: 2023-11-06

## 2023-11-06 PROBLEM — J45.20 MILD INTERMITTENT ASTHMA WITHOUT COMPLICATION: Status: ACTIVE | Noted: 2020-01-09

## 2023-11-06 PROBLEM — E66.01 MORBID OBESITY (H): Status: RESOLVED | Noted: 2023-06-28 | Resolved: 2023-11-06

## 2023-11-06 PROBLEM — J45.21 MILD INTERMITTENT ASTHMA WITH ACUTE EXACERBATION: Status: RESOLVED | Noted: 2020-01-09 | Resolved: 2023-11-06

## 2023-11-06 LAB
ALBUMIN SERPL BCG-MCNC: 4.3 G/DL (ref 3.5–5.2)
ALBUMIN UR-MCNC: NEGATIVE MG/DL
ALP SERPL-CCNC: 68 U/L (ref 40–129)
ALT SERPL W P-5'-P-CCNC: 21 U/L (ref 0–70)
ANION GAP SERPL CALCULATED.3IONS-SCNC: 9 MMOL/L (ref 7–15)
APPEARANCE UR: CLEAR
AST SERPL W P-5'-P-CCNC: 18 U/L (ref 0–45)
BASOPHILS # BLD AUTO: 0.1 10E3/UL (ref 0–0.2)
BASOPHILS NFR BLD AUTO: 1 %
BILIRUB SERPL-MCNC: 0.2 MG/DL
BILIRUB UR QL STRIP: NEGATIVE
BUN SERPL-MCNC: 12.2 MG/DL (ref 6–20)
CALCIUM SERPL-MCNC: 9 MG/DL (ref 8.6–10)
CHLORIDE SERPL-SCNC: 102 MMOL/L (ref 98–107)
COLOR UR AUTO: ABNORMAL
CREAT SERPL-MCNC: 0.75 MG/DL (ref 0.67–1.17)
CRP SERPL-MCNC: <3 MG/L
DEPRECATED HCO3 PLAS-SCNC: 27 MMOL/L (ref 22–29)
EGFRCR SERPLBLD CKD-EPI 2021: >90 ML/MIN/1.73M2
EOSINOPHIL # BLD AUTO: 0.2 10E3/UL (ref 0–0.7)
EOSINOPHIL NFR BLD AUTO: 2 %
ERYTHROCYTE [DISTWIDTH] IN BLOOD BY AUTOMATED COUNT: 13.2 % (ref 10–15)
ERYTHROCYTE [SEDIMENTATION RATE] IN BLOOD BY WESTERGREN METHOD: 6 MM/HR (ref 0–15)
GLUCOSE SERPL-MCNC: 95 MG/DL (ref 70–99)
GLUCOSE UR STRIP-MCNC: NEGATIVE MG/DL
HCT VFR BLD AUTO: 46.1 % (ref 40–53)
HGB BLD-MCNC: 15.2 G/DL (ref 13.3–17.7)
HGB UR QL STRIP: NEGATIVE
IMM GRANULOCYTES # BLD: 0 10E3/UL
IMM GRANULOCYTES NFR BLD: 0 %
KETONES UR STRIP-MCNC: NEGATIVE MG/DL
LEUKOCYTE ESTERASE UR QL STRIP: NEGATIVE
LIPASE SERPL-CCNC: 33 U/L (ref 13–60)
LYMPHOCYTES # BLD AUTO: 2.4 10E3/UL (ref 0.8–5.3)
LYMPHOCYTES NFR BLD AUTO: 28 %
MCH RBC QN AUTO: 28.3 PG (ref 26.5–33)
MCHC RBC AUTO-ENTMCNC: 33 G/DL (ref 31.5–36.5)
MCV RBC AUTO: 86 FL (ref 78–100)
MONOCYTES # BLD AUTO: 0.7 10E3/UL (ref 0–1.3)
MONOCYTES NFR BLD AUTO: 8 %
MUCOUS THREADS #/AREA URNS LPF: PRESENT /LPF
NEUTROPHILS # BLD AUTO: 5.2 10E3/UL (ref 1.6–8.3)
NEUTROPHILS NFR BLD AUTO: 61 %
NITRATE UR QL: NEGATIVE
NRBC # BLD AUTO: 0 10E3/UL
NRBC BLD AUTO-RTO: 0 /100
PH UR STRIP: 6 [PH] (ref 5–7)
PLATELET # BLD AUTO: 163 10E3/UL (ref 150–450)
POTASSIUM SERPL-SCNC: 3.3 MMOL/L (ref 3.4–5.3)
PROT SERPL-MCNC: 6.7 G/DL (ref 6.4–8.3)
RBC # BLD AUTO: 5.38 10E6/UL (ref 4.4–5.9)
RBC URINE: <1 /HPF
SODIUM SERPL-SCNC: 138 MMOL/L (ref 135–145)
SP GR UR STRIP: 1.02 (ref 1–1.03)
SQUAMOUS EPITHELIAL: <1 /HPF
UROBILINOGEN UR STRIP-MCNC: NORMAL MG/DL
WBC # BLD AUTO: 8.5 10E3/UL (ref 4–11)
WBC URINE: 1 /HPF

## 2023-11-06 PROCEDURE — 250N000009 HC RX 250: Performed by: PHYSICIAN ASSISTANT

## 2023-11-06 PROCEDURE — 250N000011 HC RX IP 250 OP 636: Performed by: PHYSICIAN ASSISTANT

## 2023-11-06 PROCEDURE — 80053 COMPREHEN METABOLIC PANEL: CPT | Performed by: PHYSICIAN ASSISTANT

## 2023-11-06 PROCEDURE — 36415 COLL VENOUS BLD VENIPUNCTURE: CPT | Performed by: PHYSICIAN ASSISTANT

## 2023-11-06 PROCEDURE — 85652 RBC SED RATE AUTOMATED: CPT | Performed by: PHYSICIAN ASSISTANT

## 2023-11-06 PROCEDURE — 81001 URINALYSIS AUTO W/SCOPE: CPT | Performed by: PHYSICIAN ASSISTANT

## 2023-11-06 PROCEDURE — 99215 OFFICE O/P EST HI 40 MIN: CPT | Performed by: PHYSICIAN ASSISTANT

## 2023-11-06 PROCEDURE — 86140 C-REACTIVE PROTEIN: CPT | Performed by: PHYSICIAN ASSISTANT

## 2023-11-06 PROCEDURE — 74177 CT ABD & PELVIS W/CONTRAST: CPT

## 2023-11-06 PROCEDURE — 85025 COMPLETE CBC W/AUTO DIFF WBC: CPT | Performed by: PHYSICIAN ASSISTANT

## 2023-11-06 PROCEDURE — 99207 REFERRAL TO ACUTE AND DIAGNOSTIC SERVICES: CPT | Performed by: FAMILY MEDICINE

## 2023-11-06 PROCEDURE — 83690 ASSAY OF LIPASE: CPT | Performed by: PHYSICIAN ASSISTANT

## 2023-11-06 RX ORDER — IOPAMIDOL 755 MG/ML
500 INJECTION, SOLUTION INTRAVASCULAR ONCE
Status: COMPLETED | OUTPATIENT
Start: 2023-11-06 | End: 2023-11-06

## 2023-11-06 RX ADMIN — SODIUM CHLORIDE 63 ML: 9 INJECTION, SOLUTION INTRAVENOUS at 16:13

## 2023-11-06 RX ADMIN — IOPAMIDOL 96 ML: 755 INJECTION, SOLUTION INTRAVENOUS at 16:13

## 2023-11-06 NOTE — Clinical Note
Dr. Birmingham- this very nice patient will be seeing you virtually with  Friday (doesn't have PCP - but hoping to establish) - suspect symptoms due to constipation.  No hx but has lost ~50 lbs since the summer with diet/exercise.  Doing bowel clean out and follow up with Dr. Loera if not improving.

## 2023-11-06 NOTE — RESULT ENCOUNTER NOTE
Results discussed directly with patient while patient was present. Any further details documented in the note.   Vandana Sousa PA-C

## 2023-11-06 NOTE — LETTER
[facility]  [address]  [address]  [facility] [address]  [phone]       November 6, 2023    Santo Toure  [address]  [place] [address]    To Whom it May Concern:    The above patient is unable to attend work 11/3/2023-11/11/2023 due to a medical issue. Anticipate return 11/13/2023 pending no further complications.  Please contact me with questions or concerns.      Sincerely,    Vandana Sousa MBA, MS, PA-C  [facility]

## 2023-11-06 NOTE — PROGRESS NOTES
Assessment & Plan     Generalized abdominal pain  - Referral to Acute and Diagnostic Services (Day of diagnostic / First order acute)     8/10 lower abd pain showing no improvement in last few days since initial evaluation, I recommended [facility] evaluation there they can perform blood work and advanced imaging to investigate further to rule out acute processes including but not limited to diverticulitis/appendicitis/enteritis . He is agreeable to this plan and patient was accepted for care at [facility] location, he will go directly there with his friend.     Sam Brown MD   [facility]    Subjective     Santo is a 43 year old male who presents to clinic today for the following health issues:  Chief Complaint   Patient presents with    Urgent Care     Pt was here last Friday back pain is getting worse 8/10 PAIN      HPI    Sent home from work having primarily lower abd pain in addition to R lower back pain which continues.   Seen in UC 3 days ago. No new vomiting/diarrhea.   Has had no improvement since 3 days his abdominal pain has been constant. Was given laxative, no relief.     Sign language video service was used to facilitate this visit      Patient Active Problem List    Diagnosis Date Noted    Morbid obesity (H) 06/28/2023     Priority: Medium    Family history of premature coronary heart disease 06/28/2023     Priority: Medium    Benign essential hypertension 06/28/2023     Priority: Medium    Generalized anxiety disorder 11/16/2022     Priority: Medium    Mild intermittent asthma with acute exacerbation 01/09/2020     Priority: Medium    Pyelonephritis 10/07/2017     Priority: Medium    Complicated UTI (urinary tract infection) 10/07/2017     Priority: Medium    Kidney stone 09/13/2017     Priority: Medium       Current Outpatient Medications   Medication    albuterol (PROAIR HFA/PROVENTIL HFA/VENTOLIN HFA) 108 (90 Base) MCG/ACT inhaler    glycerin (LAXATIVE) 1.2 g suppository    lisinopril  (ZESTRIL) 10 MG tablet    venlafaxine (EFFEXOR XR) 75 MG 24 hr capsule    acetaminophen (TYLENOL) 500 MG tablet    IBUPROFEN PO     No current facility-administered medications for this visit.           Objective    BP (!) 146/83   Pulse 78   Temp 98  F (36.7  C) (Tympanic)   Resp 20   SpO2 98%   Physical Exam       GEN: non-diaphoretic, no distress  Abd: no guarding  CV: HDS    No results found for any visits on 11/06/23.                  The use of Dragon/[employer] dictation services may have been used to construct the content in this note; any grammatical or spelling errors are non-intentional. Please contact the author of this note directly if you are in need of any clarification.

## 2023-11-06 NOTE — PATIENT INSTRUCTIONS
MORNING OF MIRALAX CLEANOUT    When you wake up:  Begin a liquid diet. (See list below for suggestions.)  Take 2 Dulcolax (bisacodyl) tablets. (DO NOT CHEW.)    1 hour after waking up:  Mix the entire 238-gram bottle of MiraLAX with 64 ounces of a sports drink (avoid red colored ones)  Drink all of the mixture over the next few hours until gone. (Suggestion: An 8-ounce glass every 15-30 minutes equals 2-4 hours.)  It is very important to drink plenty of water and other liquids in order to avoid dehydration and to flush the bowel. (Although alcohol is a liquid, it can make you dehydrated. You should NOT drink alcohol while doing the cleanout.)    NOTE: Please stay home once you have started your cleanout. Also, the use of moist towelettes or wipes may help to minimize discomfort during the cleanout. A nonprescription 1% hydrocortisone cream may also be soothing when applied to the rectal area after each bowel movement.  It is common during the cleanout to experience some nausea, bloating, and/or abdominal distention. If you chilled the mixture prior to drinking it, you could experience chills from consuming so much cold liquid in a short time period. If you develop nausea or vomiting, slow down the rate at which you drink the solution. Please attempt to drink all of the laxative solution even if it takes you longer.Once stooling slows down, you may resume eating solid food.    LIQUID DIET  Juices  Coffee and Tea  Powdered Drinks  Water/Vitamin Water  Diet/Regular Sodas  Sports Drinks  Popsicles  Jell-O  Broths or Bouillon  Ensure or Boost    High-potassium content foods  Highest content (>25 mEq/100 g)   Dried figs   Molasses   Seaweed   Very high content (>12.5 mEq/100 g)   Dried fruits (dates, prunes)   Nuts   Avocados   Bran cereals   Wheat germ   Lima beans    High content (>6.2 mEq/100 g)   Vegetables   Spinach   Tomatoes   Broccoli   Winter squash   Beets   Carrots   Cauliflower   Potatoes   Fruits   Bananas    Cantaloupe   Kiwis   Oranges   Mangos   Meats   Ground beef   Steak   Pork   Veal   Rivers

## 2023-11-09 ENCOUNTER — MYC MEDICAL ADVICE (OUTPATIENT)
Dept: PEDIATRICS | Facility: CLINIC | Age: 43
End: 2023-11-09
Payer: COMMERCIAL

## 2023-11-10 ENCOUNTER — VIRTUAL VISIT (OUTPATIENT)
Dept: PEDIATRICS | Facility: CLINIC | Age: 43
End: 2023-11-10
Payer: COMMERCIAL

## 2023-11-10 DIAGNOSIS — Z13.9 ENCOUNTER FOR SCREENING INVOLVING SOCIAL DETERMINANTS OF HEALTH (SDOH): Primary | ICD-10-CM

## 2023-11-10 PROCEDURE — 99214 OFFICE O/P EST MOD 30 MIN: CPT | Mod: 95 | Performed by: INTERNAL MEDICINE

## 2023-11-10 PROCEDURE — T1013 SIGN LANG/ORAL INTERPRETER: HCPCS | Mod: U3

## 2023-11-10 NOTE — COMMUNITY RESOURCES LIST (ENGLISH)
11/10/2023   [facility] Empress[facility]  N/A  For questions about this resource list or additional care needs, please contact your primary care clinic or care manager.  Phone: [phone]   Email: N/A   Address: [address] [place] [id number]   Hours: N/A        Hotlines and Helplines       Hotline - Housing crisis  1  [facility] (Main Office) - Emergency Services Distance: 5.39 miles      Phone/Virtual   [address] [place] [address]  Language: English  Hours: Mon - Sun Open 24 Hours   Phone: (187) 364-1766 Email: info@[facility].org Website: http://[facility].org     2  [facility] Distance: 10.4 miles      Phone/Virtual   [address] [place] [address]  Language: English  Hours: Mon - Sun Open 24 Hours   Phone: (464) 413-8764 Email: communications@[facility].org Website: https://[facility].org/oursaviourshousing/          Housing       Coordinated Entry access point  3  [facility] Distance: 8.62 miles      In-Person, Phone/Virtual   [address] [place] [address]  Language: English, Yoruba  Hours: Mon - Fri 8:30 AM - 4:30 PM  Fees: Free   Phone: (251) 995-1981 Email: info@[facility].org Website: https://www.[facility].org/locations/[facility]-clinic/     4  [facility] - Housing Services Distance: 10.39 miles      In-Person   [address] [place] [address]  Language: English  Hours: Mon - Fri 9:00 AM - 5:00 PM  Fees: Free   Phone: (990) 470-3919 Email: housing@[facility].org Website: http://www.[facility].org/housing     Drop-in center or day shelter  5  [facility] Distance: 9.9 miles      In-Person   [address] [place] [address]  Language: English  Hours: Mon - Fri 9:00 AM - 4:00 PM  Fees: Free   Phone: (830) 775-7965 Email: gabriellak@[employer].org Website: http://listeninghouse.org     6  [facility] Distance: 10.79 miles      In-Person   [address] [place] [address]  Language: English, Cypriot, Samoan  Hours: Mon - Sat 7:00 AM - 3:00 PM  Fees: Free, Self Pay   Phone: (146) 316-3846 Email: info@[employer] Website: https://www.[employer]/locations/opportunity-center/     Housing search assistance  7  [facility] Aging and Disability Services Distance: 5.21 miles      In-Person   [address] [place] [address]  Language: English  Hours: Mon - Fri 8:00 AM - 4:00 PM  Fees: Free, Insurance, Sliding Fee   Phone: (731) 731-5870 Email: mary alice@[facility] Website: https://www.[facility]./HealthFamily/Disabilities     8  [facility] - Online Housing Search Assistance Distance: 5.78 miles      Phone/Virtual   [address] [place] [address]  Language: English  Hours: Mon - Sun Open 24 Hours  Fees: Free   Phone: (301) 518-6743 Email: findmartin@[employer] Website: https://[employer] [facility]/     Shelter for families  9  [facility] Distance: 1.11 miles      In-Person   [address] [place] [address]  Language: English  Hours: Mon - Sun Open 24 Hours  Fees: Free, Sliding Fee   Phone: (385) 102-3071 Ext.1 Email: info@[facility] Website: http://www.[facility]     10  [facility] Distance: 24.12 miles      In-Person   [address] [place] [address]  Language: English  Hours: Mon - Fri 3:00 PM - 9:00 AM , Sat - Sun Open 24 Hours  Fees: Free   Phone: (246) 492-7960 Ext.1 Website: https://www.[facility].org/2020/07/03/emergency-family-shelter/     Shelter for individuals  11  [facility]  Janiya Hector  [facility] Distance: 6.61 miles      In-Person   [address] [place] [address]  Language: English, Samoan  Hours: Mon - Fri 8:00 AM - 4:30 PM  Fees: Free   Phone: (979) 762-2684 Email: info@[employer]  Website: http://www.capagency.org     12  [facility] [place] [facility] [place] [facility] Distance: 8.63 miles      In-Person   [address] [place] [address]  Language: English  Hours: Mon - Sun 5:00 PM - 10:00 AM  Fees: Free, Self Pay   Phone: (703) 530-4522 Email: info@[employer] Website: https://www.[employer]/locations/[facility]/          Important Numbers & Websites       Emergency Services   911  [facility] Services   311  Poison Control   (397) 237-8760  Suicide Prevention Lifeline   (929) 178-8890 (TALK)  Child Abuse Hotline   (837) 735-1890 (4-A-Child)  Sexual Assault Hotline   (739) 741-6393 (HOPE)  National Runaway Safeline   (245) 794-2638 (RUNAWAY)  All-Options Talkline   (238) 947-2433  Substance Abuse Referral   (161) 249-5616 (HELP)

## 2023-11-10 NOTE — PROGRESS NOTES
.  Answers submitted by the patient for this visit:  General Questionnaire (Submitted on 11/10/2023)  Chief Complaint: Chronic problems general questions HPI Form  How many servings of fruits and vegetables do you eat daily?: 2-3  On average, how many sweetened beverages do you drink each day (Examples: soda, juice, sweet tea, etc.  Do NOT count diet or artificially sweetened beverages)?: 0  How many minutes a day do you exercise enough to make your heart beat faster?: 60 or more  How many days a week do you exercise enough to make your heart beat faster?: 7  How many days per week do you miss taking your medication?: 0  General Concern (Submitted on 11/10/2023)  Chief Complaint: Chronic problems general questions HPI Form  What is the reason for your visit today?: constipation  When did your symptoms begin?: 1-2 weeks ago  What are your symptoms?: Constipation, abdomen cramps and lower back  How would you describe these symptoms?: Severe  Are your symptoms:: Staying the same  Have you had these symptoms before?: No  Is there anything that makes you feel worse?: no  Is there anything that makes you feel better?: no

## 2023-11-10 NOTE — PROGRESS NOTES
Santo is a 43 year old who is being evaluated via a billable video visit.      How would you like to obtain your AVS? MyChart  If the video visit is dropped, the invitation should be resent by: Text to cell phone: [phone]  Will anyone else be joining your video visit? No      Has had constipation for the last 2 weeks.  Had to leave work went to ; taken xrays and eventually sent to [place].  Had CT and xrays.      Eventually given miralax and dulcolax.      Last bowel movement was this AM, but before that was 3 days ago.  Stool is hard.  No blood.      Ongoing effexor and albuterol    Assessment & Plan     Encounter for screening involving social determinants of health (SDoH)  This is not relevant.    Constipation:     Labs, CT, xray, and urinalysis reviewed.     Improving, but only taking miralax as needed.  Encouraged daily use for 1 month to keep bowel movements 2-3 times per day.     Filled out paperwork excusing from work 11/3 to 11/13.      Patient Instructions   It was good talking with you earlier today.  Here's a summary of what we discussed:    1)   High fiber diet advised, including limiting white rice, white bread, white pasta, and dairy products, and increasing intake of whole grains, vegetables, and fruits.  An adult should strive for goal of 35 grams of fiber daily, and a child should strive for (his/her age + 5) grams of fiber daily.  An apple, for instance, has about 4 grams of fiber.      2)  For now take daily miralax 1 capful for the next 1 month for now.    Goal 2-3 bowel movements per day.      3)  Should be okay to return to work on Monday 11/13.        Pernell Birmingham MD  Internal Medicine and Pediatrics                     Pernell Birmingham MD  [facility] [place] is a 43 year old, presenting for the following health issues:  Constipation (2 weeks, disability paperwork)      11/10/2023     3:39 PM   Additional Questions   Roomed by Lizet Goodwin CMA    Accompanied by          11/10/2023     3:39 PM   Patient Reported Additional Medications   Patient reports taking the following new medications N/A       History of Present Illness       Reason for visit:  Constipation  Symptom onset:  1-2 weeks ago  Symptoms include:  Constipation, abdomen cramps and lower back  Symptom intensity:  Severe  Symptom progression:  Staying the same  Had these symptoms before:  No  What makes it worse:  No  What makes it better:  No    He eats 2-3 servings of fruits and vegetables daily.He consumes 0 sweetened beverage(s) daily.He exercises with enough effort to increase his heart rate 60 or more minutes per day.  He exercises with enough effort to increase his heart rate 7 days per week.   He is taking medications regularly.               Review of Systems   CONSTITUTIONAL: NEGATIVE for fever, chills, change in weight  INTEGUMENTARY/SKIN: NEGATIVE for worrisome rashes, moles or lesions  EYES: NEGATIVE for vision changes or irritation  ENT/MOUTH: NEGATIVE for ear, mouth and throat problems  RESP: NEGATIVE for significant cough or SOB  BREAST: NEGATIVE for masses, tenderness or discharge  CV: NEGATIVE for chest pain, palpitations or peripheral edema  GI: NEGATIVE for nausea, abdominal pain, heartburn, or change in bowel habits  : NEGATIVE for frequency, dysuria, or hematuria  MUSCULOSKELETAL: NEGATIVE for significant arthralgias or myalgia  NEURO: NEGATIVE for weakness, dizziness or paresthesias  ENDOCRINE: NEGATIVE for temperature intolerance, skin/hair changes  HEME: NEGATIVE for bleeding problems  PSYCHIATRIC: NEGATIVE for changes in mood or affect      Objective    Vitals - Patient Reported  Weight (Patient Reported): 86.2 kg (190 lb)  Pain Score: Severe Pain (6)  Pain Loc: Abdomen        Physical Exam   GENERAL: Healthy, alert and no distress  EYES: Eyes grossly normal to inspection.  No discharge or erythema, or obvious scleral/conjunctival abnormalities.  RESP: No  audible wheeze, cough, or visible cyanosis.  No visible retractions or increased work of breathing.    SKIN: Visible skin clear. No significant rash, abnormal pigmentation or lesions.  NEURO: Cranial nerves grossly intact.  Mentation and speech appropriate for age.  PSYCH: Mentation appears normal, affect normal/bright, judgement and insight intact, normal speech and appearance well-groomed.                Video-Visit Details    Type of service:  Video Visit     Originating Location (pt. Location): Home    Distant Location (provider location):  On-site  Platform used for Video Visit: SejalWell

## 2023-11-10 NOTE — PATIENT INSTRUCTIONS
It was good talking with you earlier today.  Here's a summary of what we discussed:    1)   High fiber diet advised, including limiting white rice, white bread, white pasta, and dairy products, and increasing intake of whole grains, vegetables, and fruits.  An adult should strive for goal of 35 grams of fiber daily, and a child should strive for (his/her age + 5) grams of fiber daily.  An apple, for instance, has about 4 grams of fiber.      2)  For now take daily miralax 1 capful for the next 1 month for now.    Goal 2-3 bowel movements per day.      3)  Should be okay to return to work on Monday 11/13.        Pernell Birmingham MD  Internal Medicine and Pediatrics

## 2023-11-15 ENCOUNTER — TRANSFERRED RECORDS (OUTPATIENT)
Dept: HEALTH INFORMATION MANAGEMENT | Facility: CLINIC | Age: 43
End: 2023-11-15
Payer: COMMERCIAL

## 2023-11-15 ENCOUNTER — HOSPITAL ENCOUNTER (EMERGENCY)
Facility: CLINIC | Age: 43
Discharge: HOME | End: 2023-11-16
Attending: EMERGENCY MEDICINE | Admitting: EMERGENCY MEDICINE
Payer: COMMERCIAL

## 2023-11-15 ENCOUNTER — MYC MEDICAL ADVICE (OUTPATIENT)
Dept: PEDIATRICS | Facility: CLINIC | Age: 43
End: 2023-11-15
Payer: COMMERCIAL

## 2023-11-15 VITALS
DIASTOLIC BLOOD PRESSURE: 95 MMHG | TEMPERATURE: 97.4 F | HEART RATE: 62 BPM | RESPIRATION RATE: 18 BRPM | OXYGEN SATURATION: 100 % | SYSTOLIC BLOOD PRESSURE: 138 MMHG

## 2023-11-15 DIAGNOSIS — K59.00 CONSTIPATION, UNSPECIFIED CONSTIPATION TYPE: ICD-10-CM

## 2023-11-15 PROCEDURE — 96360 HYDRATION IV INFUSION INIT: CPT | Mod: 59

## 2023-11-15 PROCEDURE — 258N000003 HC RX IP 258 OP 636: Performed by: EMERGENCY MEDICINE

## 2023-11-15 PROCEDURE — 83690 ASSAY OF LIPASE: CPT | Performed by: EMERGENCY MEDICINE

## 2023-11-15 PROCEDURE — 99285 EMERGENCY DEPT VISIT HI MDM: CPT | Mod: 25

## 2023-11-15 PROCEDURE — 85007 BL SMEAR W/DIFF WBC COUNT: CPT | Performed by: EMERGENCY MEDICINE

## 2023-11-15 PROCEDURE — 36415 COLL VENOUS BLD VENIPUNCTURE: CPT | Performed by: EMERGENCY MEDICINE

## 2023-11-15 PROCEDURE — 80053 COMPREHEN METABOLIC PANEL: CPT | Performed by: EMERGENCY MEDICINE

## 2023-11-15 PROCEDURE — 85041 AUTOMATED RBC COUNT: CPT | Performed by: EMERGENCY MEDICINE

## 2023-11-15 PROCEDURE — 85027 COMPLETE CBC AUTOMATED: CPT | Performed by: EMERGENCY MEDICINE

## 2023-11-15 RX ADMIN — SODIUM CHLORIDE 1000 ML: 9 INJECTION, SOLUTION INTRAVENOUS at 23:40

## 2023-11-15 ASSESSMENT — ACTIVITIES OF DAILY LIVING (ADL): ADLS_ACUITY_SCORE: 33

## 2023-11-15 NOTE — LETTER
November 20, 2023      Santo Toure  [address]  [place] [address]        To Whom It May Concern:    Santo Toure was seen in our clinic. He now may return to work without restrictions on 11/28/2023, providing symptoms improve.      Sincerely,        Pernell Birmingham MD

## 2023-11-15 NOTE — Clinical Note
Santo Toure was seen and treated in our emergency department on 11/15/2023.  He may return to work on 11/19/2023.  Please excuse patient from work 11/14/23 through 11/19/2023. Patient was seen in our emergency department     If you have any questions or concerns, please don't hesitate to call.      Wilder Duval MD

## 2023-11-16 ENCOUNTER — APPOINTMENT (OUTPATIENT)
Dept: CT IMAGING | Facility: CLINIC | Age: 43
End: 2023-11-16
Attending: EMERGENCY MEDICINE
Payer: COMMERCIAL

## 2023-11-16 LAB
ALBUMIN SERPL BCG-MCNC: 4.2 G/DL (ref 3.5–5.2)
ALP SERPL-CCNC: 67 U/L (ref 40–150)
ALT SERPL W P-5'-P-CCNC: 17 U/L (ref 0–70)
ANION GAP SERPL CALCULATED.3IONS-SCNC: 11 MMOL/L (ref 7–15)
AST SERPL W P-5'-P-CCNC: 19 U/L (ref 0–45)
BASOPHILS # BLD AUTO: NORMAL 10*3/UL
BASOPHILS # BLD MANUAL: 0 10E3/UL (ref 0–0.2)
BASOPHILS NFR BLD AUTO: NORMAL %
BASOPHILS NFR BLD MANUAL: 0 %
BILIRUB SERPL-MCNC: 0.2 MG/DL
BUN SERPL-MCNC: 17.1 MG/DL (ref 6–20)
CALCIUM SERPL-MCNC: 9.2 MG/DL (ref 8.6–10)
CHLORIDE SERPL-SCNC: 99 MMOL/L (ref 98–107)
CREAT SERPL-MCNC: 0.76 MG/DL (ref 0.67–1.17)
DEPRECATED HCO3 PLAS-SCNC: 29 MMOL/L (ref 22–29)
EGFRCR SERPLBLD CKD-EPI 2021: >90 ML/MIN/1.73M2
EOSINOPHIL # BLD AUTO: NORMAL 10*3/UL
EOSINOPHIL # BLD MANUAL: 0.3 10E3/UL (ref 0–0.7)
EOSINOPHIL NFR BLD AUTO: NORMAL %
EOSINOPHIL NFR BLD MANUAL: 3 %
ERYTHROCYTE [DISTWIDTH] IN BLOOD BY AUTOMATED COUNT: 13.2 % (ref 10–15)
GLUCOSE SERPL-MCNC: 94 MG/DL (ref 70–99)
HCT VFR BLD AUTO: 44.6 % (ref 40–53)
HGB BLD-MCNC: 14.5 G/DL (ref 13.3–17.7)
IMM GRANULOCYTES # BLD: NORMAL 10*3/UL
IMM GRANULOCYTES NFR BLD: NORMAL %
LIPASE SERPL-CCNC: 38 U/L (ref 13–60)
LYMPHOCYTES # BLD AUTO: NORMAL 10*3/UL
LYMPHOCYTES # BLD MANUAL: 2.4 10E3/UL (ref 0.8–5.3)
LYMPHOCYTES NFR BLD AUTO: NORMAL %
LYMPHOCYTES NFR BLD MANUAL: 26 %
MCH RBC QN AUTO: 28.3 PG (ref 26.5–33)
MCHC RBC AUTO-ENTMCNC: 32.5 G/DL (ref 31.5–36.5)
MCV RBC AUTO: 87 FL (ref 78–100)
MONOCYTES # BLD AUTO: NORMAL 10*3/UL
MONOCYTES # BLD MANUAL: 0.4 10E3/UL (ref 0–1.3)
MONOCYTES NFR BLD AUTO: NORMAL %
MONOCYTES NFR BLD MANUAL: 4 %
NEUTROPHILS # BLD AUTO: NORMAL 10*3/UL
NEUTROPHILS # BLD MANUAL: 6.2 10E3/UL (ref 1.6–8.3)
NEUTROPHILS NFR BLD AUTO: NORMAL %
NEUTROPHILS NFR BLD MANUAL: 67 %
NRBC # BLD AUTO: 0 10E3/UL
NRBC BLD AUTO-RTO: 0 /100
PLAT MORPH BLD: NORMAL
PLATELET # BLD AUTO: 159 10E3/UL (ref 150–450)
POTASSIUM SERPL-SCNC: 3.4 MMOL/L (ref 3.4–5.3)
PROT SERPL-MCNC: 6.6 G/DL (ref 6.4–8.3)
RBC # BLD AUTO: 5.12 10E6/UL (ref 4.4–5.9)
RBC MORPH BLD: NORMAL
SODIUM SERPL-SCNC: 139 MMOL/L (ref 135–145)
WBC # BLD AUTO: 9.3 10E3/UL (ref 4–11)

## 2023-11-16 PROCEDURE — 74177 CT ABD & PELVIS W/CONTRAST: CPT

## 2023-11-16 PROCEDURE — 250N000011 HC RX IP 250 OP 636: Performed by: EMERGENCY MEDICINE

## 2023-11-16 RX ORDER — ONDANSETRON 4 MG/1
4 TABLET, ORALLY DISINTEGRATING ORAL EVERY 8 HOURS PRN
Qty: 10 TABLET | Refills: 0 | Status: SHIPPED | OUTPATIENT
Start: 2023-11-16 | End: 2023-11-19

## 2023-11-16 RX ORDER — IOPAMIDOL 755 MG/ML
500 INJECTION, SOLUTION INTRAVASCULAR ONCE
Status: COMPLETED | OUTPATIENT
Start: 2023-11-16 | End: 2023-11-16

## 2023-11-16 RX ADMIN — IOPAMIDOL 95 ML: 755 INJECTION, SOLUTION INTRAVENOUS at 00:17

## 2023-11-16 ASSESSMENT — ACTIVITIES OF DAILY LIVING (ADL): ADLS_ACUITY_SCORE: 35

## 2023-11-16 NOTE — ED TRIAGE NOTES
Pt arrives with complaint of constipation for 2-3 weeks, abdominal pain, and back pain. Has tried fleet suppositories, stool softeners, and laxatives with little to no improvement. Did have 2 bowel movements today but no improvement in pain, still feels very constipated. A&Ox4. Speaks ASL.

## 2023-11-16 NOTE — ED NOTES
Patient also wanted to add that he was seen at a [facility] in [facility] where he was not offered  services and only given a 2 day work note with no relief in symptoms; requesting to cancel a colonoscopy that was scheduled during this visit, instead wants a new colonoscopy scheduled during this visit with a different doctor.

## 2023-11-16 NOTE — TELEPHONE ENCOUNTER
Sly [place] from patient requesting an extension of his STD.  Below is an outline of the patients request:    - asking for extension of STD due to illness and boss sent him home from work  - feeling weak and waiting for colonoscopy on 11/27  - states he's been absent since 11/14 and wont return until 11/20  - asking to include he'll be having a colonoscopy and that he was in the ER on 11/15/23    Paperwork in Dr. Birmingham's basket for review.

## 2023-11-16 NOTE — ED PROVIDER NOTES
History     Chief Complaint:  Constipation       The history is provided by the patient. A  was used (ASL).      Santo Toure is a 43 year old male with history of kidney stones who presents to the ED for evaluation of constipation. He reports having constant constipation for the past three weeks and it's worse on his right abdomen. He went to Urgent Care a couple times last week and was scheduled a colonoscopy on 11/27 but he expresses having a bad experiencing with his doctor and wanting to change into the [place] [facility] system for his colonoscopy. He states being in worse pain tonight and wants pain medications. He takes Miralax twice a day for the past week along with Fleet with no relief. His last big bowel movement was a week ago and the size was a marble. He had little bowel movements today but no improvements. He mentions losing 52 pounds since July due to eating 5 portions a day and working out daily. No history of colonoscopy before or other surgeries.     Independent Historian:   None - Patient Only    Review of External Notes:   Chart review and prior CT reports    Medications:    albuterol (PROAIR HFA/PROVENTIL HFA/VENTOLIN HFA) 108 (90 Base) MCG/ACT inhaler  glycerin (LAXATIVE) 1.2 g suppository  lisinopril (ZESTRIL) 10 MG tablet  venlafaxine (EFFEXOR XR) 75 MG 24 hr capsule    Past Medical History:    Asthma  Deaf  Kidney stone  GEOVANNY  Essential hypertension  Gout    Past Surgical History:    Past Surgical History:   Procedure Laterality Date    CYSTOSCOPY, RETROGRADES, INSERT STENT URETER(S), COMBINED Right 9/14/2017    Procedure: COMBINED CYSTOSCOPY, RETROGRADES, INSERT STENT URETER(S);  Cysto , right stent placement;  Surgeon: Pavel Lundy MD;  Location: [facility]    LASER HOLMIUM LITHOTRIPSY URETER(S), INSERT STENT, COMBINED Right 10/5/2017    Procedure: COMBINED CYSTOSCOPY, URETEROSCOPY, LASER HOLMIUM LITHOTRIPSY URETER(S), INSERT STENT;  Cystoscopy, right  ureteroscopy, laser lithotripsy, stent exchange;  Surgeon: Pavel Lundy MD;  Location: [facility]      Physical Exam   Patient Vitals for the past 24 hrs:   BP Temp Temp src Pulse Resp SpO2   11/15/23 2155 (!) 138/95 97.4  F (36.3  C) Temporal 62 18 100 %      Physical Exam    GENERAL: well developed, pleasant  HEAD: atraumatic  EYES: pupils reactive, extraocular muscles intact, conjunctivae normal  ENT:  mucus membranes moist  NECK:  trachea midline, normal range of motion  RESPIRATORY: no tachypnea, breath sounds clear to auscultation   CVS: normal S1/S2, no murmurs, intact distal pulses  ABDOMEN: soft, nontender, nondistention  MUSCULOSKELETAL: no deformities  SKIN: warm and dry, no acute rashes or ulceration  NEURO: GCS 15, cranial nerves intact, alert and oriented x3  PSYCH:  Mood/affect normal    Emergency Department Course     Imaging:  CT Abdomen Pelvis w Contrast   Final Result   IMPRESSION:    1.  Large amount of stool throughout the colon compatible with history constipation. No acute abnormality.         Report per radiology    Laboratory:  Labs Ordered and Resulted from Time of ED Arrival to Time of ED Departure   COMPREHENSIVE METABOLIC PANEL - Normal       Result Value    Sodium 139      Potassium 3.4      Carbon Dioxide (CO2) 29      Anion Gap 11      Urea Nitrogen 17.1      Creatinine 0.76      GFR Estimate >90      Calcium 9.2      Chloride 99      Glucose 94      Alkaline Phosphatase 67      AST 19      ALT 17      Protein Total 6.6      Albumin 4.2      Bilirubin Total 0.2     LIPASE - Normal    Lipase 38     CBC WITH PLATELETS AND DIFFERENTIAL    WBC Count 9.3      RBC Count 5.12      Hemoglobin 14.5      Hematocrit 44.6      MCV 87      MCH 28.3      MCHC 32.5      RDW 13.2      Platelet Count 159      % Neutrophils        % Lymphocytes        % Monocytes        % Eosinophils        % Basophils        % Immature Granulocytes        NRBCs per 100 WBC 0      Absolute Neutrophils         Absolute Lymphocytes        Absolute Monocytes        Absolute Eosinophils        Absolute Basophils        Absolute Immature Granulocytes        Absolute NRBCs 0.0     DIFFERENTIAL    % Neutrophils 67      % Lymphocytes 26      % Monocytes 4      % Eosinophils 3      % Basophils 0      Absolute Neutrophils 6.2      Absolute Lymphocytes 2.4      Absolute Monocytes 0.4      Absolute Eosinophils 0.3      Absolute Basophils 0.0      RBC Morphology Confirmed RBC Indices      Platelet Assessment        Value: Automated Count Confirmed. Platelet morphology is normal.      Procedures   None    Emergency Department Course & Assessments:       Interventions:  Medications   sodium chloride 0.9% BOLUS 1,000 mL (0 mLs Intravenous Stopped 11/16/23 0103)   sodium chloride (PF) 0.9% PF flush 100 mL (64 mLs Intravenous $Given 11/16/23 0017)   iopamidol (ISOVUE-370) solution 500 mL (95 mLs Intravenous $Given 11/16/23 0017)      Independent Interpretation (X-rays, CTs, rhythm strip):  None    Assessments/Consultations/Discussion of Management or Tests:    ED Course as of 11/16/23 0145   Wed Nov 15, 2023   2319 I obtained history and examined the patient as noted above.    u Nov 16, 2023   0050 I rechecked the patient and explained findings.    0123 I rechecked the patient and explained findings.    0128 I prepared the patient to be discharged home.      Social Determinants of Health affecting care:   None    Disposition:  The patient was discharged to home.     Impression & Plan      Medical Decision Making:    Patient presents with ongoing concerns with constipation for the last several weeks and is frustrated with lack of results.  He is taking twice a day MiraLAX and stool softeners.  He has tried a fleets without any results.  He has an upcoming colonoscopy but wants to have the scope here at the hospital.   was used.  He notes he is not able to go to work due to the pain.  He notes he has lost 50  pounds but was doing so with diet and was purposely trying to lose weight.  CT shows extensive constipation but mainly in the ascending and transverse colon some stool in the descending colon but not significant.  He was given a soapsuds enema and was able to hold it for some time with little output.  Reviewing the CT looking he is going to require more work from above in terms of oral as opposed to enema.  Discussed this with him.  He feels more comfortable going home with a different treatment plan than before.  We will do a GoLytely prep as he is already doing twice a day MiraLAX and fluids.  Did send a note requesting  to Dr. Loera's office.  Work note was also provided for Tuesday through Sunday.    Diagnosis:    ICD-10-CM    1. Constipation, unspecified constipation type  K59.00            Discharge Medications:  New Prescriptions    ONDANSETRON (ZOFRAN ODT) 4 MG ODT TAB    Take 1 tablet (4 mg) by mouth every 8 hours as needed for nausea    POLYETHYLENE GLYCOL (GOLYTELY) 236 G SUSPENSION    Take 4,000 mLs by mouth once for 1 dose      Scribe Disclosure:  I, Brenda Arreola, am serving as a scribe at 11:14 PM on 11/15/2023 to document services personally performed by Wilder Duval MD based on my observations and the provider's statements to me.   11/15/2023   Wilder Duval MD Adams, Shaun L, MD  11/16/23 8461

## 2023-11-16 NOTE — TELEPHONE ENCOUNTER
Faxed forms to [phone] and emailed copy to patient.  Forms abstracted and placed in Dr. Singh file at station C

## 2023-11-16 NOTE — ED NOTES
Soap suds enema instilled. Pt tolerated well. No resistance met, no BM on end of tube. Reported to Dr. Duval.    Pt encouraged to hold for 30 mins. Commode set up in room

## 2023-11-21 NOTE — TELEPHONE ENCOUNTER
Faxed to [phone] and [facility] scanning.  Abstracted to [employer] file at station C.

## 2023-11-27 ENCOUNTER — TRANSFERRED RECORDS (OUTPATIENT)
Dept: HEALTH INFORMATION MANAGEMENT | Facility: CLINIC | Age: 43
End: 2023-11-27

## 2023-12-04 ENCOUNTER — MYC MEDICAL ADVICE (OUTPATIENT)
Dept: PEDIATRICS | Facility: CLINIC | Age: 43
End: 2023-12-04
Payer: COMMERCIAL

## 2023-12-04 DIAGNOSIS — R10.84 ABDOMINAL PAIN, GENERALIZED: Primary | ICD-10-CM

## 2023-12-04 NOTE — LETTER
December 5, 2023      Santo Toure  [address]  [place] [address]        To Whom It May Concern:    Santo Toure  was seen on 12/5/23.  Please excuse him 12/4, 12/5, and 12/6/2023 due to illness.        Sincerely,        Pernell Birmingham MD

## 2023-12-07 PROBLEM — R10.84 ABDOMINAL PAIN, GENERALIZED: Status: ACTIVE | Noted: 2023-12-07

## 2023-12-10 ENCOUNTER — MYC REFILL (OUTPATIENT)
Dept: FAMILY MEDICINE | Facility: CLINIC | Age: 43
End: 2023-12-10
Payer: COMMERCIAL

## 2023-12-10 DIAGNOSIS — J06.9 VIRAL URI WITH COUGH: ICD-10-CM

## 2023-12-11 RX ORDER — ALBUTEROL SULFATE 90 UG/1
AEROSOL, METERED RESPIRATORY (INHALATION)
Qty: 18 G | Refills: 0 | Status: SHIPPED | OUTPATIENT
Start: 2023-12-11 | End: 2024-04-12

## 2023-12-19 ENCOUNTER — DOCUMENTATION ONLY (OUTPATIENT)
Dept: GASTROENTEROLOGY | Facility: CLINIC | Age: 43
End: 2023-12-19
Payer: COMMERCIAL

## 2023-12-19 NOTE — PROGRESS NOTES
Called Patient to relay need for HENRRY form to obtain records from [facility] to proceed with recent referral.    Left VM with ASL .      SK

## 2024-01-03 NOTE — PROGRESS NOTES
Received signed HENRRY from Patient via e-mail.    Faxed records request.    Records Requested:  Please send records from the past 10 years:  GI clinic notes, lab results, pathology reports, imaging reports, procedure reports    Facility Information:  [facility]  Address: [address], [place] [address]  Phone #: [phone]  Fax #: [phone]      SK

## 2024-01-10 ENCOUNTER — MYC MEDICAL ADVICE (OUTPATIENT)
Dept: PEDIATRICS | Facility: CLINIC | Age: 44
End: 2024-01-10

## 2024-01-10 ENCOUNTER — ANCILLARY PROCEDURE (OUTPATIENT)
Dept: GENERAL RADIOLOGY | Facility: CLINIC | Age: 44
End: 2024-01-10
Attending: PHYSICIAN ASSISTANT
Payer: COMMERCIAL

## 2024-01-10 ENCOUNTER — OFFICE VISIT (OUTPATIENT)
Dept: URGENT CARE | Facility: URGENT CARE | Age: 44
End: 2024-01-10
Payer: COMMERCIAL

## 2024-01-10 VITALS
SYSTOLIC BLOOD PRESSURE: 125 MMHG | DIASTOLIC BLOOD PRESSURE: 80 MMHG | OXYGEN SATURATION: 96 % | TEMPERATURE: 98 F | HEART RATE: 84 BPM

## 2024-01-10 DIAGNOSIS — R05.1 ACUTE COUGH: ICD-10-CM

## 2024-01-10 DIAGNOSIS — J10.1 INFLUENZA A: Primary | ICD-10-CM

## 2024-01-10 LAB
DEPRECATED S PYO AG THROAT QL EIA: NEGATIVE
FLUAV AG SPEC QL IA: POSITIVE
FLUBV AG SPEC QL IA: NEGATIVE
GROUP A STREP BY PCR: NOT DETECTED

## 2024-01-10 PROCEDURE — 87635 SARS-COV-2 COVID-19 AMP PRB: CPT | Performed by: PHYSICIAN ASSISTANT

## 2024-01-10 PROCEDURE — 87651 STREP A DNA AMP PROBE: CPT | Performed by: PHYSICIAN ASSISTANT

## 2024-01-10 PROCEDURE — 71046 X-RAY EXAM CHEST 2 VIEWS: CPT | Mod: TC | Performed by: RADIOLOGY

## 2024-01-10 PROCEDURE — 87804 INFLUENZA ASSAY W/OPTIC: CPT | Performed by: PHYSICIAN ASSISTANT

## 2024-01-10 PROCEDURE — 99214 OFFICE O/P EST MOD 30 MIN: CPT | Performed by: PHYSICIAN ASSISTANT

## 2024-01-10 RX ORDER — LINACLOTIDE 290 UG/1
CAPSULE, GELATIN COATED ORAL
COMMUNITY
Start: 2023-11-15 | End: 2024-04-15

## 2024-01-10 RX ORDER — BENZONATATE 200 MG/1
200 CAPSULE ORAL 3 TIMES DAILY PRN
Qty: 15 CAPSULE | Refills: 0 | Status: SHIPPED | OUTPATIENT
Start: 2024-01-10 | End: 2024-04-15

## 2024-01-10 RX ORDER — DICYCLOMINE HYDROCHLORIDE 10 MG/1
CAPSULE ORAL
COMMUNITY
Start: 2023-11-15 | End: 2024-04-15

## 2024-01-10 NOTE — PROGRESS NOTES
Assessment & Plan     1. Influenza A  On exam, lungs are CTAB without sign of respiratory distress. Throat without PTA or RPA and TM clear B/L.   CXR was performed as he is complaining of having chest wall pain.  X-rays negative per my read.  Influenza testing is positive.  He is out of the treatment window for Tamiflu.  Will give him Tessalon to start taking to aid in cough.  Encouraged supportive cares.  Discussed indications for follow-up.  - Streptococcus A Rapid Screen w/Reflex to PCR - Clinic Collect  - Influenza A & B Antigen - Clinic Collect  - Symptomatic COVID-19 Virus (Coronavirus) by PCR Nose  - XR Chest 2 Views; Future  - Group A Streptococcus PCR Throat Swab  - benzonatate (TESSALON) 200 MG capsule; Take 1 capsule (200 mg) by mouth 3 times daily as needed for cough  Dispense: 15 capsule; Refill: 0        Return in about 5 days (around 1/15/2024), or if symptoms worsen or fail to improve.    Diagnosis and treatment plan was reviewed with patient and/or family.   We went over any labs or imaging. Discussed worsening symptoms or little to no relief despite treatment plan to follow-up with PCP or return to clinic.  Patient verbalizes understanding. All questions were addressed and answered.     Jaclyn Cabrera PA-C  [facility] [place]    CHIEF COMPLAINT:   Chief Complaint   Patient presents with    Urgent Care     Sore throat, chest hurts, coughing, can't sleep since Sunday. No fever     Subjective     Santo is a 43 year old male who presents to clinic today for evaluation of sore throat, cough. Symptoms started 3 to 4 days ago.  Complaining of having sore throat, chest discomfort and a dry cough.  Cough is keeping him up at night.  He has been taking Mucinex and DayQuil for symptoms.  No fever noted.  No ill contacts at home.      Past Medical History:   Diagnosis Date    Asthma     seasonal, spring and fall    Deaf     Kidney stone 09/14/2017    had R side stent placed and then on  10/5/2017 had lithitripsy    PONV (postoperative nausea and vomiting)      Past Surgical History:   Procedure Laterality Date    CYSTOSCOPY, RETROGRADES, INSERT STENT URETER(S), COMBINED Right 9/14/2017    Procedure: COMBINED CYSTOSCOPY, RETROGRADES, INSERT STENT URETER(S);  Cysto , right stent placement;  Surgeon: Pavel Lundy MD;  Location: [facility]    LASER HOLMIUM LITHOTRIPSY URETER(S), INSERT STENT, COMBINED Right 10/5/2017    Procedure: COMBINED CYSTOSCOPY, URETEROSCOPY, LASER HOLMIUM LITHOTRIPSY URETER(S), INSERT STENT;  Cystoscopy, right ureteroscopy, laser lithotripsy, stent exchange;  Surgeon: Pavel Lundy MD;  Location: [facility] OR     Social History     Tobacco Use    Smoking status: Never    Smokeless tobacco: Never   Substance Use Topics    Alcohol use: Not Currently     Current Outpatient Medications   Medication    albuterol (PROAIR HFA/PROVENTIL HFA/VENTOLIN HFA) 108 (90 Base) MCG/ACT inhaler    benzonatate (TESSALON) 200 MG capsule    dicyclomine (BENTYL) 10 MG capsule    glycerin (LAXATIVE) 1.2 g suppository    LINZESS 290 MCG capsule    lisinopril (ZESTRIL) 10 MG tablet    venlafaxine (EFFEXOR XR) 75 MG 24 hr capsule     No current facility-administered medications for this visit.     Allergies   Allergen Reactions    Cephalosporins     Erythromycin     Percocet [Oxycodone-Acetaminophen] Nausea and Vomiting    Penicillins Other (See Comments) and Rash     Vomiting as a infant       10 point ROS of systems were all negative except for pertinent positives noted in my HPI.      Exam:   /80   Pulse 84   Temp 98  F (36.7  C) (Oral)   SpO2 96%   Constitutional: healthy, alert and no distress  Head: Normocephalic, atraumatic.  Eyes: conjunctiva clear, no drainage  ENT: TMs clear and shiny juan, nasal mucosa pink and moist, throat erythematous without trismus or drooling.  Neck: neck is supple, no cervical lymphadenopathy or nuchal rigidity  Cardiovascular: RRR  Respiratory:  CTA bilaterally, no rhonchi or rales  Skin: no rashes  Neurologic: Speech clear, gait normal. Moves all extremities.    Results for orders placed or performed in visit on 01/10/24   Streptococcus A Rapid Screen w/Reflex to PCR - Clinic Collect     Status: Normal    Specimen: Throat; Swab   Result Value Ref Range    Group A Strep antigen Negative Negative   Influenza A & B Antigen - Clinic Collect     Status: Abnormal    Specimen: Nose; Swab   Result Value Ref Range    Influenza A antigen Positive (A) Negative    Influenza B antigen Negative Negative    Narrative    Test results must be correlated with clinical data. If necessary, results should be confirmed by a molecular assay or viral culture.     CXR --no acute abnormality per my read.

## 2024-01-10 NOTE — LETTER
January 10, 2024      Santo Toure  [address]  [place] [address]        To Whom It May Concern:    Santo ROCCO Toure  was seen on 1/10/24. Please excuse from work 1/08/24 - 1/12/24.         Sincerely,        Jaclyn Cabrera PA-C

## 2024-01-10 NOTE — PATIENT INSTRUCTIONS
Your influenza test is positive. This is a viral infection  I have sent over a cough medication for you use for cough  Continue with ibuprofen or tylenol for sore throat    Push fluids and rest

## 2024-01-11 LAB — SARS-COV-2 RNA RESP QL NAA+PROBE: POSITIVE

## 2024-02-16 ENCOUNTER — MYC REFILL (OUTPATIENT)
Dept: FAMILY MEDICINE | Facility: CLINIC | Age: 44
End: 2024-02-16
Payer: COMMERCIAL

## 2024-02-16 DIAGNOSIS — F41.1 GENERALIZED ANXIETY DISORDER: ICD-10-CM

## 2024-02-16 ASSESSMENT — ANXIETY QUESTIONNAIRES
3. WORRYING TOO MUCH ABOUT DIFFERENT THINGS: SEVERAL DAYS
4. TROUBLE RELAXING: SEVERAL DAYS
7. FEELING AFRAID AS IF SOMETHING AWFUL MIGHT HAPPEN: SEVERAL DAYS
6. BECOMING EASILY ANNOYED OR IRRITABLE: SEVERAL DAYS
7. FEELING AFRAID AS IF SOMETHING AWFUL MIGHT HAPPEN: SEVERAL DAYS
1. FEELING NERVOUS, ANXIOUS, OR ON EDGE: SEVERAL DAYS
GAD7 TOTAL SCORE: 7
GAD7 TOTAL SCORE: 7
2. NOT BEING ABLE TO STOP OR CONTROL WORRYING: SEVERAL DAYS
GAD7 TOTAL SCORE: 7
5. BEING SO RESTLESS THAT IT IS HARD TO SIT STILL: SEVERAL DAYS

## 2024-02-16 ASSESSMENT — PATIENT HEALTH QUESTIONNAIRE - PHQ9
SUM OF ALL RESPONSES TO PHQ QUESTIONS 1-9: 9
SUM OF ALL RESPONSES TO PHQ QUESTIONS 1-9: 9
10. IF YOU CHECKED OFF ANY PROBLEMS, HOW DIFFICULT HAVE THESE PROBLEMS MADE IT FOR YOU TO DO YOUR WORK, TAKE CARE OF THINGS AT HOME, OR GET ALONG WITH OTHER PEOPLE: SOMEWHAT DIFFICULT

## 2024-02-19 DIAGNOSIS — F41.1 GENERALIZED ANXIETY DISORDER: ICD-10-CM

## 2024-02-19 RX ORDER — VENLAFAXINE HYDROCHLORIDE 75 MG/1
CAPSULE, EXTENDED RELEASE ORAL
Qty: 90 CAPSULE | Refills: 3 | Status: SHIPPED | OUTPATIENT
Start: 2024-02-19

## 2024-02-19 RX ORDER — VENLAFAXINE HYDROCHLORIDE 75 MG/1
75 CAPSULE, EXTENDED RELEASE ORAL DAILY
Qty: 90 CAPSULE | Refills: 3 | Status: SHIPPED | OUTPATIENT
Start: 2024-02-19 | End: 2024-02-19

## 2024-02-19 NOTE — PROGRESS NOTES
Assessment & Plan     Constipation, unspecified constipation type  Lower abdominal pain  Generalized abdominal pain  Stat CT reassuring.  Suspect acute symptoms a result of constipation.  Recommend bowel cleanout and bland diet.  Work note given as the patient is quite uncomfortable and unable to work at this time.  Labs reassuring.  If no significant improvement with bowel cleanout follow-up with GI, Dr. Loera.  Patient reports understanding and agreement (instructions done in writing due to lack of ).  Follow-up with Dr. Birmingham Friday virtually in follow-up and to have short-term disability paperwork filled out (patient has not yet obtained this paperwork from his employer).  - CBC with platelets differential  - Comprehensive metabolic panel  - CRP inflammation  - Erythrocyte sedimentation rate auto  - Lipase  - UA with Microscopic reflex to Culture  - Adult GI  Referral - Consult Only    Calcium oxalate calculus - hx kidney stones 2017  No evidence of recurrence.    Overweight with body mass index (BMI) 25.0-29.9  Applauded significant weight loss efforts.  Encouraged continuation.    History of gout  Historical condition.  No evidence of recurrence.    Hypokalemia  Unclear etiology.  Could be from recent bowel fluctuations.  Recommend potassium rich foods (list given to patient today) and close follow-up with PCP.      [facility]    53 minutes spent by me on the date of the encounter doing chart review, history and exam, documentation and further activities per the note      Return in about 1 week (around 11/13/2023) for follow up with GI if not improving/worsening - make appt now & cancel if not needed.    Vandana Sousa PA-C  [facility]    Judy Blanchard is a 43 year old (seen with the assistance of ASL video ), presenting for the following health  Detail Level: Generalized Detail Level: Detailed issues:  Abdominal Pain (Bilateral lower abdomen pain X 1 week/Constipation X 2 weeks)        6/28/2023     3:40 PM   Additional Questions   Roomed by Bela DONOHUE     Abdominal/Flank Pain  Onset/Duration: X 2 weeks  Description:   Character: Sharp  Location: right lower quadrant left lower quadrant  Radiation: Back  Intensity: 7/10 sitting 9/10 during BM  Progression of Symptoms:  worsening  Accompanying Signs & Symptoms:  Fever/chills: no   Gas/Bloating: YES  Nausea: YES  Vomitting: no   Diarrhea: YES  Constipation:YES -feels bloated/distended.  Dysuria: no            Hematuria: no            Frequency: YES           Incontinence of urine: YES- X 2 in the month  History:            Last bowel movement:  11/3/23-X 3 days ago was advised glycerin suppository by urgent care and had bowel movement after he used this treatment regimen.-was hard on 11/3 but normal brown.  Has had liquid like consistency diarrhea the last 2 days.  Prior to the 11/3/2023 bowel movement it had been 1 week since he had a BM.  Trauma: no   Previous similar pain: no    Previous tests done: Abdominal x-ray and labs 11/3/2023.  Showed moderate stool burden.  Labs/urine unremarkable           Previous Abdominal surgery: YES- Kidney stone surgery 2017  Precipitating factors:   Does the pain change with:     Food: no-loss of appetite     Bowel Movement: no     Urination: no              Other factors: YES- feels like a lot of pressure on my tailbone -no trauma or falls.  Therapies tried and outcome:  IBU, no help    When food last eaten: 10 AM today, chicken    Prior to 2 weeks ago reports that he had normal soft daily bowel movements.  Unclear what has caused the sudden shift in his bowel regimen.  Of note, the patient has lost approximately 50 pounds since the summer 2023.  This was intentional as he felt that he was becoming overweight and has a strong family history of coronary artery disease.  Was trying to prevent an episode  Detail Level: Simple himself.  Has done this via diet and exercise efforts.  Is not vegan or vegetarian.    Wt Readings from Last 10 Encounters:   11/06/23 86.2 kg (190 lb)   11/03/23 86.2 kg (190 lb)   06/28/23 111.9 kg (246 lb 12.8 oz)   11/16/22 108.7 kg (239 lb 9.6 oz)   10/17/22 110.4 kg (243 lb 6.4 oz)   01/17/22 117.6 kg (259 lb 3.2 oz)   12/13/21 108.9 kg (240 lb)   10/25/21 108.9 kg (240 lb)   11/26/20 104.3 kg (230 lb)   03/10/20 111.1 kg (245 lb)         Review of Systems   Constitutional, HEENT, cardiovascular, pulmonary, GI, , musculoskeletal, neuro, skin, endocrine and psych systems are negative, except as otherwise noted.      Objective    /80 (BP Location: Left arm, Patient Position: Chair, Cuff Size: Adult Large)   Pulse 87   Temp 98  F (36.7  C) (Oral)   Resp 18   Wt 86.2 kg (190 lb)   SpO2 99%   BMI 27.07 kg/m    Body mass index is 27.07 kg/m .  Physical Exam   GENERAL: healthy, alert and no distress  EYES: Eyes grossly normal to inspection,  RESP: lungs clear to auscultation - no rales, rhonchi or wheezes  CV: regular rate and rhythm, normal S1 S2, no S3 or S4, no murmur, click or rub,   ABDOMEN: soft, diffuse abdominal tenderness most notable in the right lower and suprapubic regions, no hepatosplenomegaly, no masses and bowel sounds  -hyperactive  MS: no gross musculoskeletal defects noted, no edema  SKIN: no suspicious lesions or rashes  NEURO: Normal strength and tone, mentation intact and speech normal  PSYCH: mentation appears normal, affect normal/bright    Results for orders placed or performed during the hospital encounter of 11/06/23   CT Abdomen Pelvis w Contrast     Status: None    Narrative    EXAM: CT ABDOMEN PELVIS W CONTRAST  LOCATION: [facility]  DATE: 11/6/2023    INDICATION: bilateral lower abdominal pain, hx kidney stones s p surgery 2017.  bloating  and  constipation x 2 weeks not responsive to conservative treatment  COMPARISON: 03/10/2020  TECHNIQUE: CT scan  of the abdomen and pelvis was performed following injection of IV contrast. Multiplanar reformats were obtained. Dose reduction techniques were used.  CONTRAST: 96mL Isovue 370    FINDINGS: Mild motion artifact.    LOWER CHEST: Calcified granuloma. Trace dependent atelectasis.    HEPATOBILIARY: Nondistended gallbladder. A subcentimeter hypodensity in the left lobe which is too small to characterize.    PANCREAS: Normal.    SPLEEN: Calcified granuloma.    ADRENAL GLANDS: Normal.    KIDNEYS/BLADDER: Simple renal cysts, no follow-up required. Subcentimeter hypodensities which are too small to characterize. Cortical thinning and scarring right kidney. No hydronephrosis. The bladder is poorly distended. No urolithiasis.    BOWEL: No obstruction or inflammatory change. Normal appendix.    LYMPH NODES: Normal.    VASCULATURE: Unremarkable.    PELVIC ORGANS: Normal.    MUSCULOSKELETAL: Normal.      Impression    IMPRESSION:   1.  No acute abnormality.   Results for orders placed or performed in visit on 11/06/23   Comprehensive metabolic panel     Status: Abnormal   Result Value Ref Range    Sodium 138 135 - 145 mmol/L    Potassium 3.3 (L) 3.4 - 5.3 mmol/L    Carbon Dioxide (CO2) 27 22 - 29 mmol/L    Anion Gap 9 7 - 15 mmol/L    Urea Nitrogen 12.2 6.0 - 20.0 mg/dL    Creatinine 0.75 0.67 - 1.17 mg/dL    GFR Estimate >90 >60 mL/min/1.73m2    Calcium 9.0 8.6 - 10.0 mg/dL    Chloride 102 98 - 107 mmol/L    Glucose 95 70 - 99 mg/dL    Alkaline Phosphatase 68 40 - 129 U/L    AST 18 0 - 45 U/L    ALT 21 0 - 70 U/L    Protein Total 6.7 6.4 - 8.3 g/dL    Albumin 4.3 3.5 - 5.2 g/dL    Bilirubin Total 0.2 <=1.2 mg/dL   CRP inflammation     Status: Normal   Result Value Ref Range    CRP Inflammation <3.00 <5.00 mg/L   Erythrocyte sedimentation rate auto     Status: Normal   Result Value Ref Range    Erythrocyte Sedimentation Rate 6 0 - 15 mm/hr   Lipase     Status: Normal   Result Value Ref Range    Lipase 33 13 - 60 U/L   UA with  Microscopic reflex to Culture     Status: Abnormal    Specimen: Urine, NOS   Result Value Ref Range    Color Urine Light Yellow Colorless, Straw, Light Yellow, Yellow    Appearance Urine Clear Clear    Glucose Urine Negative Negative mg/dL    Bilirubin Urine Negative Negative    Ketones Urine Negative Negative mg/dL    Specific Gravity Urine 1.020 1.003 - 1.035    Blood Urine Negative Negative    pH Urine 6.0 5.0 - 7.0    Protein Albumin Urine Negative Negative mg/dL    Urobilinogen Urine Normal Normal, 2.0 mg/dL    Nitrite Urine Negative Negative    Leukocyte Esterase Urine Negative Negative    Mucus Urine Present (A) None Seen /LPF    RBC Urine <1 <=2 /HPF    WBC Urine 1 <=5 /HPF    Squamous Epithelials Urine <1 <=1 /HPF    Narrative    Urine Culture not indicated   CBC with platelets and differential     Status: None   Result Value Ref Range    WBC Count 8.5 4.0 - 11.0 10e3/uL    RBC Count 5.38 4.40 - 5.90 10e6/uL    Hemoglobin 15.2 13.3 - 17.7 g/dL    Hematocrit 46.1 40.0 - 53.0 %    MCV 86 78 - 100 fL    MCH 28.3 26.5 - 33.0 pg    MCHC 33.0 31.5 - 36.5 g/dL    RDW 13.2 10.0 - 15.0 %    Platelet Count 163 150 - 450 10e3/uL    % Neutrophils 61 %    % Lymphocytes 28 %    % Monocytes 8 %    % Eosinophils 2 %    % Basophils 1 %    % Immature Granulocytes 0 %    NRBCs per 100 WBC 0 <1 /100    Absolute Neutrophils 5.2 1.6 - 8.3 10e3/uL    Absolute Lymphocytes 2.4 0.8 - 5.3 10e3/uL    Absolute Monocytes 0.7 0.0 - 1.3 10e3/uL    Absolute Eosinophils 0.2 0.0 - 0.7 10e3/uL    Absolute Basophils 0.1 0.0 - 0.2 10e3/uL    Absolute Immature Granulocytes 0.0 <=0.4 10e3/uL    Absolute NRBCs 0.0 10e3/uL   CBC with platelets differential     Status: None    Narrative    The following orders were created for panel order CBC with platelets differential.  Procedure                               Abnormality         Status                     ---------                               -----------         ------                     CBC  with platelets and d...[[id number]]                      Final result                 Please view results for these tests on the individual orders.       Recent Results (from the past 744 hour(s))   XR Abdomen 2 Views    Narrative    XR ABDOMEN 2 VIEWS 11/3/2023 10:46 AM    HISTORY: Abdominal pain, left lower quadrant; RLQ abdominal pain    COMPARISON: None.      Impression    IMPRESSION: No evidence of obstruction. Mild stool noted within the  descending colon and sigmoid colon. No evidence of renal stones. No  free air.    MARIO CAMACHO MD         SYSTEM ID:  [id number]

## 2024-04-09 ENCOUNTER — APPOINTMENT (OUTPATIENT)
Dept: CT IMAGING | Facility: CLINIC | Age: 44
End: 2024-04-09
Attending: EMERGENCY MEDICINE
Payer: COMMERCIAL

## 2024-04-09 ENCOUNTER — OFFICE VISIT (OUTPATIENT)
Dept: INTERPRETER SERVICES | Facility: CLINIC | Age: 44
End: 2024-04-09
Payer: COMMERCIAL

## 2024-04-09 ENCOUNTER — HOSPITAL ENCOUNTER (EMERGENCY)
Facility: CLINIC | Age: 44
Discharge: HOME | End: 2024-04-09
Attending: EMERGENCY MEDICINE | Admitting: EMERGENCY MEDICINE
Payer: COMMERCIAL

## 2024-04-09 VITALS
BODY MASS INDEX: 27.09 KG/M2 | TEMPERATURE: 97.4 F | WEIGHT: 200 LBS | DIASTOLIC BLOOD PRESSURE: 92 MMHG | RESPIRATION RATE: 20 BRPM | HEIGHT: 72 IN | SYSTOLIC BLOOD PRESSURE: 136 MMHG | HEART RATE: 64 BPM | OXYGEN SATURATION: 96 %

## 2024-04-09 DIAGNOSIS — K59.04 CHRONIC IDIOPATHIC CONSTIPATION: ICD-10-CM

## 2024-04-09 LAB
ALBUMIN UR-MCNC: NEGATIVE MG/DL
ANION GAP SERPL CALCULATED.3IONS-SCNC: 17 MMOL/L (ref 7–15)
APPEARANCE UR: CLEAR
BASOPHILS # BLD AUTO: 0.1 10E3/UL (ref 0–0.2)
BASOPHILS NFR BLD AUTO: 1 %
BILIRUB UR QL STRIP: NEGATIVE
BUN SERPL-MCNC: 13.8 MG/DL (ref 6–20)
CALCIUM SERPL-MCNC: 8.9 MG/DL (ref 8.6–10)
CHLORIDE SERPL-SCNC: 101 MMOL/L (ref 98–107)
COLOR UR AUTO: ABNORMAL
CREAT SERPL-MCNC: 0.75 MG/DL (ref 0.67–1.17)
DEPRECATED HCO3 PLAS-SCNC: 22 MMOL/L (ref 22–29)
EGFRCR SERPLBLD CKD-EPI 2021: >90 ML/MIN/1.73M2
EOSINOPHIL # BLD AUTO: 0.2 10E3/UL (ref 0–0.7)
EOSINOPHIL NFR BLD AUTO: 3 %
ERYTHROCYTE [DISTWIDTH] IN BLOOD BY AUTOMATED COUNT: 13.1 % (ref 10–15)
GLUCOSE SERPL-MCNC: 98 MG/DL (ref 70–99)
GLUCOSE UR STRIP-MCNC: NEGATIVE MG/DL
HCT VFR BLD AUTO: 47.1 % (ref 40–53)
HGB BLD-MCNC: 15.7 G/DL (ref 13.3–17.7)
HGB UR QL STRIP: NEGATIVE
IMM GRANULOCYTES # BLD: 0 10E3/UL
IMM GRANULOCYTES NFR BLD: 0 %
KETONES UR STRIP-MCNC: NEGATIVE MG/DL
LEUKOCYTE ESTERASE UR QL STRIP: NEGATIVE
LYMPHOCYTES # BLD AUTO: 1.9 10E3/UL (ref 0.8–5.3)
LYMPHOCYTES NFR BLD AUTO: 26 %
MCH RBC QN AUTO: 28 PG (ref 26.5–33)
MCHC RBC AUTO-ENTMCNC: 33.3 G/DL (ref 31.5–36.5)
MCV RBC AUTO: 84 FL (ref 78–100)
MONOCYTES # BLD AUTO: 0.5 10E3/UL (ref 0–1.3)
MONOCYTES NFR BLD AUTO: 6 %
MUCOUS THREADS #/AREA URNS LPF: PRESENT /LPF
NEUTROPHILS # BLD AUTO: 4.8 10E3/UL (ref 1.6–8.3)
NEUTROPHILS NFR BLD AUTO: 64 %
NITRATE UR QL: NEGATIVE
NRBC # BLD AUTO: 0 10E3/UL
NRBC BLD AUTO-RTO: 0 /100
PH UR STRIP: 7 [PH] (ref 5–7)
PLATELET # BLD AUTO: 175 10E3/UL (ref 150–450)
POTASSIUM SERPL-SCNC: 3.8 MMOL/L (ref 3.4–5.3)
RBC # BLD AUTO: 5.61 10E6/UL (ref 4.4–5.9)
RBC URINE: <1 /HPF
SODIUM SERPL-SCNC: 140 MMOL/L (ref 135–145)
SP GR UR STRIP: 1.01 (ref 1–1.03)
SQUAMOUS EPITHELIAL: 2 /HPF
UROBILINOGEN UR STRIP-MCNC: NORMAL MG/DL
WBC # BLD AUTO: 7.5 10E3/UL (ref 4–11)
WBC URINE: 0 /HPF

## 2024-04-09 PROCEDURE — 36415 COLL VENOUS BLD VENIPUNCTURE: CPT | Performed by: EMERGENCY MEDICINE

## 2024-04-09 PROCEDURE — 250N000013 HC RX MED GY IP 250 OP 250 PS 637: Performed by: EMERGENCY MEDICINE

## 2024-04-09 PROCEDURE — 74177 CT ABD & PELVIS W/CONTRAST: CPT

## 2024-04-09 PROCEDURE — 99285 EMERGENCY DEPT VISIT HI MDM: CPT | Mod: 25

## 2024-04-09 PROCEDURE — 80048 BASIC METABOLIC PNL TOTAL CA: CPT | Performed by: EMERGENCY MEDICINE

## 2024-04-09 PROCEDURE — 250N000011 HC RX IP 250 OP 636: Performed by: EMERGENCY MEDICINE

## 2024-04-09 PROCEDURE — T1013 SIGN LANG/ORAL INTERPRETER: HCPCS | Mod: U3

## 2024-04-09 PROCEDURE — 85004 AUTOMATED DIFF WBC COUNT: CPT | Performed by: EMERGENCY MEDICINE

## 2024-04-09 PROCEDURE — 81001 URINALYSIS AUTO W/SCOPE: CPT | Performed by: EMERGENCY MEDICINE

## 2024-04-09 PROCEDURE — 250N000009 HC RX 250: Performed by: EMERGENCY MEDICINE

## 2024-04-09 RX ORDER — IOPAMIDOL 755 MG/ML
100 INJECTION, SOLUTION INTRAVASCULAR ONCE
Status: COMPLETED | OUTPATIENT
Start: 2024-04-09 | End: 2024-04-09

## 2024-04-09 RX ADMIN — SIMETHICONE 226 ML: 125 CAPSULE, LIQUID FILLED ORAL at 13:47

## 2024-04-09 RX ADMIN — IOPAMIDOL 100 ML: 755 INJECTION, SOLUTION INTRAVENOUS at 12:38

## 2024-04-09 RX ADMIN — SODIUM CHLORIDE 65 ML: 9 INJECTION, SOLUTION INTRAVENOUS at 12:39

## 2024-04-09 ASSESSMENT — ACTIVITIES OF DAILY LIVING (ADL)
ADLS_ACUITY_SCORE: 35
ADLS_ACUITY_SCORE: 35
ADLS_ACUITY_SCORE: 33
ADLS_ACUITY_SCORE: 35
ADLS_ACUITY_SCORE: 35

## 2024-04-09 ASSESSMENT — COLUMBIA-SUICIDE SEVERITY RATING SCALE - C-SSRS
2. HAVE YOU ACTUALLY HAD ANY THOUGHTS OF KILLING YOURSELF IN THE PAST MONTH?: NO
6. HAVE YOU EVER DONE ANYTHING, STARTED TO DO ANYTHING, OR PREPARED TO DO ANYTHING TO END YOUR LIFE?: NO
1. IN THE PAST MONTH, HAVE YOU WISHED YOU WERE DEAD OR WISHED YOU COULD GO TO SLEEP AND NOT WAKE UP?: NO

## 2024-04-09 NOTE — ED PROVIDER NOTES
History     Chief Complaint:  Constipation       The history is provided by the patient. The history is limited by a language barrier. A  was used (ASL).      Santo Toure is a 44 year old deaf male presenting with constipation.  He has been struggling with this over the last 6 months but this seems to be severe over the last 5 days.  He has had only tiny amounts of stool output despite use of MiraLAX, Metamucil, Dulcolax, and stool softener.  The small amounts of stool he has had are non-black or bloody.  He has associated lower abdominal pain, left greater than right.  He has not had nausea, vomiting, or fever.    He has been seen in the emergency department for constipation in the past and follows with GI.  He does not take a daily bowel regimen, only when he is symptomatic.  He tells me he drinks 12 or more bottles of water a day.    Independent Historian:   As above    Review of External Notes:   Available records from [facility], indicating he was seen in November where he was prescribed Linzess and Bentyl and recommended to have a colonoscopy.  The colonoscopy was performed 11/27/2023 and revealed only a polyp in the transverse colon.    Medications:    albuterol (PROAIR HFA/PROVENTIL HFA/VENTOLIN HFA) 108 (90 Base) MCG/ACT inhaler  lisinopril (ZESTRIL) 10 MG tablet  venlafaxine (EFFEXOR XR) 75 MG 24 hr capsule    Past Medical History:    Past Medical History:   Diagnosis Date    Asthma     Deaf     Kidney stone 09/14/2017    PONV (postoperative nausea and vomiting)      Past Surgical History:    Past Surgical History:   Procedure Laterality Date    CYSTOSCOPY, RETROGRADES, INSERT STENT URETER(S), COMBINED Right 9/14/2017    Procedure: COMBINED CYSTOSCOPY, RETROGRADES, INSERT STENT URETER(S);  Cysto , right stent placement;  Surgeon: Pavel Lundy MD;  Location: [facility] OR    LASER HOLMIUM LITHOTRIPSY URETER(S), INSERT STENT, COMBINED Right 10/5/2017    Procedure:  COMBINED CYSTOSCOPY, URETEROSCOPY, LASER HOLMIUM LITHOTRIPSY URETER(S), INSERT STENT;  Cystoscopy, right ureteroscopy, laser lithotripsy, stent exchange;  Surgeon: Pavel Lundy MD;  Location: [facility] OR        Physical Exam   Patient Vitals for the past 24 hrs:   BP Temp Temp src Pulse Resp SpO2 Height Weight   04/09/24 1154 (!) 136/92 -- -- 64 -- 96 % -- --   04/09/24 0702 -- -- -- -- -- -- 1.829 m (6') 90.7 kg (200 lb)   04/09/24 0701 -- 97.4  F (36.3  C) Temporal -- -- -- -- --   04/09/24 0700 (!) 147/95 -- -- 70 20 100 % -- --        Physical Exam  General: Well-developed and well-nourished. Well appearing middle-aged  man. Cooperative.  Head:  Atraumatic.  Eyes:  Conjunctivae, lids, and sclerae are normal.  ENT:    Normal nose. Moist mucous membranes.  Neck:  Supple. Normal range of motion.  CV:  Regular rate and rhythm. Normal heart sounds with no murmurs, rubs, or gallops detected.  Resp:  No respiratory distress. Clear to auscultation bilaterally without decreased breath sounds, wheezing, rales, or rhonchi.  GI:  Soft. Non-distended.  Mild bilateral lower quadrant tenderness, left greater than right.   MS:  Normal ROM.   Skin:  Warm. Non-diaphoretic. No pallor.  Neuro:  Awake. A&Ox3. Normal strength.  Psych: Normal mood and affect. Normal speech.  Vitals reviewed.    Emergency Department Course   Imaging:  CT Abdomen Pelvis w Contrast   Final Result   IMPRESSION:    1.  Moderate burden of stool throughout the colon.   2.  Mild nonspecific circumferential bladder wall thickening which can   be correlated with urinalysis as clinically indicated.      JENNIE BROWN MD            SYSTEM ID:  [id number]             Laboratory:  Labs Ordered and Resulted from Time of ED Arrival to Time of ED Departure   BASIC METABOLIC PANEL - Abnormal       Result Value    Sodium 140      Potassium 3.8      Chloride 101      Carbon Dioxide (CO2) 22      Anion Gap 17 (*)     Urea Nitrogen 13.8      Creatinine  0.75      GFR Estimate >90      Calcium 8.9      Glucose 98     ROUTINE UA WITH MICROSCOPIC REFLEX TO CULTURE - Abnormal    Color Urine Straw      Appearance Urine Clear      Glucose Urine Negative      Bilirubin Urine Negative      Ketones Urine Negative      Specific Gravity Urine 1.010      Blood Urine Negative      pH Urine 7.0      Protein Albumin Urine Negative      Urobilinogen Urine Normal      Nitrite Urine Negative      Leukocyte Esterase Urine Negative      Mucus Urine Present (*)     RBC Urine <1      WBC Urine 0      Squamous Epithelials Urine 2 (*)    CBC WITH PLATELETS AND DIFFERENTIAL    WBC Count 7.5      RBC Count 5.61      Hemoglobin 15.7      Hematocrit 47.1      MCV 84      MCH 28.0      MCHC 33.3      RDW 13.1      Platelet Count 175      % Neutrophils 64      % Lymphocytes 26      % Monocytes 6      % Eosinophils 3      % Basophils 1      % Immature Granulocytes 0      NRBCs per 100 WBC 0      Absolute Neutrophils 4.8      Absolute Lymphocytes 1.9      Absolute Monocytes 0.5      Absolute Eosinophils 0.2      Absolute Basophils 0.1      Absolute Immature Granulocytes 0.0      Absolute NRBCs 0.0        Emergency Department Course & Assessments:    Interventions:  Medications   Enema Compound (docusate/mineral oil/NaPhos) NO MAG CIT PREMIX (226 mLs Rectal $Given 4/9/24 1347)      Assessments:  1434 I reevaluated patient.  He has had a large bowel movement and is feeling improved.  Reviewed discharge instructions.    Independent Interpretation (X-rays, CTs, rhythm strip):  I independently interpreted the CT and see no obstruction.    Consultations/Discussion of Management or Tests:  Not applicable    Social Determinants of Health affecting care:   Established primary care provider.  Employed.    Disposition:  The patient was discharged.     Impression & Plan    Medical Decision Making:  Santo is a 44 year old man struggling with constipation over the last 6 months (he has seen GI and been  evaluated in the ED before as well) which has worsened in the last 5 days and associated with lower abdominal pain.  On exam he is well-appearing, with mild bilateral lower quadrant tenderness, left greater than right.  Certainly there would be concern for diverticulitis or other acute pathology that is contributing to his worsening constipation so he underwent CT of the abdomen and pelvis.  Fortunately, no acute pathology is identified with a moderate burden of stool.  Laboratory studies including urinalysis are unremarkable.  There is no evidence of UTI/cystitis despite nonspecific finding on CT.    He was treated with a pink lady enema and had good results.  He felt improved.  He is appropriate for discharge.  He tells me he would like to switch GIs so I placed a new referral.  In the meantime I recommended he take twice daily MiraLAX which can be changed to once daily when he is having regular bowel movements.  I recommended he use a daily stool softener.  I recommended he drink lots of water, be very active, and increase dietary fiber.  We discussed use of twice daily senna and twice daily bisacodyl if he develops recurrent constipation that is more severe.  As above, he will follow-up with GI.  Indications for return reviewed.  All questions answered.  Amenable to discharge.    Diagnosis:    ICD-10-CM    1. Chronic idiopathic constipation  K59.04 Adult GI  Referral - Consult Only           Discharge Medications:  Discharge Medication List as of 4/9/2024  2:39 PM         4/9/2024   Molly Burnett MD Dixson, Kylie S, MD  04/15/24 3121

## 2024-04-09 NOTE — LETTER
April 9, 2024      To Whom It May Concern:      Santo Toure was seen in our Emergency Department today, 04/09/24.  I expect his condition to improve over the next 1-3 days.  He may return to work/school when improved.    Sincerely,        Gela Graf RN

## 2024-04-09 NOTE — DISCHARGE INSTRUCTIONS
Twice daily MiraLAX until you are having regular bowel movements.  Once you are having regular bowel movements you can decrease to once daily MiraLAX. You may need this lifelong.  I would also take once daily docusate which is a stool softener.  If you feel that you are starting to have constipation again, I would initiate once to twice daily senna as well as once to twice daily bisacodyl.  Continue to drink lots of water.  Continue to be very active.  Increase dietary fiber.  If you cannot get fiber in your diet you could also use a fiber supplement.  This is something that you could be doing daily to help prevent constipation rather than treatment for constipation.  Return with vomiting, severe pain, fever, or any other new or concerning symptoms.  Referral for gastroenterology placed.  You should follow-up with them to discuss ongoing treatment.

## 2024-04-12 ENCOUNTER — MYC REFILL (OUTPATIENT)
Dept: FAMILY MEDICINE | Facility: CLINIC | Age: 44
End: 2024-04-12
Payer: COMMERCIAL

## 2024-04-12 DIAGNOSIS — J06.9 VIRAL URI WITH COUGH: ICD-10-CM

## 2024-04-12 RX ORDER — ALBUTEROL SULFATE 90 UG/1
AEROSOL, METERED RESPIRATORY (INHALATION)
Qty: 18 G | Refills: 0 | Status: SHIPPED | OUTPATIENT
Start: 2024-04-12 | End: 2024-06-17

## 2024-04-14 ENCOUNTER — MYC MEDICAL ADVICE (OUTPATIENT)
Dept: PEDIATRICS | Facility: CLINIC | Age: 44
End: 2024-04-14
Payer: COMMERCIAL

## 2024-04-15 ENCOUNTER — OFFICE VISIT (OUTPATIENT)
Dept: PEDIATRICS | Facility: CLINIC | Age: 44
End: 2024-04-15
Payer: COMMERCIAL

## 2024-04-15 VITALS
TEMPERATURE: 99 F | BODY MASS INDEX: 28.54 KG/M2 | HEIGHT: 72 IN | SYSTOLIC BLOOD PRESSURE: 118 MMHG | WEIGHT: 210.7 LBS | HEART RATE: 90 BPM | RESPIRATION RATE: 14 BRPM | DIASTOLIC BLOOD PRESSURE: 68 MMHG | OXYGEN SATURATION: 98 %

## 2024-04-15 DIAGNOSIS — F41.1 GENERALIZED ANXIETY DISORDER: ICD-10-CM

## 2024-04-15 DIAGNOSIS — Z11.59 NEED FOR HEPATITIS C SCREENING TEST: ICD-10-CM

## 2024-04-15 DIAGNOSIS — K59.00 CONSTIPATION, UNSPECIFIED CONSTIPATION TYPE: Primary | ICD-10-CM

## 2024-04-15 DIAGNOSIS — Z11.4 SCREENING FOR HIV (HUMAN IMMUNODEFICIENCY VIRUS): ICD-10-CM

## 2024-04-15 PROCEDURE — 99214 OFFICE O/P EST MOD 30 MIN: CPT | Performed by: INTERNAL MEDICINE

## 2024-04-15 RX ORDER — MAGNESIUM CARB/ALUMINUM HYDROX 105-160MG
296 TABLET,CHEWABLE ORAL ONCE
Qty: 296 ML | Refills: 0 | Status: SHIPPED | OUTPATIENT
Start: 2024-04-15 | End: 2024-04-15

## 2024-04-15 RX ORDER — POLYETHYLENE GLYCOL 3350 17 G/17G
1 POWDER, FOR SOLUTION ORAL DAILY
COMMUNITY
Start: 2024-04-15

## 2024-04-15 RX ORDER — BISACODYL 5 MG/1
5 TABLET, DELAYED RELEASE ORAL DAILY PRN
Qty: 3 TABLET | Refills: 0 | Status: SHIPPED | OUTPATIENT
Start: 2024-04-15

## 2024-04-15 RX ORDER — VENLAFAXINE HYDROCHLORIDE 150 MG/1
150 CAPSULE, EXTENDED RELEASE ORAL DAILY
Qty: 90 CAPSULE | Refills: 1 | Status: SHIPPED | OUTPATIENT
Start: 2024-04-15

## 2024-04-15 ASSESSMENT — ANXIETY QUESTIONNAIRES
GAD7 TOTAL SCORE: 10
4. TROUBLE RELAXING: MORE THAN HALF THE DAYS
2. NOT BEING ABLE TO STOP OR CONTROL WORRYING: MORE THAN HALF THE DAYS
2. NOT BEING ABLE TO STOP OR CONTROL WORRYING: MORE THAN HALF THE DAYS
GAD7 TOTAL SCORE: 10
4. TROUBLE RELAXING: MORE THAN HALF THE DAYS
3. WORRYING TOO MUCH ABOUT DIFFERENT THINGS: MORE THAN HALF THE DAYS
1. FEELING NERVOUS, ANXIOUS, OR ON EDGE: SEVERAL DAYS
GAD7 TOTAL SCORE: 10
7. FEELING AFRAID AS IF SOMETHING AWFUL MIGHT HAPPEN: SEVERAL DAYS
7. FEELING AFRAID AS IF SOMETHING AWFUL MIGHT HAPPEN: SEVERAL DAYS
8. IF YOU CHECKED OFF ANY PROBLEMS, HOW DIFFICULT HAVE THESE MADE IT FOR YOU TO DO YOUR WORK, TAKE CARE OF THINGS AT HOME, OR GET ALONG WITH OTHER PEOPLE?: SOMEWHAT DIFFICULT
GAD7 TOTAL SCORE: 10
6. BECOMING EASILY ANNOYED OR IRRITABLE: SEVERAL DAYS
5. BEING SO RESTLESS THAT IT IS HARD TO SIT STILL: SEVERAL DAYS
7. FEELING AFRAID AS IF SOMETHING AWFUL MIGHT HAPPEN: SEVERAL DAYS
1. FEELING NERVOUS, ANXIOUS, OR ON EDGE: SEVERAL DAYS
IF YOU CHECKED OFF ANY PROBLEMS ON THIS QUESTIONNAIRE, HOW DIFFICULT HAVE THESE PROBLEMS MADE IT FOR YOU TO DO YOUR WORK, TAKE CARE OF THINGS AT HOME, OR GET ALONG WITH OTHER PEOPLE: SOMEWHAT DIFFICULT
3. WORRYING TOO MUCH ABOUT DIFFERENT THINGS: MORE THAN HALF THE DAYS
5. BEING SO RESTLESS THAT IT IS HARD TO SIT STILL: SEVERAL DAYS
6. BECOMING EASILY ANNOYED OR IRRITABLE: SEVERAL DAYS
IF YOU CHECKED OFF ANY PROBLEMS ON THIS QUESTIONNAIRE, HOW DIFFICULT HAVE THESE PROBLEMS MADE IT FOR YOU TO DO YOUR WORK, TAKE CARE OF THINGS AT HOME, OR GET ALONG WITH OTHER PEOPLE: SOMEWHAT DIFFICULT

## 2024-04-15 ASSESSMENT — ASTHMA QUESTIONNAIRES
QUESTION_2 LAST FOUR WEEKS HOW OFTEN HAVE YOU HAD SHORTNESS OF BREATH: ONCE OR TWICE A WEEK
QUESTION_5 LAST FOUR WEEKS HOW WOULD YOU RATE YOUR ASTHMA CONTROL: SOMEWHAT CONTROLLED
ACT_TOTALSCORE: 18
QUESTION_3 LAST FOUR WEEKS HOW OFTEN DID YOUR ASTHMA SYMPTOMS (WHEEZING, COUGHING, SHORTNESS OF BREATH, CHEST TIGHTNESS OR PAIN) WAKE YOU UP AT NIGHT OR EARLIER THAN USUAL IN THE MORNING: NOT AT ALL
QUESTION_1 LAST FOUR WEEKS HOW MUCH OF THE TIME DID YOUR ASTHMA KEEP YOU FROM GETTING AS MUCH DONE AT WORK, SCHOOL OR AT HOME: A LITTLE OF THE TIME
QUESTION_4 LAST FOUR WEEKS HOW OFTEN HAVE YOU USED YOUR RESCUE INHALER OR NEBULIZER MEDICATION (SUCH AS ALBUTEROL): ONE OR TWO TIMES PER DAY
ACT_TOTALSCORE: 18

## 2024-04-15 ASSESSMENT — PATIENT HEALTH QUESTIONNAIRE - PHQ9
SUM OF ALL RESPONSES TO PHQ QUESTIONS 1-9: 14
SUM OF ALL RESPONSES TO PHQ QUESTIONS 1-9: 14
10. IF YOU CHECKED OFF ANY PROBLEMS, HOW DIFFICULT HAVE THESE PROBLEMS MADE IT FOR YOU TO DO YOUR WORK, TAKE CARE OF THINGS AT HOME, OR GET ALONG WITH OTHER PEOPLE: SOMEWHAT DIFFICULT

## 2024-04-15 NOTE — PROGRESS NOTES
Assessment & Plan         Constipation, unspecified constipation type    CT reassuring.  Discussed diet and a more consistent bowel regimen:    Patient Instructions   Let' begin 1 dose of magnesium citrate today.    Begin dulcolax one dose today and for 3 days in a row.     Begin miralax 1 cap ful every day.  May increase or decrease the dose to make 2-3 bowel movements per day.    High fiber diet:  High fiber diet advised, including limiting white rice, white bread, white pasta, and dairy products, and increasing intake of whole grains, vegetables, and fruits.  An adult should strive for goal of 35 grams of fiber daily, and a child should strive for (his/her age + 5) grams of fiber daily.  An apple, for instance, has about 4 grams of fiber.        Pernell Birmingham MD  Internal Medicine and Pediatrics          - magnesium citrate 1.745 GM/30ML solution; Take 296 mLs by mouth once for 1 dose  - bisacodyl (DULCOLAX) 5 MG EC tablet; Take 1 tablet (5 mg) by mouth daily as needed for constipation      Generalized anxiety disorder  Increase to 150 per day.  GEOVANNY increased today.   - venlafaxine (EFFEXOR XR) 150 MG 24 hr capsule; Take 1 capsule (150 mg) by mouth daily            BMI  Estimated body mass index is 28.58 kg/m  as calculated from the following:    Height as of this encounter: 1.829 m (6').    Weight as of this encounter: 95.6 kg (210 lb 11.2 oz).   Weight management plan: Discussed healthy diet and exercise guidelines    Depression Screening Follow Up        4/15/2024    11:46 AM   PHQ   PHQ-9 Total Score 14   Q9: Thoughts of better off dead/self-harm past 2 weeks Not at all         4/15/2024    11:46 AM   Last PHQ-9   1.  Little interest or pleasure in doing things 1   2.  Feeling down, depressed, or hopeless 1   3.  Trouble falling or staying asleep, or sleeping too much 2   4.  Feeling tired or having little energy 2   5.  Poor appetite or overeating 2   6.  Feeling bad about yourself 2   7.  Trouble  "concentrating 2   8.  Moving slowly or restless 2   Q9: Thoughts of better off dead/self-harm past 2 weeks 0   PHQ-9 Total Score 14             See Patient Instructions    Subjective   Santo is a 44 year old, presenting for the following health issues:  Abdominal Pain and Recheck Medication      4/15/2024     1:43 PM   Additional Questions   Roomed by Monica Childress CMA     History of Present Illness       Mental Health Follow-up:  Patient presents to follow-up on Anxiety.    Patient's anxiety since last visit has been:  Medium  The patient is not having other symptoms associated with anxiety.  Any significant life events: job concerns and health concerns  Patient is feeling anxious or having panic attacks.  Patient has no concerns about alcohol or drug use.    Reason for visit:  Ongoing conspitation and sharp stomache pains. I have missed ALOT days of work. also I would like to talk about  increasing dosage on my anxiety meds    He eats 2-3 servings of fruits and vegetables daily.He consumes 2 sweetened beverage(s) daily.He exercises with enough effort to increase his heart rate 30 to 60 minutes per day.  He exercises with enough effort to increase his heart rate 4 days per week.   He is taking medications regularly.       Still with abd pain and constipation.    In emergency room 6 days ago; had CT done with moderate stool burden.  Had bad abd pain and felt bloated.  Felt like anus was \"sewn up.\"   Was given and enema and was suggested miralax (no results).   Bisacodyl.  Emergency room notes not done yet.  Had a painful bowel movement but then stopped miralax.      Has only been to work 2 days in last 2 weeks.  Needs note to be off until 4/15-4/17, back on 4/18.  Needs FMLA Nov until now for FMLA, ongoing with intermittent absences about 2-3 days per month.       Trial of linzess last fall:  unsuccessful.               Review of Systems  Constitutional, HEENT, cardiovascular, pulmonary, GI, , musculoskeletal, " neuro, skin, endocrine and psych systems are negative, except as otherwise noted.      Objective    /68 (BP Location: Right arm, Patient Position: Sitting, Cuff Size: Adult Large)   Pulse 90   Temp 99  F (37.2  C) (Temporal)   Resp 14   Ht 1.829 m (6')   Wt 95.6 kg (210 lb 11.2 oz)   SpO2 98%   BMI 28.58 kg/m    Body mass index is 28.58 kg/m .  Physical Exam   GENERAL: alert and no distress  EYES: Eyes grossly normal to inspection, PERRL and conjunctivae and sclerae normal  HENT: ear canals and TM's normal, nose and mouth without ulcers or lesions  NECK: no adenopathy, no asymmetry, masses, or scars  RESP: lungs clear to auscultation - no rales, rhonchi or wheezes  CV: regular rate and rhythm, normal S1 S2, no S3 or S4, no murmur, click or rub, no peripheral edema  ABDOMEN: soft, nontender, no hepatosplenomegaly, no masses and bowel sounds normal  MS: no gross musculoskeletal defects noted, no edema  SKIN: no suspicious lesions or rashes  NEURO: Normal strength and tone, mentation intact and speech normal  PSYCH: mentation appears normal, affect normal/bright            Signed Electronically by: Pernell Birmingham MD

## 2024-04-15 NOTE — LETTER
April 15, 2024      Santo Toure  [address]  [place] [address]        To Whom It May Concern,         Santo Toure was seen in our clinic.  He should be off 4/15-17. He may return to work without restrictions on 4/18/24      Sincerely,        Pernell Birmingham MD      Sincerely,        Pernell Birmingham MD

## 2024-04-15 NOTE — TELEPHONE ENCOUNTER
RN recommended E-visit (per clinic policy) in order to address patient request for medical advice.           Jo CRAVEN RN on 4/15/2024 at 10:40 AM

## 2024-04-15 NOTE — PATIENT INSTRUCTIONS
Let' begin 1 dose of magnesium citrate today.    Begin dulcolax one dose today and for 3 days in a row.     Begin miralax 1 cap ful every day.  May increase or decrease the dose to make 2-3 bowel movements per day.    High fiber diet:  High fiber diet advised, including limiting white rice, white bread, white pasta, and dairy products, and increasing intake of whole grains, vegetables, and fruits.  An adult should strive for goal of 35 grams of fiber daily, and a child should strive for (his/her age + 5) grams of fiber daily.  An apple, for instance, has about 4 grams of fiber.        Pernell Birmingham MD  Internal Medicine and Pediatrics

## 2024-04-16 ENCOUNTER — MYC MEDICAL ADVICE (OUTPATIENT)
Dept: PEDIATRICS | Facility: CLINIC | Age: 44
End: 2024-04-16
Payer: COMMERCIAL

## 2024-04-16 NOTE — TELEPHONE ENCOUNTER
Printed forms- placed in pcp basket for completion.  Patient had appt on 4/15/24.    When complete fax to [employer] [phone] and copy of 3rd page to patient.       Jaclyn HILL, - [facility]  Primary Care- ADS, [facility]

## 2024-04-16 NOTE — TELEPHONE ENCOUNTER
Faxed to [employer], abstracting and uploaded a copy to [employer] for patient.    Original in Yehls completed file    Jaclyn HILL, - [employer]  Primary Care- ADS, [facility]

## 2024-05-02 ENCOUNTER — MYC MEDICAL ADVICE (OUTPATIENT)
Dept: PEDIATRICS | Facility: CLINIC | Age: 44
End: 2024-05-02
Payer: COMMERCIAL

## 2024-05-02 DIAGNOSIS — R10.84 ABDOMINAL PAIN, GENERALIZED: Primary | ICD-10-CM

## 2024-05-14 ENCOUNTER — APPOINTMENT (OUTPATIENT)
Dept: CT IMAGING | Facility: CLINIC | Age: 44
End: 2024-05-14
Attending: EMERGENCY MEDICINE
Payer: COMMERCIAL

## 2024-05-14 ENCOUNTER — HOSPITAL ENCOUNTER (EMERGENCY)
Facility: CLINIC | Age: 44
Discharge: HOME | End: 2024-05-15
Attending: EMERGENCY MEDICINE | Admitting: EMERGENCY MEDICINE
Payer: COMMERCIAL

## 2024-05-14 DIAGNOSIS — R11.0 NAUSEA WITHOUT VOMITING: ICD-10-CM

## 2024-05-14 DIAGNOSIS — R10.9 ABDOMINAL PAIN, UNSPECIFIED ABDOMINAL LOCATION: ICD-10-CM

## 2024-05-14 LAB
ALBUMIN SERPL BCG-MCNC: 4.1 G/DL (ref 3.5–5.2)
ALBUMIN UR-MCNC: NEGATIVE MG/DL
ALP SERPL-CCNC: 62 U/L (ref 40–150)
ALT SERPL W P-5'-P-CCNC: 18 U/L (ref 0–70)
ANION GAP SERPL CALCULATED.3IONS-SCNC: 8 MMOL/L (ref 7–15)
APPEARANCE UR: CLEAR
AST SERPL W P-5'-P-CCNC: 21 U/L (ref 0–45)
BASOPHILS # BLD AUTO: 0.1 10E3/UL (ref 0–0.2)
BASOPHILS NFR BLD AUTO: 1 %
BILIRUB SERPL-MCNC: 0.2 MG/DL
BILIRUB UR QL STRIP: NEGATIVE
BUN SERPL-MCNC: 17.4 MG/DL (ref 6–20)
CALCIUM SERPL-MCNC: 8.9 MG/DL (ref 8.6–10)
CHLORIDE SERPL-SCNC: 100 MMOL/L (ref 98–107)
COLOR UR AUTO: NORMAL
CREAT SERPL-MCNC: 0.86 MG/DL (ref 0.67–1.17)
DEPRECATED HCO3 PLAS-SCNC: 29 MMOL/L (ref 22–29)
EGFRCR SERPLBLD CKD-EPI 2021: >90 ML/MIN/1.73M2
EOSINOPHIL # BLD AUTO: 0.2 10E3/UL (ref 0–0.7)
EOSINOPHIL NFR BLD AUTO: 2 %
ERYTHROCYTE [DISTWIDTH] IN BLOOD BY AUTOMATED COUNT: 12.8 % (ref 10–15)
GLUCOSE SERPL-MCNC: 103 MG/DL (ref 70–99)
GLUCOSE UR STRIP-MCNC: NEGATIVE MG/DL
HCT VFR BLD AUTO: 46.7 % (ref 40–53)
HGB BLD-MCNC: 15.6 G/DL (ref 13.3–17.7)
HGB UR QL STRIP: NEGATIVE
IMM GRANULOCYTES # BLD: 0 10E3/UL
IMM GRANULOCYTES NFR BLD: 0 %
KETONES UR STRIP-MCNC: NEGATIVE MG/DL
LEUKOCYTE ESTERASE UR QL STRIP: NEGATIVE
LYMPHOCYTES # BLD AUTO: 2.7 10E3/UL (ref 0.8–5.3)
LYMPHOCYTES NFR BLD AUTO: 27 %
MCH RBC QN AUTO: 28.2 PG (ref 26.5–33)
MCHC RBC AUTO-ENTMCNC: 33.4 G/DL (ref 31.5–36.5)
MCV RBC AUTO: 84 FL (ref 78–100)
MONOCYTES # BLD AUTO: 0.6 10E3/UL (ref 0–1.3)
MONOCYTES NFR BLD AUTO: 6 %
NEUTROPHILS # BLD AUTO: 6.3 10E3/UL (ref 1.6–8.3)
NEUTROPHILS NFR BLD AUTO: 64 %
NITRATE UR QL: NEGATIVE
NRBC # BLD AUTO: 0 10E3/UL
NRBC BLD AUTO-RTO: 0 /100
PH UR STRIP: 6 [PH] (ref 5–7)
PLATELET # BLD AUTO: 187 10E3/UL (ref 150–450)
POTASSIUM SERPL-SCNC: 4.1 MMOL/L (ref 3.4–5.3)
PROT SERPL-MCNC: 6.5 G/DL (ref 6.4–8.3)
RBC # BLD AUTO: 5.53 10E6/UL (ref 4.4–5.9)
RBC URINE: 0 /HPF
SODIUM SERPL-SCNC: 137 MMOL/L (ref 135–145)
SP GR UR STRIP: 1.01 (ref 1–1.03)
SQUAMOUS EPITHELIAL: <1 /HPF
UROBILINOGEN UR STRIP-MCNC: NORMAL MG/DL
WBC # BLD AUTO: 9.9 10E3/UL (ref 4–11)
WBC URINE: 0 /HPF

## 2024-05-14 PROCEDURE — 250N000011 HC RX IP 250 OP 636: Performed by: EMERGENCY MEDICINE

## 2024-05-14 PROCEDURE — 80053 COMPREHEN METABOLIC PANEL: CPT | Performed by: EMERGENCY MEDICINE

## 2024-05-14 PROCEDURE — 36415 COLL VENOUS BLD VENIPUNCTURE: CPT | Performed by: EMERGENCY MEDICINE

## 2024-05-14 PROCEDURE — 85025 COMPLETE CBC W/AUTO DIFF WBC: CPT | Performed by: EMERGENCY MEDICINE

## 2024-05-14 PROCEDURE — 74177 CT ABD & PELVIS W/CONTRAST: CPT

## 2024-05-14 PROCEDURE — 99285 EMERGENCY DEPT VISIT HI MDM: CPT | Mod: 25

## 2024-05-14 PROCEDURE — 81001 URINALYSIS AUTO W/SCOPE: CPT | Performed by: EMERGENCY MEDICINE

## 2024-05-14 RX ORDER — HYDROMORPHONE HYDROCHLORIDE 1 MG/ML
0.5 INJECTION, SOLUTION INTRAMUSCULAR; INTRAVENOUS; SUBCUTANEOUS
Status: DISCONTINUED | OUTPATIENT
Start: 2024-05-14 | End: 2024-05-15 | Stop reason: HOSPADM

## 2024-05-14 RX ORDER — ONDANSETRON 2 MG/ML
4 INJECTION INTRAMUSCULAR; INTRAVENOUS
Status: DISCONTINUED | OUTPATIENT
Start: 2024-05-14 | End: 2024-05-15 | Stop reason: HOSPADM

## 2024-05-14 RX ORDER — DICYCLOMINE HCL 20 MG
20 TABLET ORAL ONCE
Status: COMPLETED | OUTPATIENT
Start: 2024-05-14 | End: 2024-05-15

## 2024-05-14 RX ORDER — IOPAMIDOL 755 MG/ML
500 INJECTION, SOLUTION INTRAVASCULAR ONCE
Status: COMPLETED | OUTPATIENT
Start: 2024-05-14 | End: 2024-05-14

## 2024-05-14 RX ADMIN — IOPAMIDOL 100 ML: 755 INJECTION, SOLUTION INTRAVENOUS at 23:20

## 2024-05-14 ASSESSMENT — COLUMBIA-SUICIDE SEVERITY RATING SCALE - C-SSRS
1. IN THE PAST MONTH, HAVE YOU WISHED YOU WERE DEAD OR WISHED YOU COULD GO TO SLEEP AND NOT WAKE UP?: NO
6. HAVE YOU EVER DONE ANYTHING, STARTED TO DO ANYTHING, OR PREPARED TO DO ANYTHING TO END YOUR LIFE?: NO
2. HAVE YOU ACTUALLY HAD ANY THOUGHTS OF KILLING YOURSELF IN THE PAST MONTH?: NO

## 2024-05-14 ASSESSMENT — ACTIVITIES OF DAILY LIVING (ADL)
ADLS_ACUITY_SCORE: 35
ADLS_ACUITY_SCORE: 35

## 2024-05-15 LAB
HOLD SPECIMEN: NORMAL
HOLD SPECIMEN: NORMAL

## 2024-05-15 PROCEDURE — 250N000013 HC RX MED GY IP 250 OP 250 PS 637: Performed by: EMERGENCY MEDICINE

## 2024-05-15 RX ORDER — DICYCLOMINE HCL 20 MG
20 TABLET ORAL 4 TIMES DAILY PRN
Qty: 20 TABLET | Refills: 0 | Status: SHIPPED | OUTPATIENT
Start: 2024-05-15

## 2024-05-15 RX ORDER — ONDANSETRON 4 MG/1
4 TABLET, ORALLY DISINTEGRATING ORAL EVERY 6 HOURS PRN
Qty: 10 TABLET | Refills: 0 | Status: SHIPPED | OUTPATIENT
Start: 2024-05-15

## 2024-05-15 RX ORDER — DICYCLOMINE HCL 20 MG
20 TABLET ORAL 4 TIMES DAILY PRN
Qty: 20 TABLET | Refills: 0 | Status: SHIPPED | OUTPATIENT
Start: 2024-05-15 | End: 2024-05-15

## 2024-05-15 RX ORDER — ONDANSETRON 4 MG/1
4 TABLET, ORALLY DISINTEGRATING ORAL EVERY 6 HOURS PRN
Qty: 10 TABLET | Refills: 0 | Status: SHIPPED | OUTPATIENT
Start: 2024-05-15 | End: 2024-05-15

## 2024-05-15 RX ADMIN — DICYCLOMINE HYDROCHLORIDE 20 MG: 20 TABLET ORAL at 00:11

## 2024-05-15 NOTE — ED PROVIDER NOTES
Emergency Department Note      History of Present Illness     Chief Complaint  Abdominal pain    Professional  was used for encounter.    HPI  Santo Toure is a 44 year old male who presents to the emergency department for evaluation of 2 days of lower abdominal pain.  Patient reports pain is in the right low abdomen radiating to his flank.  Reports history of kidney stones and this does not feel similar.  Denies fever, vomiting, constipation, bloody stools, urinary symptoms.  Reports nausea.  He tried taking ibuprofen without relief.  Movement seems to make the pain worse.    Independent Historian  None    Review of External Notes  None  Past Medical History   Medical History and Problem List  Past Medical History:   Diagnosis Date    Asthma     Deaf     Kidney stone 09/14/2017    PONV (postoperative nausea and vomiting)        Medications  dicyclomine (BENTYL) 20 MG tablet  ondansetron (ZOFRAN ODT) 4 MG ODT tab  albuterol (PROAIR HFA/PROVENTIL HFA/VENTOLIN HFA) 108 (90 Base) MCG/ACT inhaler  bisacodyl (DULCOLAX) 5 MG EC tablet  polyethylene glycol (MIRALAX) 17 GM/Dose powder  venlafaxine (EFFEXOR XR) 150 MG 24 hr capsule  venlafaxine (EFFEXOR XR) 75 MG 24 hr capsule        Surgical History   Past Surgical History:   Procedure Laterality Date    CYSTOSCOPY, RETROGRADES, INSERT STENT URETER(S), COMBINED Right 9/14/2017    Procedure: COMBINED CYSTOSCOPY, RETROGRADES, INSERT STENT URETER(S);  Cysto , right stent placement;  Surgeon: Pavel Lundy MD;  Location: [facility] OR    LASER HOLMIUM LITHOTRIPSY URETER(S), INSERT STENT, COMBINED Right 10/5/2017    Procedure: COMBINED CYSTOSCOPY, URETEROSCOPY, LASER HOLMIUM LITHOTRIPSY URETER(S), INSERT STENT;  Cystoscopy, right ureteroscopy, laser lithotripsy, stent exchange;  Surgeon: Pavel Lundy MD;  Location: [facility] OR     Physical Exam   No data found.  Physical Exam  General: Alert, no acute distress  HEENT:  Moist mucous membranes.   Conjunctiva normal.   CV:  RRR, no m/r/g, skin warm and well perfused  Pulm:  CTAB, no wheezes/ronchi/rales.  No acute distress, breathing comfortably  GI:  Soft, bilateral lower abdominal tenderness (worse in RLQ), nondistended.  No rebound or guarding.   MSK:  Moving all extremities.  No focal areas of edema, erythema  Skin:  WWP, no rashes, skin color normal, no diaphoresis  Psych:  Well-appearing, normal affect, regular speech    Diagnostics   Lab Results   Labs Ordered and Resulted from Time of ED Arrival to Time of ED Departure   COMPREHENSIVE METABOLIC PANEL - Abnormal       Result Value    Sodium 137      Potassium 4.1      Carbon Dioxide (CO2) 29      Anion Gap 8      Urea Nitrogen 17.4      Creatinine 0.86      GFR Estimate >90      Calcium 8.9      Chloride 100      Glucose 103 (*)     Alkaline Phosphatase 62      AST 21      ALT 18      Protein Total 6.5      Albumin 4.1      Bilirubin Total 0.2     ROUTINE UA WITH MICROSCOPIC REFLEX TO CULTURE - Normal    Color Urine Straw      Appearance Urine Clear      Glucose Urine Negative      Bilirubin Urine Negative      Ketones Urine Negative      Specific Gravity Urine 1.007      Blood Urine Negative      pH Urine 6.0      Protein Albumin Urine Negative      Urobilinogen Urine Normal      Nitrite Urine Negative      Leukocyte Esterase Urine Negative      RBC Urine 0      WBC Urine 0      Squamous Epithelials Urine <1     CBC WITH PLATELETS AND DIFFERENTIAL    WBC Count 9.9      RBC Count 5.53      Hemoglobin 15.6      Hematocrit 46.7      MCV 84      MCH 28.2      MCHC 33.4      RDW 12.8      Platelet Count 187      % Neutrophils 64      % Lymphocytes 27      % Monocytes 6      % Eosinophils 2      % Basophils 1      % Immature Granulocytes 0      NRBCs per 100 WBC 0      Absolute Neutrophils 6.3      Absolute Lymphocytes 2.7      Absolute Monocytes 0.6      Absolute Eosinophils 0.2      Absolute Basophils 0.1      Absolute Immature Granulocytes 0.0       Absolute NRBCs 0.0         Imaging  Abd/pelvis CT,  IV  contrast only TRAUMA / AAA   Final Result   IMPRESSION:    1.  No acute abnormality. No appendicitis or other bowel inflammation or obstruction.          Independent Interpretation  None  ED Course    Medications Administered  Medications   iopamidol (ISOVUE-370) solution 500 mL (100 mLs Intravenous $Given 5/14/24 2320)   sodium chloride (PF) 0.9% PF flush 100 mL (65 mLs Intravenous $Given 5/14/24 2320)   dicyclomine (BENTYL) tablet 20 mg (20 mg Oral $Given 5/15/24 0011)       Procedures  Procedures     Discussion of Management  None    Social Determinants of Health adding to complexity of care  None    ED Course     Medical Decision Making / Diagnosis   CMS Diagnoses: None    MIPS     None    MDM  Santo Toure is a 44 year old male presenting to the emergency department valuation of 2 days of lower abdominal pain.  Please see above for details in the HPI and exam.  Patient exhibits bilateral lower abdominal tenderness, worse in the right lower quadrant.  Did consider broad differential including appendicitis, diverticulitis, cystitis amongst other things.  Basic lab studies above are unremarkable.  UA is normal.  CT abdomen shows no acute abnormality.  Pain seems to be well-controlled here in the emergency department.  Unclear cause for patient's symptomatology today, but I do not find any life-threatening or sinister etiologies at this time.  Patient understands and is comfortable the uncertainty of the diagnosis and that certain pathologies can take time to declare itself.  Given reassuring work-up thus far, however, with reasonable clinical certainty I feel that the patient is safe to discharge home.  He understands he is welcome to return to the ER for any new or worsening symptoms discussed at bedside.       Disposition  The patient was discharged.     ICD-10 Codes:    ICD-10-CM    1. Abdominal pain, unspecified abdominal location  R10.9       2.  Nausea without vomiting  R11.0            Discharge Medications  Discharge Medication List as of 5/15/2024 12:40 AM            MD Ankita Solares, Augustin Isidro MD  05/15/24 0332

## 2024-05-15 NOTE — ED TRIAGE NOTES
Pt here for abdominal and back pain around the belt line that started about 3 days ago. Pt states bending over and twisting makes the pain worse. Denies N/V/D. Ibuprofen taken this morning without any relief.

## 2024-05-15 NOTE — DISCHARGE INSTRUCTIONS
Discharge Instructions  Abdominal Pain    Abdominal pain (belly pain) can be caused by many things. Your evaluation today does not show the exact cause for your pain. Your provider today has decided that it is unlikely your pain is due to a life threatening problem, or a problem requiring surgery or hospital admission. Sometimes those problems cannot be found right away, so it is very important that you follow up as directed.  Sometimes only the changes which occur over time allow the cause of your pain to be found.    Generally, every Emergency Department visit should have a follow-up clinic visit with either a primary or a specialty clinic/provider. Please follow-up as instructed by your emergency provider today. With abdominal pain, we often recommend very close follow-up, such as the following day.    ADULTS:  Return to the Emergency Department right away if:    You get an oral temperature above 102oF or as directed by your provider.  You have blood in your stools. This may be bright red or appear as black, tarry stools.    You keep vomiting (throwing up) or cannot drink liquids.  You see blood when you vomit.   You cannot have a bowel movement or you cannot pass gas.  Your stomach gets bloated or bigger.  Your skin or the whites of your eyes look yellow.  You faint.  You have bloody, frequent or painful urination (peeing).  You have new symptoms or anything that worries you.    CHILDREN:  Return to the Emergency Department right away if your child has any of the above-listed symptoms or the following:    Pushes your hand away or screams/cries when his/her belly is touched.  You notice your child is very fussy or weak.  Your child is very tired and is too tired to eat or drink.  Your child is dehydrated.  Signs of dehydration can be:  Significant change in the amount of wet diapers/urine.  Your infant or child starts to have dry mouth and lips, or no saliva (spit) or tears.    PREGNANT WOMEN:  Return to the  Emergency Department right away if you have any of the above-listed symptoms or the following:    You have bleeding, leaking fluid or passing tissue from the vagina.  You have worse pain or cramping, or pain in your shoulder or back.  You have vomiting that will not stop.  You have a temperature of 100oF or more.  Your baby is not moving as much as usual.  You faint.  You get a bad headache with or without eye problems and abdominal pain.  You have a seizure.  You have unusual discharge from your vagina and abdominal pain.    Abdominal pain is pretty common during pregnancy.  Your pain may or may not be related to your pregnancy. You should follow-up closely with your OB provider so they can evaluate you and your baby.  Until you follow-up with your regular provider, do the following:     Avoid sex and do not put anything in your vagina.  Drink clear fluids.  Only take medications approved by your provider.    MORE INFORMATION:    Appendicitis:  A possible cause of abdominal pain in any person who still has their appendix is acute appendicitis. Appendicitis is often hard to diagnose.  Testing does not always rule out early appendicitis or other causes of abdominal pain. Close follow-up with your provider and re-evaluations may be needed to figure out the reason for your abdominal pain.    Follow-up:  It is very important that you make an appointment with your clinic and go to the appointment.  If you do not follow-up with your primary provider, it may result in missing an important development which could result in permanent injury or disability and/or lasting pain.  If there is any problem keeping your appointment, call your provider or return to the Emergency Department.    Medications:  Take your medications as directed by your provider today.  Before using over-the-counter medications, ask your provider and make sure to take the medications as directed.  If you have any questions about medications, ask your  "provider.    Diet:  Resume your normal diet as much as possible, but do not eat fried, fatty or spicy foods while you have pain.  Do not drink alcohol or have caffeine.  Do not smoke tobacco.    Probiotics: If you have been given an antibiotic, you may want to also take a probiotic pill or eat yogurt with live cultures. Probiotics have \"good bacteria\" to help your intestines stay healthy. Studies have shown that probiotics help prevent diarrhea (loose stools) and other intestine problems (including C. diff infection) when you take antibiotics. You can buy these without a prescription in the pharmacy section of the store.     If you were given a prescription for medicine here today, be sure to read all of the information (including the package insert) that comes with your prescription.  This will include important information about the medicine, its side effects, and any warnings that you need to know about.  The pharmacist who fills the prescription can provide more information and answer questions you may have about the medicine.  If you have questions or concerns that the pharmacist cannot address, please call or return to the Emergency Department.       Remember that you can always come back to the Emergency Department if you are not able to see your regular provider in the amount of time listed above, if you get any new symptoms, or if there is anything that worries you.          "

## 2024-06-01 ENCOUNTER — HEALTH MAINTENANCE LETTER (OUTPATIENT)
Age: 44
End: 2024-06-01

## 2024-06-10 ENCOUNTER — MYC MEDICAL ADVICE (OUTPATIENT)
Dept: PEDIATRICS | Facility: CLINIC | Age: 44
End: 2024-06-10
Payer: COMMERCIAL

## 2024-06-10 NOTE — TELEPHONE ENCOUNTER
Sure, this is usually a discussion. Patient needs visit--schedule with extender or resident.    Betty Sutherland PA-C

## 2024-06-13 NOTE — TELEPHONE ENCOUNTER
Patient is scheduled with Dr. Birmingham on 6/24/24.  Closing encounter.    Jaclyn HILL, - Flex  Primary Care- ADS, [facility]

## 2024-06-17 ENCOUNTER — MYC REFILL (OUTPATIENT)
Dept: FAMILY MEDICINE | Facility: CLINIC | Age: 44
End: 2024-06-17
Payer: COMMERCIAL

## 2024-06-17 DIAGNOSIS — J06.9 VIRAL URI WITH COUGH: ICD-10-CM

## 2024-06-17 RX ORDER — ALBUTEROL SULFATE 90 UG/1
AEROSOL, METERED RESPIRATORY (INHALATION)
Qty: 18 G | Refills: 0 | Status: SHIPPED | OUTPATIENT
Start: 2024-06-17

## 2024-06-24 ENCOUNTER — OFFICE VISIT (OUTPATIENT)
Dept: PEDIATRICS | Facility: CLINIC | Age: 44
End: 2024-06-24
Payer: COMMERCIAL

## 2024-06-24 VITALS
OXYGEN SATURATION: 99 % | WEIGHT: 211.9 LBS | HEART RATE: 84 BPM | RESPIRATION RATE: 16 BRPM | DIASTOLIC BLOOD PRESSURE: 90 MMHG | TEMPERATURE: 98 F | HEIGHT: 72 IN | SYSTOLIC BLOOD PRESSURE: 133 MMHG | BODY MASS INDEX: 28.7 KG/M2

## 2024-06-24 DIAGNOSIS — K59.00 CONSTIPATION, UNSPECIFIED CONSTIPATION TYPE: ICD-10-CM

## 2024-06-24 DIAGNOSIS — Z11.59 NEED FOR HEPATITIS C SCREENING TEST: ICD-10-CM

## 2024-06-24 DIAGNOSIS — R10.84 ABDOMINAL PAIN, GENERALIZED: Primary | ICD-10-CM

## 2024-06-24 DIAGNOSIS — Z11.4 SCREENING FOR HIV (HUMAN IMMUNODEFICIENCY VIRUS): ICD-10-CM

## 2024-06-24 PROCEDURE — 99213 OFFICE O/P EST LOW 20 MIN: CPT | Performed by: INTERNAL MEDICINE

## 2024-06-24 PROCEDURE — 36415 COLL VENOUS BLD VENIPUNCTURE: CPT | Performed by: INTERNAL MEDICINE

## 2024-06-24 PROCEDURE — 84403 ASSAY OF TOTAL TESTOSTERONE: CPT | Performed by: INTERNAL MEDICINE

## 2024-06-24 PROCEDURE — 86364 TISS TRNSGLTMNASE EA IG CLAS: CPT | Performed by: INTERNAL MEDICINE

## 2024-06-24 PROCEDURE — T1013 SIGN LANG/ORAL INTERPRETER: HCPCS | Mod: U3

## 2024-06-24 ASSESSMENT — ASTHMA QUESTIONNAIRES
ACT_TOTALSCORE: 18
QUESTION_2 LAST FOUR WEEKS HOW OFTEN HAVE YOU HAD SHORTNESS OF BREATH: ONCE OR TWICE A WEEK
QUESTION_4 LAST FOUR WEEKS HOW OFTEN HAVE YOU USED YOUR RESCUE INHALER OR NEBULIZER MEDICATION (SUCH AS ALBUTEROL): ONE OR TWO TIMES PER DAY
ACT_TOTALSCORE: 18
QUESTION_1 LAST FOUR WEEKS HOW MUCH OF THE TIME DID YOUR ASTHMA KEEP YOU FROM GETTING AS MUCH DONE AT WORK, SCHOOL OR AT HOME: A LITTLE OF THE TIME
QUESTION_3 LAST FOUR WEEKS HOW OFTEN DID YOUR ASTHMA SYMPTOMS (WHEEZING, COUGHING, SHORTNESS OF BREATH, CHEST TIGHTNESS OR PAIN) WAKE YOU UP AT NIGHT OR EARLIER THAN USUAL IN THE MORNING: ONCE OR TWICE
QUESTION_5 LAST FOUR WEEKS HOW WOULD YOU RATE YOUR ASTHMA CONTROL: WELL CONTROLLED

## 2024-06-24 ASSESSMENT — PAIN SCALES - GENERAL: PAINLEVEL: EXTREME PAIN (8)

## 2024-06-24 NOTE — PROGRESS NOTES
Assessment & Plan       Constipation, unspecified constipation type  He does not seem to have severe constipation based on his CAT scans or his symptoms.  He does not respond excellently to MiraLAX and Dulcolax, but he does not have any significantly hard stools either.  His colonoscopy was essentially normal except for some polyps.  I do not believe that he has inflammatory bowel disease, but he could have something in the upper digestive tract contributing to his symptoms, however these are predominantly right lower quadrant symptoms.  - Tissue transglutaminase chris IgA and IgG; Future  - Adult GI  Referral - Consult Only; Future  - Tissue transglutaminase chris IgA and IgG  - Testosterone, total; Future    Abdominal pain, generalized  Overall, I feel that he needs a gastroenterology specialist to help focus on his diet and possibly a few more diagnostic tests.  He has already tried Linzess and Zofran and dicyclomine, and I am going to put him on some probiotics empirically to see if this helps with the recent gas that he has had.    I did his [facility] paperwork for April 15 to June 24, stating that he was not able to work any of those days, and in the future I have put him on an intermittent leave for 3 days per flareup with 3 flareups per month.  Hopefully he will not need this much time off, and his symptoms will be able to be alleviated with his gastroenterology follow-up and possible further medication trials.  - Tissue transglutaminase chris IgA and IgG; Future  - Adult GI  Referral - Consult Only; Future  - Tissue transglutaminase chris IgA and IgG                Judy Blanchard is a 44 year old, presenting for the following health issues:  RECHECK        6/24/2024     3:21 PM   Additional Questions   Roomed by magnus   Accompanied by ns     History of Present Illness       Reason for visit:  STD paper work, testosterone blood work, talk about referral to [facility]. still not feeling well  "on/off since november, missing alot days of work and this can not continue.    He eats 2-3 servings of fruits and vegetables daily.He consumes 1 sweetened beverage(s) daily.He exercises with enough effort to increase his heart rate 20 to 29 minutes per day.  He exercises with enough effort to increase his heart rate 5 days per week.   He is taking medications regularly.     Ongoing constipation symptoms.  Some sharp abdominal pains.    Right testicle hurts, before and after he has a bowel movement and while eating.       Uncomfortable to sit, stand, and lie down.      Uses pepto bismol 2-3 times per day, which helps.      Bowel movements are once weekly.  Sometimes 3 times per day.  No blood. Eating makes it worse. Pain is focused on right side into right testicle.  Has not had follow up with Dr. Loera after the colonoscopy.      Has tried dicyclomine and linzess and zofran.     Takes dulcolax and miralax but not always effective.   After bowel movements still can get right lower quadrant soreness.   No history of abdominal surgery in life. No known worsening with dairy or gluten.  Yogurt can make it worse.     Has missed 58 days of work since April    Has history of kidney stones 2015.     Sometimes feels \"watery\" in abdomen, and during bowel movement will have lots of gas.               Review of Systems  Constitutional, HEENT, cardiovascular, pulmonary, GI, , musculoskeletal, neuro, skin, endocrine and psych systems are negative, except as otherwise noted.      Objective    BP (!) 133/90   Pulse 84   Temp 98  F (36.7  C) (Temporal)   Resp 16   Ht 1.829 m (6')   Wt 96.1 kg (211 lb 14.4 oz)   SpO2 99%   BMI 28.74 kg/m    Body mass index is 28.74 kg/m .  Physical Exam   GENERAL: alert and no distress  EYES: Eyes grossly normal to inspection, PERRL and conjunctivae and sclerae normal  HENT: ear canals and TM's normal, nose and mouth without ulcers or lesions  NECK: no adenopathy, no asymmetry, masses, or " scars  RESP: lungs clear to auscultation - no rales, rhonchi or wheezes  CV: regular rate and rhythm, normal S1 S2, no S3 or S4, no murmur, click or rub, no peripheral edema  ABDOMEN: soft, nontender, no hepatosplenomegaly, no masses and bowel sounds normal  MS: no gross musculoskeletal defects noted, no edema  SKIN: no suspicious lesions or rashes  NEURO: Normal strength and tone, mentation intact and speech normal  PSYCH: mentation appears normal, affect normal/bright            Signed Electronically by: Pernell Birmingham MD

## 2024-06-24 NOTE — PATIENT INSTRUCTIONS
"I filled out paperwork for being off 4/15 thru 6/24.    Then intermittently 3 days at a time, 3 times per month for next 6 months.     Begin with trial of a probiotic like \"Allign\" or \"Culturelle\".  You can also try yogurt with the active culture \"Lactobacillus Acidophilus\", which can help with digestion, bloating and gas.      Lots of fiber:     High fiber diet advised, including limiting white rice, white bread, white pasta, and dairy products, and increasing intake of whole grains, vegetables, and fruits.  An adult should strive for goal of 35 grams of fiber daily, and a child should strive for (his/her age + 5) grams of fiber daily.  An apple, for instance, has about 4 grams of fiber.      Avoid lots of cheese and dairy.    Lab work today:  We can do labs in the exam room today, or you can get them done downstairs in the lab.      Referred to GRACE Pineda for further evaluation  "

## 2024-06-26 LAB
TTG IGA SER-ACNC: 0.3 U/ML
TTG IGG SER-ACNC: <0.6 U/ML

## 2024-06-28 ENCOUNTER — MYC MEDICAL ADVICE (OUTPATIENT)
Dept: PEDIATRICS | Facility: CLINIC | Age: 44
End: 2024-06-28
Payer: COMMERCIAL

## 2024-06-28 DIAGNOSIS — R53.83 OTHER FATIGUE: Primary | ICD-10-CM

## 2024-06-28 LAB — TESTOST SERPL-MCNC: 225 NG/DL (ref 240–950)

## 2024-07-01 NOTE — TELEPHONE ENCOUNTER
"See patient's MyChart message   - Patient wondering about his estrogen results     Sent a [employer] message to the patient   - Informed the patient that a testosterone, total and Tissue transglutaminase chris IgA and IgG labs were collected on 6/24/2024   - Informed the patient of Dr. Birmingham's result note:     \"Lm,     Your testosterone looked slightly low, but I 'm not sure that it is related at all to your current symptoms.     I'd like to consider (after your stomach issues are resolved) rechecking your testosterone level, but this time with an early AM blood draw, which is more accurate for testosterone.     Pernell Birmingham MD  Internal Medicine and Pediatrics\"     Component      Latest Ref Rng 6/24/2024  4:03 PM   Tissue Transglutaminase Antibody IgA      <7.0 U/mL 0.3    Tissue Transglutaminase Chris IgG      <7.0 U/mL <0.6    Testosterone Total      240 - 950 ng/dL 225 (L)      Lynette CONDE RN   [facility]   "

## 2024-07-01 NOTE — TELEPHONE ENCOUNTER
"See patient's [employer] message   - Patient wondering if his testosterone results have resulted     Sent a [employer] message to the patient   - Informed the patient of Dr. Birmingham's 6/24/2024 result note:      \"Lm,     Your testosterone looked slightly low, but I 'm not sure that it is related at all to your current symptoms.     I'd like to consider (after your stomach issues are resolved) rechecking your testosterone level, but this time with an early AM blood draw, which is more accurate for testosterone.     Pernell Birmingham MD  Internal Medicine and Pediatrics\"      Component      Latest Ref Rng 6/24/2024  4:03 PM   Tissue Transglutaminase Antibody IgA      <7.0 U/mL 0.3    Tissue Transglutaminase Lena IgG      <7.0 U/mL <0.6    Testosterone Total      240 - 950 ng/dL 225 (L)       Lynette CONDE RN   [facility]   "

## 2024-07-02 NOTE — TELEPHONE ENCOUNTER
See patient's MyChart message   - Patient requesting to have his estrogen levels drawn     Dr. Birmingham, please review, advise and order as appropriate.     Lynette CONDE RN   [facility]

## 2024-07-02 NOTE — TELEPHONE ENCOUNTER
Dr Birmingham,     Were estrogen level lab discussed at recent visit on 6/24? If they were not, I will request patient submit an e-visit for lab request.     Jo CRAVEN RN on 7/2/2024 at 8:52 AM

## 2024-07-03 ENCOUNTER — TRANSFERRED RECORDS (OUTPATIENT)
Dept: HEALTH INFORMATION MANAGEMENT | Facility: CLINIC | Age: 44
End: 2024-07-03
Payer: COMMERCIAL

## 2024-07-05 ENCOUNTER — LAB (OUTPATIENT)
Dept: LAB | Facility: CLINIC | Age: 44
End: 2024-07-05
Payer: COMMERCIAL

## 2024-07-05 DIAGNOSIS — R53.83 OTHER FATIGUE: ICD-10-CM

## 2024-07-05 PROCEDURE — 36415 COLL VENOUS BLD VENIPUNCTURE: CPT

## 2024-07-05 PROCEDURE — 82670 ASSAY OF TOTAL ESTRADIOL: CPT

## 2024-07-06 LAB — ESTRADIOL SERPL-MCNC: 19 PG/ML

## 2024-07-10 ENCOUNTER — TRANSFERRED RECORDS (OUTPATIENT)
Dept: HEALTH INFORMATION MANAGEMENT | Facility: CLINIC | Age: 44
End: 2024-07-10
Payer: COMMERCIAL

## 2024-07-10 LAB — TSH SERPL-ACNC: 1.8 UIU/ML (ref 0.45–4.5)

## 2024-08-02 ENCOUNTER — TRANSFERRED RECORDS (OUTPATIENT)
Dept: HEALTH INFORMATION MANAGEMENT | Facility: CLINIC | Age: 44
End: 2024-08-02
Payer: COMMERCIAL

## 2024-10-09 ENCOUNTER — MYC REFILL (OUTPATIENT)
Dept: PEDIATRICS | Facility: CLINIC | Age: 44
End: 2024-10-09
Payer: COMMERCIAL

## 2024-10-09 DIAGNOSIS — F41.1 GENERALIZED ANXIETY DISORDER: ICD-10-CM

## 2024-10-09 RX ORDER — VENLAFAXINE HYDROCHLORIDE 150 MG/1
150 CAPSULE, EXTENDED RELEASE ORAL DAILY
Qty: 90 CAPSULE | Refills: 3 | Status: SHIPPED | OUTPATIENT
Start: 2024-10-09

## 2025-06-09 DIAGNOSIS — F41.1 GENERALIZED ANXIETY DISORDER: ICD-10-CM

## 2025-06-10 ENCOUNTER — MYC REFILL (OUTPATIENT)
Dept: PEDIATRICS | Facility: CLINIC | Age: 45
End: 2025-06-10
Payer: COMMERCIAL

## 2025-06-10 ENCOUNTER — MYC MEDICAL ADVICE (OUTPATIENT)
Dept: PEDIATRICS | Facility: CLINIC | Age: 45
End: 2025-06-10
Payer: COMMERCIAL

## 2025-06-10 DIAGNOSIS — F41.1 GENERALIZED ANXIETY DISORDER: ICD-10-CM

## 2025-06-10 RX ORDER — VENLAFAXINE HYDROCHLORIDE 150 MG/1
150 CAPSULE, EXTENDED RELEASE ORAL DAILY
Qty: 90 CAPSULE | Refills: 3 | OUTPATIENT
Start: 2025-06-10

## 2025-06-10 NOTE — TELEPHONE ENCOUNTER
Called the [facility] in [place] at [phone] and spoke with Misa    - Misa states that the patient picked up a 90 day supply of his venlafaxine on 4/3/2025 and he is eligible for his next fill on 6/24/2025     Sent a [employer] message to the patient   - Informed the patient that he should have enough medication supply at this time as he picked up a 90 day supply of his venlafaxine on 4/3/2025 and he is eligible for his next fill on 6/24/2025     venlafaxine (EFFEXOR XR) 150 MG 24 hr capsule 90 capsule 3 10/9/2024 -- No   Sig - Route: Take 1 capsule (150 mg) by mouth daily. - Oral     Lynette CONDE RN   [facility]

## 2025-06-11 RX ORDER — VENLAFAXINE HYDROCHLORIDE 150 MG/1
150 CAPSULE, EXTENDED RELEASE ORAL DAILY
Qty: 90 CAPSULE | Refills: 3 | Status: SHIPPED | OUTPATIENT
Start: 2025-06-11

## 2025-06-11 NOTE — TELEPHONE ENCOUNTER
Dr. Birmingham- Please read Snapchat message   - pt reporting he is out of his last 90 supply refill for Venlafaxine 150 mg as he had to take more than one a day due to anxiety   - pt reporting he is court ordered to take Venlafaxine and needing early refill   - next refill due 6/24    Please advise if OK for early refill   Pended below.   Jade Judd RN

## 2025-06-14 ENCOUNTER — HEALTH MAINTENANCE LETTER (OUTPATIENT)
Age: 45
End: 2025-06-14

## (undated) DEVICE — SOL NACL 0.9% IRRIG 1000ML BOTTLE 07138-09

## (undated) DEVICE — GLOVE PROTEXIS W/NEU-THERA 7.5  2D73TE75

## (undated) DEVICE — CATH URETERAL OPEN END 05FR 70CM 020015

## (undated) DEVICE — DRAPE C-ARM 60X42" 1013

## (undated) DEVICE — PAD FLOOR SURGISAFE

## (undated) DEVICE — SOL NACL 0.9% IRRIG 3000ML BAG 07972-08

## (undated) DEVICE — PUMP SYSTEM SINGLE ACTION M0067201000

## (undated) DEVICE — GUIDEWIRE SENSOR DUAL FLEX STR 0.035"X150CM M0066703080

## (undated) DEVICE — GUIDEWIRE AMPLATZ 0.035"X145CM  SUPER STIFF 640-104

## (undated) DEVICE — BASKET NITINOL TIPLESS HALO  1.5FRX120CM 554120

## (undated) DEVICE — LUBRICATING JELLY 4.25OZ

## (undated) DEVICE — ENDO SEAL BX PORT BPS-A

## (undated) DEVICE — SYR 20ML LL W/O NDL

## (undated) DEVICE — RAD RX CONRAY 60% (50ML) CHARGE PER ML

## (undated) DEVICE — Device

## (undated) DEVICE — 272 MICRON BARE HOLMIUM FIBER

## (undated) DEVICE — SOL WATER IRRIG 1000ML BOTTLE 07139-09

## (undated) RX ORDER — PROPOFOL 10 MG/ML
INJECTION, EMULSION INTRAVENOUS
Status: DISPENSED
Start: 2017-10-05

## (undated) RX ORDER — DEXAMETHASONE SODIUM PHOSPHATE 4 MG/ML
INJECTION, SOLUTION INTRA-ARTICULAR; INTRALESIONAL; INTRAMUSCULAR; INTRAVENOUS; SOFT TISSUE
Status: DISPENSED
Start: 2017-09-14

## (undated) RX ORDER — CLINDAMYCIN PHOSPHATE 900 MG/50ML
INJECTION, SOLUTION INTRAVENOUS
Status: DISPENSED
Start: 2017-10-05

## (undated) RX ORDER — KETOROLAC TROMETHAMINE 30 MG/ML
INJECTION, SOLUTION INTRAMUSCULAR; INTRAVENOUS
Status: DISPENSED
Start: 2017-10-05

## (undated) RX ORDER — ONDANSETRON 2 MG/ML
INJECTION INTRAMUSCULAR; INTRAVENOUS
Status: DISPENSED
Start: 2017-10-05

## (undated) RX ORDER — CEFAZOLIN SODIUM 1 G/3ML
INJECTION, POWDER, FOR SOLUTION INTRAMUSCULAR; INTRAVENOUS
Status: DISPENSED
Start: 2017-09-14

## (undated) RX ORDER — LIDOCAINE HYDROCHLORIDE 10 MG/ML
INJECTION, SOLUTION EPIDURAL; INFILTRATION; INTRACAUDAL; PERINEURAL
Status: DISPENSED
Start: 2017-10-04

## (undated) RX ORDER — ONDANSETRON 2 MG/ML
INJECTION INTRAMUSCULAR; INTRAVENOUS
Status: DISPENSED
Start: 2017-09-14

## (undated) RX ORDER — CLINDAMYCIN PHOSPHATE 900 MG/50ML
INJECTION, SOLUTION INTRAVENOUS
Status: DISPENSED
Start: 2017-09-14

## (undated) RX ORDER — FENTANYL CITRATE 50 UG/ML
INJECTION, SOLUTION INTRAMUSCULAR; INTRAVENOUS
Status: DISPENSED
Start: 2017-10-05

## (undated) RX ORDER — PROPOFOL 10 MG/ML
INJECTION, EMULSION INTRAVENOUS
Status: DISPENSED
Start: 2017-09-14

## (undated) RX ORDER — GLYCOPYRROLATE 0.2 MG/ML
INJECTION, SOLUTION INTRAMUSCULAR; INTRAVENOUS
Status: DISPENSED
Start: 2017-09-14

## (undated) RX ORDER — DEXAMETHASONE SODIUM PHOSPHATE 4 MG/ML
INJECTION, SOLUTION INTRA-ARTICULAR; INTRALESIONAL; INTRAMUSCULAR; INTRAVENOUS; SOFT TISSUE
Status: DISPENSED
Start: 2017-10-05

## (undated) RX ORDER — GINSENG 100 MG
CAPSULE ORAL
Status: DISPENSED
Start: 2017-10-05

## (undated) RX ORDER — LIDOCAINE HYDROCHLORIDE 10 MG/ML
INJECTION, SOLUTION EPIDURAL; INFILTRATION; INTRACAUDAL; PERINEURAL
Status: DISPENSED
Start: 2017-10-05

## (undated) RX ORDER — FENTANYL CITRATE 50 UG/ML
INJECTION, SOLUTION INTRAMUSCULAR; INTRAVENOUS
Status: DISPENSED
Start: 2017-09-14